# Patient Record
Sex: FEMALE | Race: WHITE | NOT HISPANIC OR LATINO | Employment: OTHER | URBAN - METROPOLITAN AREA
[De-identification: names, ages, dates, MRNs, and addresses within clinical notes are randomized per-mention and may not be internally consistent; named-entity substitution may affect disease eponyms.]

---

## 2017-11-09 ENCOUNTER — OFFICE VISIT (OUTPATIENT)
Dept: INTERNAL MEDICINE | Facility: CLINIC | Age: 62
End: 2017-11-09

## 2017-11-09 VITALS
DIASTOLIC BLOOD PRESSURE: 70 MMHG | HEIGHT: 62 IN | OXYGEN SATURATION: 99 % | TEMPERATURE: 97.9 F | SYSTOLIC BLOOD PRESSURE: 112 MMHG | HEART RATE: 99 BPM | WEIGHT: 246.6 LBS | BODY MASS INDEX: 45.38 KG/M2

## 2017-11-09 DIAGNOSIS — Z23 NEED FOR TDAP VACCINATION: ICD-10-CM

## 2017-11-09 DIAGNOSIS — Z13.29 SCREENING FOR THYROID DISORDER: ICD-10-CM

## 2017-11-09 DIAGNOSIS — R63.4 ABNORMAL WEIGHT LOSS: ICD-10-CM

## 2017-11-09 DIAGNOSIS — F32.89 ATYPICAL DEPRESSION: ICD-10-CM

## 2017-11-09 DIAGNOSIS — Z12.11 ENCOUNTER FOR SCREENING COLONOSCOPY: ICD-10-CM

## 2017-11-09 DIAGNOSIS — R63.0 DECREASED APPETITE: ICD-10-CM

## 2017-11-09 DIAGNOSIS — Z00.00 WELL WOMAN EXAM (NO GYNECOLOGICAL EXAM): Primary | ICD-10-CM

## 2017-11-09 DIAGNOSIS — Z12.39 SCREENING FOR BREAST CANCER: ICD-10-CM

## 2017-11-09 DIAGNOSIS — Z13.1 SCREENING FOR DIABETES MELLITUS (DM): ICD-10-CM

## 2017-11-09 DIAGNOSIS — Z13.220 SCREENING FOR LIPID DISORDERS: ICD-10-CM

## 2017-11-09 PROBLEM — K21.9 ACID REFLUX: Status: ACTIVE | Noted: 2017-11-09

## 2017-11-09 PROBLEM — F30.9 BIPOLAR I DISORDER, SINGLE MANIC EPISODE: Status: ACTIVE | Noted: 2017-11-09

## 2017-11-09 PROBLEM — M54.50 LBP (LOW BACK PAIN): Status: ACTIVE | Noted: 2017-11-09

## 2017-11-09 PROBLEM — E78.5 HLD (HYPERLIPIDEMIA): Status: ACTIVE | Noted: 2017-11-09

## 2017-11-09 PROCEDURE — 99386 PREV VISIT NEW AGE 40-64: CPT | Performed by: FAMILY MEDICINE

## 2017-11-09 PROCEDURE — 99204 OFFICE O/P NEW MOD 45 MIN: CPT | Performed by: FAMILY MEDICINE

## 2017-11-09 PROCEDURE — 90471 IMMUNIZATION ADMIN: CPT | Performed by: FAMILY MEDICINE

## 2017-11-09 PROCEDURE — 90715 TDAP VACCINE 7 YRS/> IM: CPT | Performed by: FAMILY MEDICINE

## 2017-11-09 RX ORDER — FLUTICASONE PROPIONATE 50 MCG
2 SPRAY, SUSPENSION (ML) NASAL DAILY
Status: ON HOLD | COMMUNITY
End: 2017-12-09

## 2017-11-09 RX ORDER — MULTIVIT-MIN/IRON/FOLIC ACID/K 18-600-40
2000 CAPSULE ORAL DAILY
COMMUNITY
Start: 2013-11-11

## 2017-11-09 RX ORDER — TOPIRAMATE 100 MG/1
100 TABLET, FILM COATED ORAL 2 TIMES DAILY
COMMUNITY
Start: 2013-10-14 | End: 2018-02-13 | Stop reason: HOSPADM

## 2017-11-09 RX ORDER — NAPROXEN 500 MG/1
500 TABLET ORAL EVERY 12 HOURS PRN
COMMUNITY
Start: 2013-11-11 | End: 2017-12-20

## 2017-11-09 RX ORDER — PROPRANOLOL HYDROCHLORIDE 10 MG/1
10 TABLET ORAL DAILY PRN
COMMUNITY
End: 2017-12-12 | Stop reason: HOSPADM

## 2017-11-09 RX ORDER — ZOLPIDEM TARTRATE 10 MG/1
10 TABLET ORAL NIGHTLY PRN
Status: ON HOLD | COMMUNITY
Start: 2013-11-11 | End: 2017-12-09

## 2017-11-09 RX ORDER — ARMODAFINIL 250 MG/1
125 TABLET ORAL DAILY
COMMUNITY
End: 2018-01-09

## 2017-11-09 RX ORDER — IBUPROFEN 200 MG
200 TABLET ORAL AS NEEDED
Status: ON HOLD | COMMUNITY
End: 2017-12-09

## 2017-11-09 RX ORDER — AZELASTINE 1 MG/ML
2 SPRAY, METERED NASAL 2 TIMES DAILY
Status: ON HOLD | COMMUNITY
End: 2017-12-09

## 2017-11-09 NOTE — PROGRESS NOTES
Subjective   Bowen Concepcion is a 62 y.o. female.     Chief Complaint   Patient presents with   • New Patient Visit     Previous PCP: Dr. Gemma Vásquez   • Loss of Appetite     since September, patient has loss 26 lbs   • Trouble Sleeping     Patient states she has sleep apnea         History of Present Illness     Patient presents today with abnormal weight loss.  Patient notes that she initially was trying to lose weight.  She tried multiple different nutrition programs including [employer] and programs offered by the [facility].  She notes that even after she stopped the programs, she continued to lose weight.  She notes that she does not have an appetite.  She notes that her depression has been significant.  She notes her depression is been going on for many years, and has seen psychiatry.  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  She has a history of bipolar, and is unstable at times.  Patient however believes that her abnormal weight loss and decreased appetite is due to something organically being wrong with patient from the inside.  She does not believe that these could be related to her depression.  Patient states she lost 26 pounds in one month.    The following portions of the patient's history were reviewed and updated as appropriate: allergies, current medications, past family history, past medical history, past social history, past surgical history and problem list.    Review of Systems   Constitutional: Positive for fatigue and unexpected weight change. Negative for chills and fever.   HENT: Negative for congestion, rhinorrhea, sinus pain and sore throat.    Eyes: Negative for photophobia and visual disturbance.   Respiratory: Negative for cough, chest tightness and shortness of breath.    Cardiovascular: Negative for chest pain and palpitations.   Gastrointestinal: Negative for diarrhea, nausea and vomiting.   Genitourinary: Negative for  dysuria, frequency and urgency.   Skin: Negative for rash and wound.   Neurological: Negative for dizziness and syncope.   Psychiatric/Behavioral: Positive for decreased concentration and sleep disturbance. Negative for behavioral problems and confusion.       Objective   Physical Exam   Constitutional: She is oriented to person, place, and time. She appears well-developed and well-nourished.   HENT:   Head: Normocephalic and atraumatic.   Right Ear: External ear normal.   Left Ear: External ear normal.   Mouth/Throat: Oropharynx is clear and moist.   Eyes: EOM are normal.   Neck: Normal range of motion. Neck supple.   Cardiovascular: Normal rate, regular rhythm and normal heart sounds.    Pulmonary/Chest: Effort normal and breath sounds normal. No respiratory distress.   Musculoskeletal: Normal range of motion.   Lymphadenopathy:     She has no cervical adenopathy.   Neurological: She is alert and oriented to person, place, and time.   Skin: Skin is warm.   Psychiatric: She has a normal mood and affect. Her behavior is normal.   Nursing note and vitals reviewed.      Assessment/Plan   Bowen was seen today for new patient visit, loss of appetite and trouble sleeping.    Diagnoses and all orders for this visit:      Decreased appetite  -     Ambulatory Referral to Nutrition Services  -     I discussed with patient that her decreased appetite could most likely be due to her atypical depression.  I strongly suggested the patient to have a nutrition referral.  Patient understands and agrees with plan.    Abnormal weight loss  -     CBC & Differential  -     Comprehensive Metabolic Panel  -     Thyroid Panel With TSH  -     Ambulatory Referral to Nutrition Services    Atypical depression        -    Continue topiramate.  I discussed patient that her abnormal weight loss all related to her depression.  I strongly urged patient to contact her psychiatrist for further evaluation if labs come back  normal.              Return in about 4 weeks (around 12/7/2017).    Much of this encounter note is an electronic transcription/translation of spoken language to printed text. The electronic translation of spoken language may permit erroneous, or at times, nonsensical words or phrases to be inadvertently transcribed; although I have reviewed the note for such errors, some may still exist.

## 2017-11-09 NOTE — PROGRESS NOTES
Subjective   Bowen DOTSON Carlene is a 62 y.o. female and is here for a comprehensive physical exam. The patient reports problems - abnormal weight loss. Depression. .    Pt is UTD with annual gyn exam and mammo     Do you take any herbs or supplements that were not prescribed by a doctor? Yes  Calcium and vitamin D, iron, multivitamin, Prilosec.        Soci.al History:   Social History     Social History   • Marital status:      Spouse name: N/A   • Number of children: N/A   • Years of education: N/A     Occupational History   • Not on file.     Social History Main Topics   • Smoking status: Former Smoker     Packs/day: 2.00     Years: 26.00   • Smokeless tobacco: Never Used   • Alcohol use No   • Drug use: No   • Sexual activity: No     Other Topics Concern   • Not on file     Social History Narrative   • No narrative on file       Family History:   Family History   Problem Relation Age of Onset   • Heart disease Mother    • Hypertension Mother    • Depression Mother    • Cancer Father    • Depression Sister    • Depression Brother    • Depression Brother    • Depression Brother    • Depression Sister    • Vision loss Maternal Grandmother    • Vision loss Paternal Grandmother        Past Medical History:   Past Medical History:   Diagnosis Date   • Bipolar disorder    • Depression    • Sleep apnea            Review of Systems    Review of Systems   Constitutional: Positive for fatigue and unexpected weight change. Negative for chills and fever.   HENT: Negative for congestion, rhinorrhea, sinus pain and sore throat.    Eyes: Negative for photophobia and visual disturbance.   Respiratory: Negative for cough, chest tightness and shortness of breath.    Cardiovascular: Negative for chest pain and palpitations.   Gastrointestinal: Negative for diarrhea, nausea and vomiting.   Genitourinary: Negative for dysuria, frequency and urgency.   Skin: Negative for rash and wound.   Neurological: Negative for dizziness and  syncope.   Psychiatric/Behavioral: Positive for decreased concentration and sleep disturbance. Negative for behavioral problems and confusion.       Objective   Physical Exam   Constitutional: She is oriented to person, place, and time. She appears well-developed and well-nourished.   HENT:   Head: Normocephalic and atraumatic.   Right Ear: External ear normal.   Left Ear: External ear normal.   Mouth/Throat: Oropharynx is clear and moist.   Eyes: EOM are normal.   Neck: Normal range of motion. Neck supple.   Cardiovascular: Normal rate, regular rhythm and normal heart sounds.    Pulmonary/Chest: Effort normal and breath sounds normal. No respiratory distress.   Musculoskeletal: Normal range of motion.   Lymphadenopathy:     She has no cervical adenopathy.   Neurological: She is alert and oriented to person, place, and time.   Skin: Skin is warm.   Psychiatric: She has a normal mood and affect. Her behavior is normal.   Nursing note and vitals reviewed.      Medications:   Current Outpatient Prescriptions:   •  Armodafinil 250 MG tablet, Take 250 mg by mouth Daily., Disp: , Rfl:   •  aspirin (ROBINSON ASPIRIN EXTRA STRENGTH) 500 MG tablet, Take 500 mg by mouth As Needed for Mild Pain ., Disp: , Rfl:   •  azelastine (ASTELIN) 0.1 % nasal spray, 2 sprays into each nostril 2 (Two) Times a Day. Use in each nostril as directed, Disp: , Rfl:   •  Cholecalciferol (VITAMIN D) 2000 units capsule, Take 2,000 Units by mouth Daily., Disp: , Rfl:   •  fluticasone (FLONASE) 50 MCG/ACT nasal spray, 2 sprays into each nostril Daily., Disp: , Rfl:   •  ibuprofen (ADVIL,MOTRIN) 200 MG tablet, Take 200 mg by mouth As Needed for Mild Pain ., Disp: , Rfl:   •  Multiple Vitamins-Minerals (PRESERVISION AREDS 2 PO), Take 1 tablet by mouth Daily., Disp: , Rfl:   •  naproxen (NAPROSYN) 500 MG tablet, Take 500 mg by mouth Every 12 (Twelve) Hours As Needed., Disp: , Rfl:   •  propranolol (INDERAL) 10 MG tablet, Take 10 mg by mouth Daily As  Needed., Disp: , Rfl:   •  topiramate (TOPAMAX) 100 MG tablet, Take 100 mg by mouth 2 (Two) Times a Day., Disp: , Rfl:   •  zolpidem (AMBIEN) 10 MG tablet, Take 10 mg by mouth At Night As Needed., Disp: , Rfl:        Assessment/Plan   Healthy female exam.     1. Healthcare Maintenance:  2. Patient Counseling:  --Nutrition: Stressed importance of moderation in sodium/caffeine intake, saturated fat and cholesterol, caloric balance, sufficient intake of fresh fruits, vegetables, fiber, calcium and vit D. she does complain of abnormal weight loss.  She states that she has no appetite.  She is trying multiple different weight loss management programs.  I discussed the patient that she should seek counseling with nutrition.    --Exercise: patient states that she will try to restart her aqua-aerobics.  --Immunizations reviewed. We'll get a TdaP today.    --Discussed benefits of screening colonoscopy.    Diagnoses and all orders for this visit:    Well woman exam (no gynecological exam)  -     CBC & Differential  -     Comprehensive Metabolic Panel  -     Ambulatory Referral to Gynecology    Encounter for screening colonoscopy  -     Ambulatory Referral For Screening Colonoscopy    Screening for breast cancer  -     Mammo Screening Bilateral With CAD; Future    Screening for diabetes mellitus (DM)  -     Hemoglobin A1c    Screening for lipid disorders  -     Lipid Panel With LDL / HDL Ratio    Need for Tdap vaccination  -     Tdap Vaccine Greater Than or Equal To 6yo IM    Screening for thyroid disorder  -     Thyroid Panel With TSH    Other orders  -     topiramate (TOPAMAX) 100 MG tablet; Take 100 mg by mouth 2 (Two) Times a Day.  -     naproxen (NAPROSYN) 500 MG tablet; Take 500 mg by mouth Every 12 (Twelve) Hours As Needed.  -     zolpidem (AMBIEN) 10 MG tablet; Take 10 mg by mouth At Night As Needed.  -     Cholecalciferol (VITAMIN D) 2000 units capsule; Take 2,000 Units by mouth Daily.  -     Armodafinil 250 MG  tablet; Take 250 mg by mouth Daily.  -     propranolol (INDERAL) 10 MG tablet; Take 10 mg by mouth Daily As Needed.  -     azelastine (ASTELIN) 0.1 % nasal spray; 2 sprays into each nostril 2 (Two) Times a Day. Use in each nostril as directed  -     ibuprofen (ADVIL,MOTRIN) 200 MG tablet; Take 200 mg by mouth As Needed for Mild Pain .  -     aspirin (ROBINSON ASPIRIN EXTRA STRENGTH) 500 MG tablet; Take 500 mg by mouth As Needed for Mild Pain .  -     fluticasone (FLONASE) 50 MCG/ACT nasal spray; 2 sprays into each nostril Daily.  -     Multiple Vitamins-Minerals (PRESERVISION AREDS 2 PO); Take 1 tablet by mouth Daily.        Return in about 4 weeks (around 12/7/2017).

## 2017-11-10 ENCOUNTER — TELEPHONE (OUTPATIENT)
Dept: INTERNAL MEDICINE | Facility: CLINIC | Age: 62
End: 2017-11-10

## 2017-11-10 DIAGNOSIS — E87.0 SERUM SODIUM ELEVATED: Primary | ICD-10-CM

## 2017-11-10 LAB
ALBUMIN SERPL-MCNC: 4.3 G/DL (ref 3.5–5.2)
ALBUMIN/GLOB SERPL: 1.7 G/DL
ALP SERPL-CCNC: 94 U/L (ref 39–117)
ALT SERPL-CCNC: 19 U/L (ref 1–33)
AST SERPL-CCNC: 21 U/L (ref 1–32)
BASOPHILS # BLD AUTO: 0.04 10*3/MM3 (ref 0–0.2)
BASOPHILS NFR BLD AUTO: 0.4 % (ref 0–1.5)
BILIRUB SERPL-MCNC: 0.3 MG/DL (ref 0.1–1.2)
BUN SERPL-MCNC: 12 MG/DL (ref 8–23)
BUN/CREAT SERPL: 17.1 (ref 7–25)
CALCIUM SERPL-MCNC: 9.8 MG/DL (ref 8.6–10.5)
CHLORIDE SERPL-SCNC: 106 MMOL/L (ref 98–107)
CHOLEST SERPL-MCNC: 158 MG/DL (ref 0–200)
CO2 SERPL-SCNC: 24.4 MMOL/L (ref 22–29)
CREAT SERPL-MCNC: 0.7 MG/DL (ref 0.57–1)
EOSINOPHIL # BLD AUTO: 0.11 10*3/MM3 (ref 0–0.7)
EOSINOPHIL NFR BLD AUTO: 1.2 % (ref 0.3–6.2)
ERYTHROCYTE [DISTWIDTH] IN BLOOD BY AUTOMATED COUNT: 16.9 % (ref 11.7–13)
FT4I SERPL CALC-MCNC: 2.5 (ref 1.2–4.9)
GFR SERPLBLD CREATININE-BSD FMLA CKD-EPI: 102 ML/MIN/1.73
GFR SERPLBLD CREATININE-BSD FMLA CKD-EPI: 85 ML/MIN/1.73
GLOBULIN SER CALC-MCNC: 2.5 GM/DL
GLUCOSE SERPL-MCNC: 114 MG/DL (ref 65–99)
HBA1C MFR BLD: 5.7 % (ref 4.8–5.6)
HCT VFR BLD AUTO: 43.9 % (ref 35.6–45.5)
HDLC SERPL-MCNC: 54 MG/DL (ref 40–60)
HGB BLD-MCNC: 14 G/DL (ref 11.9–15.5)
IMM GRANULOCYTES # BLD: 0 10*3/MM3 (ref 0–0.03)
IMM GRANULOCYTES NFR BLD: 0 % (ref 0–0.5)
LDLC SERPL CALC-MCNC: 91 MG/DL (ref 0–100)
LDLC/HDLC SERPL: 1.68 {RATIO}
LYMPHOCYTES # BLD AUTO: 2.11 10*3/MM3 (ref 0.9–4.8)
LYMPHOCYTES NFR BLD AUTO: 22.1 % (ref 19.6–45.3)
MCH RBC QN AUTO: 26.3 PG (ref 26.9–32)
MCHC RBC AUTO-ENTMCNC: 31.9 G/DL (ref 32.4–36.3)
MCV RBC AUTO: 82.5 FL (ref 80.5–98.2)
MONOCYTES # BLD AUTO: 0.76 10*3/MM3 (ref 0.2–1.2)
MONOCYTES NFR BLD AUTO: 7.9 % (ref 5–12)
NEUTROPHILS # BLD AUTO: 6.54 10*3/MM3 (ref 1.9–8.1)
NEUTROPHILS NFR BLD AUTO: 68.4 % (ref 42.7–76)
PLATELET # BLD AUTO: 253 10*3/MM3 (ref 140–500)
POTASSIUM SERPL-SCNC: 4 MMOL/L (ref 3.5–5.2)
PROT SERPL-MCNC: 6.8 G/DL (ref 6–8.5)
RBC # BLD AUTO: 5.32 10*6/MM3 (ref 3.9–5.2)
SODIUM SERPL-SCNC: 147 MMOL/L (ref 136–145)
T3RU NFR SERPL: 31 % (ref 24–39)
T4 SERPL-MCNC: 8 UG/DL (ref 4.5–12)
TRIGL SERPL-MCNC: 66 MG/DL (ref 0–150)
TSH SERPL DL<=0.005 MIU/L-ACNC: 0.52 UIU/ML (ref 0.45–4.5)
VLDLC SERPL CALC-MCNC: 13.2 MG/DL (ref 5–40)
WBC # BLD AUTO: 9.56 10*3/MM3 (ref 4.5–10.7)

## 2017-11-10 NOTE — PROGRESS NOTES
Please inform the patient of the following abnormal results.  Thyroid is normal.  Lipid panel is normal.  CBC is within normal limits.  CMP does show some hypernatremia.  We will continue to monitor this.  Patient is to obtain a BMP in 1 week.  hbA1c is normal.

## 2017-11-10 NOTE — TELEPHONE ENCOUNTER
LVM- patient notified. Patient advised to contact office if they have any questions. Patient advised to contact office to schedule lab appointment for one week (around 11/16.) Lab orders put into EPIC.

## 2017-11-10 NOTE — TELEPHONE ENCOUNTER
----- Message from Aubrey Pitts MD sent at 11/10/2017  8:26 AM EST -----  Please inform the patient of the following abnormal results.  Thyroid is normal.  Lipid panel is normal.  CBC is within normal limits.  CMP does show some hypernatremia.  We will continue to monitor this.  Patient is to obtain a BMP in 1 week.  hbA1c is normal.

## 2017-11-16 ENCOUNTER — RESULTS ENCOUNTER (OUTPATIENT)
Dept: INTERNAL MEDICINE | Facility: CLINIC | Age: 62
End: 2017-11-16

## 2017-11-16 DIAGNOSIS — E87.0 SERUM SODIUM ELEVATED: ICD-10-CM

## 2017-11-16 LAB
BUN SERPL-MCNC: 19 MG/DL (ref 8–23)
BUN/CREAT SERPL: 25.7 (ref 7–25)
CALCIUM SERPL-MCNC: 9.6 MG/DL (ref 8.6–10.5)
CHLORIDE SERPL-SCNC: 107 MMOL/L (ref 98–107)
CO2 SERPL-SCNC: 24.6 MMOL/L (ref 22–29)
CREAT SERPL-MCNC: 0.74 MG/DL (ref 0.57–1)
GFR SERPLBLD CREATININE-BSD FMLA CKD-EPI: 79 ML/MIN/1.73
GFR SERPLBLD CREATININE-BSD FMLA CKD-EPI: 96 ML/MIN/1.73
GLUCOSE SERPL-MCNC: 102 MG/DL (ref 65–99)
POTASSIUM SERPL-SCNC: 4.4 MMOL/L (ref 3.5–5.2)
SODIUM SERPL-SCNC: 146 MMOL/L (ref 136–145)

## 2017-11-17 ENCOUNTER — TELEPHONE (OUTPATIENT)
Dept: INTERNAL MEDICINE | Facility: CLINIC | Age: 62
End: 2017-11-17

## 2017-11-17 DIAGNOSIS — E87.0 SERUM SODIUM ELEVATED: Primary | ICD-10-CM

## 2017-11-17 NOTE — TELEPHONE ENCOUNTER
----- Message from Aubrey Pitts MD sent at 11/17/2017  8:26 AM EST -----  Please inform the patient of the following abnormal results. Borderline elevated. Will continue to monitor. Will repeat BMP in one month.

## 2017-11-17 NOTE — TELEPHONE ENCOUNTER
Patient notified and voiced understanding. Patient advised to contact office if they have any questions. Lab appointment for one month scheduled and lab order put into EPIC per Dr. Pitts.

## 2017-11-17 NOTE — PROGRESS NOTES
Please inform the patient of the following abnormal results. Borderline elevated. Will continue to monitor. Will repeat BMP in one month.

## 2017-11-22 ENCOUNTER — HOSPITAL ENCOUNTER (OUTPATIENT)
Dept: MAMMOGRAPHY | Facility: HOSPITAL | Age: 62
Discharge: HOME | End: 2017-11-22
Admitting: FAMILY MEDICINE

## 2017-11-22 DIAGNOSIS — Z12.39 SCREENING FOR BREAST CANCER: ICD-10-CM

## 2017-11-22 PROCEDURE — G0202 SCR MAMMO BI INCL CAD: HCPCS

## 2017-11-27 ENCOUNTER — TELEPHONE (OUTPATIENT)
Dept: INTERNAL MEDICINE | Facility: CLINIC | Age: 62
End: 2017-11-27

## 2017-11-27 NOTE — TELEPHONE ENCOUNTER
----- Message from Aubrey Pitts MD sent at 11/27/2017  8:43 AM EST -----  The labs were reviewed. Please inform patient that labs were normal.

## 2017-12-09 ENCOUNTER — HOSPITAL ENCOUNTER (INPATIENT)
Facility: HOSPITAL | Age: 62
LOS: 3 days | Discharge: HOME | End: 2017-12-12
Attending: EMERGENCY MEDICINE | Admitting: INTERNAL MEDICINE

## 2017-12-09 ENCOUNTER — APPOINTMENT (OUTPATIENT)
Dept: CT IMAGING | Facility: HOSPITAL | Age: 62
End: 2017-12-09

## 2017-12-09 DIAGNOSIS — I48.91 ATRIAL FIBRILLATION WITH RAPID VENTRICULAR RESPONSE (HCC): Primary | ICD-10-CM

## 2017-12-09 PROBLEM — E87.6 HYPOKALEMIA: Status: ACTIVE | Noted: 2017-12-09

## 2017-12-09 PROBLEM — G47.33 OSA (OBSTRUCTIVE SLEEP APNEA): Status: ACTIVE | Noted: 2017-12-09

## 2017-12-09 PROBLEM — R63.4 WEIGHT LOSS, UNINTENTIONAL: Status: ACTIVE | Noted: 2017-12-09

## 2017-12-09 PROBLEM — R11.2 NAUSEA & VOMITING: Status: ACTIVE | Noted: 2017-12-09

## 2017-12-09 LAB
ALBUMIN SERPL-MCNC: 4.2 G/DL (ref 3.5–5.2)
ALBUMIN/GLOB SERPL: 1.2 G/DL
ALP SERPL-CCNC: 95 U/L (ref 39–117)
ALT SERPL W P-5'-P-CCNC: 22 U/L (ref 1–33)
ANION GAP SERPL CALCULATED.3IONS-SCNC: 18 MMOL/L
AST SERPL-CCNC: 24 U/L (ref 1–32)
BACTERIA UR QL AUTO: ABNORMAL /HPF
BASOPHILS # BLD AUTO: 0.02 10*3/MM3 (ref 0–0.2)
BASOPHILS NFR BLD AUTO: 0.2 % (ref 0–1.5)
BILIRUB SERPL-MCNC: 0.5 MG/DL (ref 0.1–1.2)
BILIRUB UR QL STRIP: NEGATIVE
BUN BLD-MCNC: 14 MG/DL (ref 8–23)
BUN/CREAT SERPL: 20.3 (ref 7–25)
CALCIUM SPEC-SCNC: 9.5 MG/DL (ref 8.6–10.5)
CHLORIDE SERPL-SCNC: 101 MMOL/L (ref 98–107)
CLARITY UR: ABNORMAL
CO2 SERPL-SCNC: 22 MMOL/L (ref 22–29)
COLOR UR: YELLOW
CREAT BLD-MCNC: 0.69 MG/DL (ref 0.57–1)
DEPRECATED RDW RBC AUTO: 46.7 FL (ref 37–54)
EOSINOPHIL # BLD AUTO: 0.03 10*3/MM3 (ref 0–0.7)
EOSINOPHIL NFR BLD AUTO: 0.3 % (ref 0.3–6.2)
ERYTHROCYTE [DISTWIDTH] IN BLOOD BY AUTOMATED COUNT: 15.6 % (ref 11.7–13)
GFR SERPL CREATININE-BSD FRML MDRD: 86 ML/MIN/1.73
GLOBULIN UR ELPH-MCNC: 3.4 GM/DL
GLUCOSE BLD-MCNC: 131 MG/DL (ref 65–99)
GLUCOSE UR STRIP-MCNC: NEGATIVE MG/DL
HCT VFR BLD AUTO: 46.3 % (ref 35.6–45.5)
HGB BLD-MCNC: 15.4 G/DL (ref 11.9–15.5)
HGB UR QL STRIP.AUTO: NEGATIVE
HYALINE CASTS UR QL AUTO: ABNORMAL /LPF
IMM GRANULOCYTES # BLD: 0.02 10*3/MM3 (ref 0–0.03)
IMM GRANULOCYTES NFR BLD: 0.2 % (ref 0–0.5)
KETONES UR QL STRIP: ABNORMAL
LEUKOCYTE ESTERASE UR QL STRIP.AUTO: ABNORMAL
LIPASE SERPL-CCNC: 15 U/L (ref 13–60)
LYMPHOCYTES # BLD AUTO: 1.6 10*3/MM3 (ref 0.9–4.8)
LYMPHOCYTES NFR BLD AUTO: 17.1 % (ref 19.6–45.3)
MAGNESIUM SERPL-MCNC: 2 MG/DL (ref 1.6–2.4)
MCH RBC QN AUTO: 27.3 PG (ref 26.9–32)
MCHC RBC AUTO-ENTMCNC: 33.3 G/DL (ref 32.4–36.3)
MCV RBC AUTO: 82.1 FL (ref 80.5–98.2)
MONOCYTES # BLD AUTO: 0.65 10*3/MM3 (ref 0.2–1.2)
MONOCYTES NFR BLD AUTO: 6.9 % (ref 5–12)
NEUTROPHILS # BLD AUTO: 7.05 10*3/MM3 (ref 1.9–8.1)
NEUTROPHILS NFR BLD AUTO: 75.3 % (ref 42.7–76)
NITRITE UR QL STRIP: NEGATIVE
NT-PROBNP SERPL-MCNC: 1058 PG/ML (ref 5–900)
PH UR STRIP.AUTO: 6 [PH] (ref 5–8)
PLATELET # BLD AUTO: 264 10*3/MM3 (ref 140–500)
PMV BLD AUTO: 9.9 FL (ref 6–12)
POTASSIUM BLD-SCNC: 3.2 MMOL/L (ref 3.5–5.2)
PROT SERPL-MCNC: 7.6 G/DL (ref 6–8.5)
PROT UR QL STRIP: ABNORMAL
RBC # BLD AUTO: 5.64 10*6/MM3 (ref 3.9–5.2)
RBC # UR: ABNORMAL /HPF
REF LAB TEST METHOD: ABNORMAL
SODIUM BLD-SCNC: 141 MMOL/L (ref 136–145)
SP GR UR STRIP: 1.01 (ref 1–1.03)
SQUAMOUS #/AREA URNS HPF: ABNORMAL /HPF
T4 FREE SERPL-MCNC: 2.52 NG/DL (ref 0.93–1.7)
TROPONIN T SERPL-MCNC: <0.01 NG/ML (ref 0–0.03)
TSH SERPL DL<=0.05 MIU/L-ACNC: 0.46 MIU/ML (ref 0.27–4.2)
UROBILINOGEN UR QL STRIP: ABNORMAL
WBC NRBC COR # BLD: 9.37 10*3/MM3 (ref 4.5–10.7)
WBC UR QL AUTO: ABNORMAL /HPF

## 2017-12-09 PROCEDURE — 85025 COMPLETE CBC W/AUTO DIFF WBC: CPT | Performed by: NURSE PRACTITIONER

## 2017-12-09 PROCEDURE — 93010 ELECTROCARDIOGRAM REPORT: CPT | Performed by: INTERNAL MEDICINE

## 2017-12-09 PROCEDURE — 25010000002 CEFTRIAXONE PER 250 MG: Performed by: EMERGENCY MEDICINE

## 2017-12-09 PROCEDURE — 99284 EMERGENCY DEPT VISIT MOD MDM: CPT

## 2017-12-09 PROCEDURE — 25010000002 ONDANSETRON PER 1 MG: Performed by: INTERNAL MEDICINE

## 2017-12-09 PROCEDURE — 84439 ASSAY OF FREE THYROXINE: CPT | Performed by: EMERGENCY MEDICINE

## 2017-12-09 PROCEDURE — 81001 URINALYSIS AUTO W/SCOPE: CPT | Performed by: NURSE PRACTITIONER

## 2017-12-09 PROCEDURE — 84443 ASSAY THYROID STIM HORMONE: CPT | Performed by: EMERGENCY MEDICINE

## 2017-12-09 PROCEDURE — 80053 COMPREHEN METABOLIC PANEL: CPT | Performed by: NURSE PRACTITIONER

## 2017-12-09 PROCEDURE — 74177 CT ABD & PELVIS W/CONTRAST: CPT

## 2017-12-09 PROCEDURE — 83735 ASSAY OF MAGNESIUM: CPT | Performed by: EMERGENCY MEDICINE

## 2017-12-09 PROCEDURE — 83690 ASSAY OF LIPASE: CPT | Performed by: NURSE PRACTITIONER

## 2017-12-09 PROCEDURE — 25010000002 ENOXAPARIN PER 10 MG: Performed by: EMERGENCY MEDICINE

## 2017-12-09 PROCEDURE — 87086 URINE CULTURE/COLONY COUNT: CPT | Performed by: NURSE PRACTITIONER

## 2017-12-09 PROCEDURE — 84484 ASSAY OF TROPONIN QUANT: CPT | Performed by: EMERGENCY MEDICINE

## 2017-12-09 PROCEDURE — 93005 ELECTROCARDIOGRAM TRACING: CPT | Performed by: EMERGENCY MEDICINE

## 2017-12-09 PROCEDURE — 83880 ASSAY OF NATRIURETIC PEPTIDE: CPT | Performed by: EMERGENCY MEDICINE

## 2017-12-09 PROCEDURE — 25010000002 ONDANSETRON PER 1 MG: Performed by: NURSE PRACTITIONER

## 2017-12-09 RX ORDER — BISACODYL 5 MG/1
5 TABLET, DELAYED RELEASE ORAL DAILY PRN
Status: DISCONTINUED | OUTPATIENT
Start: 2017-12-09 | End: 2017-12-12 | Stop reason: HOSPADM

## 2017-12-09 RX ORDER — ALPRAZOLAM 0.25 MG/1
0.25 TABLET ORAL DAILY PRN
Status: ON HOLD | COMMUNITY
End: 2018-02-11

## 2017-12-09 RX ORDER — SENNA AND DOCUSATE SODIUM 50; 8.6 MG/1; MG/1
2 TABLET, FILM COATED ORAL 2 TIMES DAILY
Status: DISCONTINUED | OUTPATIENT
Start: 2017-12-09 | End: 2017-12-12 | Stop reason: HOSPADM

## 2017-12-09 RX ORDER — ZOLPIDEM TARTRATE 12.5 MG/1
12.5 TABLET, FILM COATED, EXTENDED RELEASE ORAL NIGHTLY PRN
COMMUNITY
End: 2021-07-19

## 2017-12-09 RX ORDER — ACETAMINOPHEN 325 MG/1
650 TABLET ORAL EVERY 4 HOURS PRN
Status: DISCONTINUED | OUTPATIENT
Start: 2017-12-09 | End: 2017-12-12 | Stop reason: HOSPADM

## 2017-12-09 RX ORDER — ONDANSETRON 4 MG/1
4 TABLET, FILM COATED ORAL EVERY 6 HOURS PRN
Status: DISCONTINUED | OUTPATIENT
Start: 2017-12-09 | End: 2017-12-12 | Stop reason: HOSPADM

## 2017-12-09 RX ORDER — DILTIAZEM HYDROCHLORIDE 5 MG/ML
10 INJECTION INTRAVENOUS ONCE
Status: COMPLETED | OUTPATIENT
Start: 2017-12-09 | End: 2017-12-09

## 2017-12-09 RX ORDER — ONDANSETRON 2 MG/ML
4 INJECTION INTRAMUSCULAR; INTRAVENOUS EVERY 6 HOURS PRN
Status: DISCONTINUED | OUTPATIENT
Start: 2017-12-09 | End: 2017-12-12 | Stop reason: HOSPADM

## 2017-12-09 RX ORDER — ONDANSETRON 4 MG/1
4 TABLET, ORALLY DISINTEGRATING ORAL EVERY 6 HOURS PRN
Status: DISCONTINUED | OUTPATIENT
Start: 2017-12-09 | End: 2017-12-12 | Stop reason: HOSPADM

## 2017-12-09 RX ORDER — NITROGLYCERIN 0.4 MG/1
0.4 TABLET SUBLINGUAL
Status: DISCONTINUED | OUTPATIENT
Start: 2017-12-09 | End: 2017-12-12 | Stop reason: HOSPADM

## 2017-12-09 RX ORDER — DIGOXIN 0.25 MG/ML
250 INJECTION INTRAMUSCULAR; INTRAVENOUS ONCE
Status: COMPLETED | OUTPATIENT
Start: 2017-12-09 | End: 2017-12-10

## 2017-12-09 RX ORDER — ONDANSETRON 2 MG/ML
4 INJECTION INTRAMUSCULAR; INTRAVENOUS ONCE
Status: COMPLETED | OUTPATIENT
Start: 2017-12-09 | End: 2017-12-09

## 2017-12-09 RX ORDER — ZOLPIDEM TARTRATE 5 MG/1
5 TABLET ORAL NIGHTLY PRN
Status: DISCONTINUED | OUTPATIENT
Start: 2017-12-09 | End: 2017-12-12 | Stop reason: HOSPADM

## 2017-12-09 RX ORDER — ALUMINA, MAGNESIA, AND SIMETHICONE 2400; 2400; 240 MG/30ML; MG/30ML; MG/30ML
15 SUSPENSION ORAL EVERY 6 HOURS PRN
Status: DISCONTINUED | OUTPATIENT
Start: 2017-12-09 | End: 2017-12-12 | Stop reason: HOSPADM

## 2017-12-09 RX ORDER — FAMOTIDINE 10 MG
10 TABLET ORAL 2 TIMES DAILY
COMMUNITY
End: 2017-12-20 | Stop reason: SDUPTHER

## 2017-12-09 RX ORDER — LORAZEPAM 1 MG/1
2 TABLET ORAL ONCE
Status: COMPLETED | OUTPATIENT
Start: 2017-12-09 | End: 2017-12-09

## 2017-12-09 RX ORDER — POTASSIUM CHLORIDE, DEXTROSE MONOHYDRATE AND SODIUM CHLORIDE 300; 5; 900 MG/100ML; G/100ML; MG/100ML
75 INJECTION, SOLUTION INTRAVENOUS CONTINUOUS
Status: DISCONTINUED | OUTPATIENT
Start: 2017-12-10 | End: 2017-12-11

## 2017-12-09 RX ORDER — FAMOTIDINE 10 MG
10 TABLET ORAL 2 TIMES DAILY
Status: DISCONTINUED | OUTPATIENT
Start: 2017-12-10 | End: 2017-12-12 | Stop reason: HOSPADM

## 2017-12-09 RX ORDER — ALPRAZOLAM 0.25 MG/1
0.25 TABLET ORAL DAILY PRN
Status: DISCONTINUED | OUTPATIENT
Start: 2017-12-09 | End: 2017-12-12 | Stop reason: HOSPADM

## 2017-12-09 RX ADMIN — ONDANSETRON 4 MG: 2 INJECTION INTRAMUSCULAR; INTRAVENOUS at 18:46

## 2017-12-09 RX ADMIN — CEFTRIAXONE 1000 MG: 1 INJECTION, POWDER, FOR SOLUTION INTRAMUSCULAR; INTRAVENOUS at 14:04

## 2017-12-09 RX ADMIN — LORAZEPAM 2 MG: 1 TABLET ORAL at 23:18

## 2017-12-09 RX ADMIN — DILTIAZEM HYDROCHLORIDE 10 MG: 5 INJECTION, SOLUTION INTRAVENOUS at 15:20

## 2017-12-09 RX ADMIN — ENOXAPARIN SODIUM 110 MG: 60 INJECTION SUBCUTANEOUS at 14:14

## 2017-12-09 RX ADMIN — DILTIAZEM HYDROCHLORIDE 15 MG/HR: 100 INJECTION, POWDER, LYOPHILIZED, FOR SOLUTION INTRAVENOUS at 22:44

## 2017-12-09 RX ADMIN — ONDANSETRON 4 MG: 2 INJECTION INTRAMUSCULAR; INTRAVENOUS at 11:27

## 2017-12-09 RX ADMIN — SODIUM CHLORIDE 1000 ML: 9 INJECTION, SOLUTION INTRAVENOUS at 11:27

## 2017-12-09 RX ADMIN — DILTIAZEM HYDROCHLORIDE 5 MG/HR: 5 INJECTION INTRAVENOUS at 14:14

## 2017-12-09 NOTE — PLAN OF CARE
Problem: Patient Care Overview (Adult)  Goal: Plan of Care Review  Outcome: Ongoing (interventions implemented as appropriate)    Problem: Arrhythmia/Dysrhythmia (Symptomatic) (Adult)  Goal: Signs and Symptoms of Listed Potential Problems Will be Absent or Manageable (Arrhythmia/Dysrhythmia)  Outcome: Ongoing (interventions implemented as appropriate)    Problem: Fall Risk (Adult)  Goal: Identify Related Risk Factors and Signs and Symptoms  Outcome: Ongoing (interventions implemented as appropriate)  Goal: Absence of Falls  Outcome: Ongoing (interventions implemented as appropriate)

## 2017-12-09 NOTE — ED PROVIDER NOTES
Pt reports to the ED complaining of nausea and vomiting. Pt reports chronic intermittent SOA. Pt denies palpitations and CP. Discussed with pt diagnosis of new onset atrial fibrillation. Discussed with pt plan of admission for further evaluation into her a fib.    The pt is in NAD. The pt is alert and oriented X 3. The pt's heart is tachycardic and irregularly irregular. The pt's lungs are clear to auscultation bilaterally. The pt has a flat affect.    EKG           EKG time: 1326  Rhythm/Rate: atrial fibrillation,rate of 140  QRS, axis: normal  ST and T waves: nonspecific ST wave changes    Interpreted Contemporaneously by me, independently viewed  A fib is new compared to previous on 7-16-08    1447  Received a call from Dr. Beauchamp and discussed pt's case. Dr. Beauchamp agreed with plan to admit the pt.      I supervised care provided by the midlevel provider.  We have discussed this patient's history, physical exam, and treatment plan.  I have reviewed the note and personally saw and examined the patient and agree with the plan of care.    Documentation assistance provided by qasim Ortiz for Dr. Vasquez. Information recorded by the qasim was done at my direction and has been verified and validated by me.       Alfonzo Ortiz  12/09/17 [id number]       Kj Vasquez MD  12/09/17 [id number]

## 2017-12-09 NOTE — ED PROVIDER NOTES
EMERGENCY DEPARTMENT ENCOUNTER    CHIEF COMPLAINT  Chief Complaint: Vomiting  History given by:Patient  History limited by:Nothing  Room Number: 22/22  PMD: Aubrey Pitts MD       HPI:  Pt is a 62 y.o. female who presents with vomiting (X4-5) that began last night.  Pt reports associated subjective fever and slight constipation. Also, for the past two months, Pt reports a loss of appetite, metallic taste, and weight loss..    Pt denies sharp stabbing abdominal pain, back pain, diarrhea. Pt has never had a surgery on abdomen or pelvis.  She denies recent travel.      Duration: One day  Timing:Constant  Intensity/Severity:Moderate  Progression:Unchanged  Associated Symptoms: Pt reports associated subjective fever and slight constipation. Also, for the past two months, Pt reports a loss of appetite, metallic taste, and weight loss.  Aggravating Factors:None  Alleviating Factors:None  Previous Episodes:None  Treatment before arrival:None    PAST MEDICAL HISTORY  Active Ambulatory Problems     Diagnosis Date Noted   • Bipolar I disorder, single manic episode 11/09/2017   • Acid reflux 11/09/2017   • HLD (hyperlipidemia) 11/09/2017   • LBP (low back pain) 11/09/2017     Resolved Ambulatory Problems     Diagnosis Date Noted   • No Resolved Ambulatory Problems     Past Medical History:   Diagnosis Date   • Bipolar disorder    • Depression    • Sleep apnea        PAST SURGICAL HISTORY  Past Surgical History:   Procedure Laterality Date   • COLONOSCOPY  01/01/2004       FAMILY HISTORY  Family History   Problem Relation Age of Onset   • Heart disease Mother    • Hypertension Mother    • Depression Mother    • Cancer Father    • Depression Sister    • Depression Brother    • Depression Brother    • Depression Brother    • Depression Sister    • Vision loss Maternal Grandmother    • Vision loss Paternal Grandmother    • Breast cancer Maternal Aunt    • Breast cancer Maternal Aunt        SOCIAL HISTORY  Social History      Social History   • Marital status: Single     Spouse name: N/A   • Number of children: N/A   • Years of education: N/A     Occupational History   • Not on file.     Social History Main Topics   • Smoking status: Former Smoker     Packs/day: 2.00     Years: 26.00   • Smokeless tobacco: Never Used   • Alcohol use No   • Drug use: No   • Sexual activity: No     Other Topics Concern   • Not on file     Social History Narrative         ALLERGIES  Review of patient's allergies indicates no known allergies.    REVIEW OF SYSTEMS  Review of Systems   Constitutional: Positive for appetite change ( decreased), fever (Subjective) and unexpected weight change. Negative for activity change, chills and fatigue.   HENT: Negative.  Negative for congestion, ear pain, rhinorrhea and sore throat.    Eyes: Negative.    Respiratory: Negative.  Negative for cough and shortness of breath.    Cardiovascular: Negative.  Negative for chest pain, palpitations and leg swelling ( pedal).   Gastrointestinal: Positive for constipation and nausea. Negative for abdominal pain, diarrhea and vomiting.   Endocrine: Negative.    Genitourinary: Negative.  Negative for decreased urine volume, difficulty urinating, dysuria, menstrual problem, pelvic pain, urgency and vaginal discharge.   Musculoskeletal: Negative.  Negative for back pain.   Skin: Negative.  Negative for rash.   Allergic/Immunologic: Negative.    Neurological: Negative.  Negative for dizziness, weakness, light-headedness, numbness and headaches.   Hematological: Negative.    Psychiatric/Behavioral: Negative.  The patient is not nervous/anxious.    All other systems reviewed and are negative.      PHYSICAL EXAM  ED Triage Vitals   Temp Heart Rate Resp BP SpO2   12/09/17 1018 12/09/17 1018 12/09/17 1018 -- 12/09/17 1018   98.2 °F (36.8 °C) 56 17  97 %      Temp src Heart Rate Source Patient Position BP Location FiO2 (%)   12/09/17 1018 -- -- -- --   Tympanic           Physical Exam    Constitutional: She is well-developed, well-nourished, and in no distress. No distress.   HENT:   Head: Normocephalic and atraumatic.   Mouth/Throat: Oropharynx is clear and moist. Mucous membranes are dry.   Eyes: Pupils are equal, round, and reactive to light.   Neck: Normal range of motion.   Cardiovascular: Regular rhythm and normal heart sounds.  Tachycardia present.    Pulmonary/Chest: Effort normal and breath sounds normal. She has no wheezes.   Abdominal: Soft. Bowel sounds are normal. There is no tenderness. There is no rigidity and no guarding.   Musculoskeletal: Normal range of motion. She exhibits no edema.   Neurological: She is alert.   Skin: Skin is warm and dry. No rash noted.   Psychiatric: Mood, memory, affect and judgment normal.   Nursing note and vitals reviewed.      LAB RESULTS  Recent Results (from the past 24 hour(s))   Comprehensive Metabolic Panel    Collection Time: 12/09/17 10:46 AM   Result Value Ref Range    Glucose 131 (H) 65 - 99 mg/dL    BUN 14 8 - 23 mg/dL    Creatinine 0.69 0.57 - 1.00 mg/dL    Sodium 141 136 - 145 mmol/L    Potassium 3.2 (L) 3.5 - 5.2 mmol/L    Chloride 101 98 - 107 mmol/L    CO2 22.0 22.0 - 29.0 mmol/L    Calcium 9.5 8.6 - 10.5 mg/dL    Total Protein 7.6 6.0 - 8.5 g/dL    Albumin 4.20 3.50 - 5.20 g/dL    ALT (SGPT) 22 1 - 33 U/L    AST (SGOT) 24 1 - 32 U/L    Alkaline Phosphatase 95 39 - 117 U/L    Total Bilirubin 0.5 0.1 - 1.2 mg/dL    eGFR Non African Amer 86 >60 mL/min/1.73    Globulin 3.4 gm/dL    A/G Ratio 1.2 g/dL    BUN/Creatinine Ratio 20.3 7.0 - 25.0    Anion Gap 18.0 mmol/L   Lipase    Collection Time: 12/09/17 10:46 AM   Result Value Ref Range    Lipase 15 13 - 60 U/L   CBC Auto Differential    Collection Time: 12/09/17 10:46 AM   Result Value Ref Range    WBC 9.37 4.50 - 10.70 10*3/mm3    RBC 5.64 (H) 3.90 - 5.20 10*6/mm3    Hemoglobin 15.4 11.9 - 15.5 g/dL    Hematocrit 46.3 (H) 35.6 - 45.5 %    MCV 82.1 80.5 - 98.2 fL    MCH 27.3 26.9 - 32.0 pg     MCHC 33.3 32.4 - 36.3 g/dL    RDW 15.6 (H) 11.7 - 13.0 %    RDW-SD 46.7 37.0 - 54.0 fl    MPV 9.9 6.0 - 12.0 fL    Platelets 264 140 - 500 10*3/mm3    Neutrophil % 75.3 42.7 - 76.0 %    Lymphocyte % 17.1 (L) 19.6 - 45.3 %    Monocyte % 6.9 5.0 - 12.0 %    Eosinophil % 0.3 0.3 - 6.2 %    Basophil % 0.2 0.0 - 1.5 %    Immature Grans % 0.2 0.0 - 0.5 %    Neutrophils, Absolute 7.05 1.90 - 8.10 10*3/mm3    Lymphocytes, Absolute 1.60 0.90 - 4.80 10*3/mm3    Monocytes, Absolute 0.65 0.20 - 1.20 10*3/mm3    Eosinophils, Absolute 0.03 0.00 - 0.70 10*3/mm3    Basophils, Absolute 0.02 0.00 - 0.20 10*3/mm3    Immature Grans, Absolute 0.02 0.00 - 0.03 10*3/mm3   Troponin    Collection Time: 12/09/17 10:46 AM   Result Value Ref Range    Troponin T <0.010 0.000 - 0.030 ng/mL   Magnesium    Collection Time: 12/09/17 10:46 AM   Result Value Ref Range    Magnesium 2.0 1.6 - 2.4 mg/dL   BNP    Collection Time: 12/09/17 10:46 AM   Result Value Ref Range    proBNP 1058.0 (H) 5.0 - 900.0 pg/mL   TSH    Collection Time: 12/09/17 10:46 AM   Result Value Ref Range    TSH 0.462 0.270 - 4.200 mIU/mL   T4, Free    Collection Time: 12/09/17 10:46 AM   Result Value Ref Range    Free T4 2.52 (H) 0.93 - 1.70 ng/dL   Urinalysis With / Culture If Indicated - Urine, Clean Catch    Collection Time: 12/09/17 12:11 PM   Result Value Ref Range    Color, UA Yellow Yellow, Straw    Appearance, UA Cloudy (A) Clear    pH, UA 6.0 5.0 - 8.0    Specific Gravity, UA 1.015 1.005 - 1.030    Glucose, UA Negative Negative    Ketones, UA 80 mg/dL (3+) (A) Negative    Bilirubin, UA Negative Negative    Blood, UA Negative Negative    Protein, UA Trace (A) Negative    Leuk Esterase, UA Large (3+) (A) Negative    Nitrite, UA Negative Negative    Urobilinogen, UA 1.0 E.U./dL 0.2 - 1.0 E.U./dL   Urinalysis, Microscopic Only - Urine, Clean Catch    Collection Time: 12/09/17 12:11 PM   Result Value Ref Range    RBC, UA 0-2 None Seen, 0-2 /HPF    WBC, UA 21-30 (A) None  "Seen, 0-2 /HPF    Bacteria, UA 1+ (A) None Seen /HPF    Squamous Epithelial Cells, UA 0-2 None Seen, 0-2 /HPF    Hyaline Casts, UA 7-12 None Seen /LPF    Methodology Manual Light Microscopy        I ordered the above labs and reviewed the results    RADIOLOGY  CT Abdomen Pelvis With Contrast    (Results Pending)       EKG    ekg was interpreted by Dr. Vasquez    PROGRESS AND CONSULTS    1010-pt refusing CT scan; continues to be tachy and rhythm looks irregular now.     1035  Reviewed pt's history and workup with Dr. Vasquez .  After a bedside evaluation; Dr Vasquez agrees with the plan of care    1045-no change in status    Dr. Vasquez spoke with Dr. Beauchamp to get patient admitted     COURSE & MEDICAL DECISION MAKING  Pertinent Labs and Imaging studies that were ordered and reviewed are noted above.  Results were reviewed/discussed with the patient and they were also made aware of online assess.   Pt also made aware that some labs, such as cultures, will not be resulted during ER visit and follow up with PMD is necessary.     MEDICATIONS GIVEN IN ER  Medications   diltiaZEM (CARDIZEM) IVPB 100 mg/100 mL (1 mg/mL) NS (add-vantage) (10 mg/hr Intravenous Rate/Dose Change 12/9/17 1430)   sodium chloride 0.9 % bolus 1,000 mL (0 mL Intravenous Stopped 12/9/17 1420)   ondansetron (ZOFRAN) injection 4 mg (4 mg Intravenous Given 12/9/17 1127)   cefTRIAXone (ROCEPHIN) in SWFI 1 gram/10ml IV PUSH syringe (1,000 mg Intravenous Given 12/9/17 1404)   diltiazem (CARDIZEM) bolus from bag 1 mg/mL 10 mg (10 mg Intravenous Bolus from Bag 12/9/17 1412)   enoxaparin (LOVENOX) syringe 110 mg (110 mg Subcutaneous Given 12/9/17 1414)       /58  Pulse (!) 165  Temp 98.2 °F (36.8 °C) (Tympanic)   Resp 20  Ht 157.5 cm (62\")  Wt 107 kg (235 lb)  SpO2 94%  BMI 42.98 kg/m2    ADMISSION    Discussed treatment plan and reason for admission with pt/family and admitting physician.  Pt/family voiced understanding of the plan for admission " for further testing/treatment as needed.      DIAGNOSIS  Final diagnoses:   None         Documentation assistance provided by qasim Francis for ARYAN Rowell.  Information recorded by the qasim was done at my direction and has been verified and validated by me.       Naya Mcfadden  12/09/17 1103       Naya Mcfadden  12/09/17 1329       ARYAN Bowie  12/09/17 2090

## 2017-12-10 ENCOUNTER — INPATIENT HOSPITAL (OUTPATIENT)
Dept: URBAN - METROPOLITAN AREA HOSPITAL 113 | Facility: HOSPITAL | Age: 62
End: 2017-12-10
Payer: MEDICARE

## 2017-12-10 ENCOUNTER — APPOINTMENT (OUTPATIENT)
Dept: ULTRASOUND IMAGING | Facility: HOSPITAL | Age: 62
End: 2017-12-10

## 2017-12-10 ENCOUNTER — APPOINTMENT (OUTPATIENT)
Dept: CARDIOLOGY | Facility: HOSPITAL | Age: 62
End: 2017-12-10
Attending: INTERNAL MEDICINE

## 2017-12-10 ENCOUNTER — APPOINTMENT (OUTPATIENT)
Dept: NUCLEAR MEDICINE | Facility: HOSPITAL | Age: 62
End: 2017-12-10

## 2017-12-10 DIAGNOSIS — R63.0 ANOREXIA: ICD-10-CM

## 2017-12-10 DIAGNOSIS — R63.4 ABNORMAL WEIGHT LOSS: ICD-10-CM

## 2017-12-10 DIAGNOSIS — R11.2 NAUSEA WITH VOMITING, UNSPECIFIED: ICD-10-CM

## 2017-12-10 LAB
ANION GAP SERPL CALCULATED.3IONS-SCNC: 12.8 MMOL/L
ASCENDING AORTA: 2.5 CM
BASOPHILS # BLD AUTO: 0.03 10*3/MM3 (ref 0–0.2)
BASOPHILS NFR BLD AUTO: 0.4 % (ref 0–1.5)
BH CV ECHO MEAS - ACS: 1.9 CM
BH CV ECHO MEAS - AO MAX PG (FULL): 4.5 MMHG
BH CV ECHO MEAS - AO MAX PG: 11.3 MMHG
BH CV ECHO MEAS - AO MEAN PG (FULL): 3 MMHG
BH CV ECHO MEAS - AO MEAN PG: 6 MMHG
BH CV ECHO MEAS - AO ROOT AREA (BSA CORRECTED): 1.3
BH CV ECHO MEAS - AO ROOT AREA: 5.3 CM^2
BH CV ECHO MEAS - AO ROOT DIAM: 2.6 CM
BH CV ECHO MEAS - AO V2 MAX: 168 CM/SEC
BH CV ECHO MEAS - AO V2 MEAN: 116 CM/SEC
BH CV ECHO MEAS - AO V2 VTI: 33 CM
BH CV ECHO MEAS - ASC AORTA: 2.5 CM
BH CV ECHO MEAS - AVA(I,A): 2 CM^2
BH CV ECHO MEAS - AVA(I,D): 2 CM^2
BH CV ECHO MEAS - AVA(V,A): 2.2 CM^2
BH CV ECHO MEAS - AVA(V,D): 2.2 CM^2
BH CV ECHO MEAS - BSA(HAYCOCK): 2.2 M^2
BH CV ECHO MEAS - BSA: 2 M^2
BH CV ECHO MEAS - BZI_BMI: 43 KILOGRAMS/M^2
BH CV ECHO MEAS - BZI_METRIC_HEIGHT: 157.5 CM
BH CV ECHO MEAS - BZI_METRIC_WEIGHT: 106.6 KG
BH CV ECHO MEAS - CONTRAST EF (2CH): 53.9 ML/M^2
BH CV ECHO MEAS - CONTRAST EF 4CH: 68.4 ML/M^2
BH CV ECHO MEAS - EDV(CUBED): 85.2 ML
BH CV ECHO MEAS - EDV(MOD-SP2): 102 ML
BH CV ECHO MEAS - EDV(MOD-SP4): 114 ML
BH CV ECHO MEAS - EDV(TEICH): 87.7 ML
BH CV ECHO MEAS - EF(CUBED): 57.8 %
BH CV ECHO MEAS - EF(MOD-SP2): 53.9 %
BH CV ECHO MEAS - EF(MOD-SP4): 68.4 %
BH CV ECHO MEAS - EF(TEICH): 49.7 %
BH CV ECHO MEAS - ESV(CUBED): 35.9 ML
BH CV ECHO MEAS - ESV(MOD-SP2): 47 ML
BH CV ECHO MEAS - ESV(MOD-SP4): 36 ML
BH CV ECHO MEAS - ESV(TEICH): 44.1 ML
BH CV ECHO MEAS - FS: 25 %
BH CV ECHO MEAS - IVS/LVPW: 1.1
BH CV ECHO MEAS - IVSD: 1 CM
BH CV ECHO MEAS - LAT PEAK E' VEL: 15 CM/SEC
BH CV ECHO MEAS - LV DIASTOLIC VOL/BSA (35-75): 55.7 ML/M^2
BH CV ECHO MEAS - LV MASS(C)D: 137.8 GRAMS
BH CV ECHO MEAS - LV MASS(C)DI: 67.3 GRAMS/M^2
BH CV ECHO MEAS - LV MAX PG: 6.8 MMHG
BH CV ECHO MEAS - LV MEAN PG: 3 MMHG
BH CV ECHO MEAS - LV SYSTOLIC VOL/BSA (12-30): 17.6 ML/M^2
BH CV ECHO MEAS - LV V1 MAX: 130 CM/SEC
BH CV ECHO MEAS - LV V1 MEAN: 81.1 CM/SEC
BH CV ECHO MEAS - LV V1 VTI: 23.4 CM
BH CV ECHO MEAS - LVIDD: 4.4 CM
BH CV ECHO MEAS - LVIDS: 3.3 CM
BH CV ECHO MEAS - LVLD AP2: 7.6 CM
BH CV ECHO MEAS - LVLD AP4: 8.1 CM
BH CV ECHO MEAS - LVLS AP2: 6.9 CM
BH CV ECHO MEAS - LVLS AP4: 6.8 CM
BH CV ECHO MEAS - LVOT AREA (M): 2.8 CM^2
BH CV ECHO MEAS - LVOT AREA: 2.8 CM^2
BH CV ECHO MEAS - LVOT DIAM: 1.9 CM
BH CV ECHO MEAS - LVPWD: 0.9 CM
BH CV ECHO MEAS - MED PEAK E' VEL: 9 CM/SEC
BH CV ECHO MEAS - MR MAX PG: 98 MMHG
BH CV ECHO MEAS - MR MAX VEL: 495 CM/SEC
BH CV ECHO MEAS - MV DEC SLOPE: 621 CM/SEC^2
BH CV ECHO MEAS - MV DEC TIME: 0.18 SEC
BH CV ECHO MEAS - MV E MAX VEL: 148 CM/SEC
BH CV ECHO MEAS - MV MAX PG: 8.9 MMHG
BH CV ECHO MEAS - MV MEAN PG: 2 MMHG
BH CV ECHO MEAS - MV P1/2T MAX VEL: 151 CM/SEC
BH CV ECHO MEAS - MV P1/2T: 71.2 MSEC
BH CV ECHO MEAS - MV V2 MAX: 149 CM/SEC
BH CV ECHO MEAS - MV V2 MEAN: 63 CM/SEC
BH CV ECHO MEAS - MV V2 VTI: 34.7 CM
BH CV ECHO MEAS - MVA P1/2T LCG: 1.5 CM^2
BH CV ECHO MEAS - MVA(P1/2T): 3.1 CM^2
BH CV ECHO MEAS - MVA(VTI): 1.9 CM^2
BH CV ECHO MEAS - PA ACC TIME: 0.12 SEC
BH CV ECHO MEAS - PA MAX PG (FULL): 2.3 MMHG
BH CV ECHO MEAS - PA MAX PG: 4 MMHG
BH CV ECHO MEAS - PA PR(ACCEL): 26.8 MMHG
BH CV ECHO MEAS - PA V2 MAX: 100 CM/SEC
BH CV ECHO MEAS - PVA(V,A): 1.7 CM^2
BH CV ECHO MEAS - PVA(V,D): 1.7 CM^2
BH CV ECHO MEAS - QP/QS: 0.55
BH CV ECHO MEAS - RAP SYSTOLE: 8 MMHG
BH CV ECHO MEAS - RV MAX PG: 1.7 MMHG
BH CV ECHO MEAS - RV MEAN PG: 1 MMHG
BH CV ECHO MEAS - RV V1 MAX: 65 CM/SEC
BH CV ECHO MEAS - RV V1 MEAN: 40.7 CM/SEC
BH CV ECHO MEAS - RV V1 VTI: 14.3 CM
BH CV ECHO MEAS - RVOT AREA: 2.5 CM^2
BH CV ECHO MEAS - RVOT DIAM: 1.8 CM
BH CV ECHO MEAS - RVSP: 38.9 MMHG
BH CV ECHO MEAS - SI(AO): 85.6 ML/M^2
BH CV ECHO MEAS - SI(CUBED): 24.1 ML/M^2
BH CV ECHO MEAS - SI(LVOT): 32.4 ML/M^2
BH CV ECHO MEAS - SI(MOD-SP2): 26.9 ML/M^2
BH CV ECHO MEAS - SI(MOD-SP4): 38.1 ML/M^2
BH CV ECHO MEAS - SI(TEICH): 21.3 ML/M^2
BH CV ECHO MEAS - SV(AO): 175.2 ML
BH CV ECHO MEAS - SV(CUBED): 49.2 ML
BH CV ECHO MEAS - SV(LVOT): 66.3 ML
BH CV ECHO MEAS - SV(MOD-SP2): 55 ML
BH CV ECHO MEAS - SV(MOD-SP4): 78 ML
BH CV ECHO MEAS - SV(RVOT): 36.4 ML
BH CV ECHO MEAS - SV(TEICH): 43.6 ML
BH CV ECHO MEAS - TAPSE (>1.6): 2.2 CM2
BH CV ECHO MEAS - TR MAX VEL: 278 CM/SEC
BH CV VAS BP RIGHT ARM: NORMAL MMHG
BH CV XLRA - RV BASE: 3.2 CM
BH CV XLRA - TDI S': 11 CM/SEC
BUN BLD-MCNC: 12 MG/DL (ref 8–23)
BUN/CREAT SERPL: 18.5 (ref 7–25)
CALCIUM SPEC-SCNC: 8.5 MG/DL (ref 8.6–10.5)
CHLORIDE SERPL-SCNC: 106 MMOL/L (ref 98–107)
CO2 SERPL-SCNC: 23.2 MMOL/L (ref 22–29)
CREAT BLD-MCNC: 0.65 MG/DL (ref 0.57–1)
DEPRECATED RDW RBC AUTO: 48.7 FL (ref 37–54)
E/E' RATIO: 13
EOSINOPHIL # BLD AUTO: 0.09 10*3/MM3 (ref 0–0.7)
EOSINOPHIL NFR BLD AUTO: 1.2 % (ref 0.3–6.2)
ERYTHROCYTE [DISTWIDTH] IN BLOOD BY AUTOMATED COUNT: 16 % (ref 11.7–13)
GFR SERPL CREATININE-BSD FRML MDRD: 92 ML/MIN/1.73
GLUCOSE BLD-MCNC: 112 MG/DL (ref 65–99)
HCT VFR BLD AUTO: 40.1 % (ref 35.6–45.5)
HGB BLD-MCNC: 12.7 G/DL (ref 11.9–15.5)
IMM GRANULOCYTES # BLD: 0 10*3/MM3 (ref 0–0.03)
IMM GRANULOCYTES NFR BLD: 0 % (ref 0–0.5)
LEFT ATRIUM VOLUME INDEX: 54 ML/M2
LV EF 2D ECHO EST: 68 %
LYMPHOCYTES # BLD AUTO: 2.84 10*3/MM3 (ref 0.9–4.8)
LYMPHOCYTES NFR BLD AUTO: 37.2 % (ref 19.6–45.3)
MAXIMAL PREDICTED HEART RATE: 157 BPM
MCH RBC QN AUTO: 26.6 PG (ref 26.9–32)
MCHC RBC AUTO-ENTMCNC: 31.7 G/DL (ref 32.4–36.3)
MCV RBC AUTO: 84.1 FL (ref 80.5–98.2)
MONOCYTES # BLD AUTO: 0.78 10*3/MM3 (ref 0.2–1.2)
MONOCYTES NFR BLD AUTO: 10.2 % (ref 5–12)
NEUTROPHILS # BLD AUTO: 3.9 10*3/MM3 (ref 1.9–8.1)
NEUTROPHILS NFR BLD AUTO: 51 % (ref 42.7–76)
PLATELET # BLD AUTO: 235 10*3/MM3 (ref 140–500)
PMV BLD AUTO: 9.8 FL (ref 6–12)
POTASSIUM BLD-SCNC: 3.8 MMOL/L (ref 3.5–5.2)
RBC # BLD AUTO: 4.77 10*6/MM3 (ref 3.9–5.2)
SODIUM BLD-SCNC: 142 MMOL/L (ref 136–145)
STRESS TARGET HR: 133 BPM
WBC NRBC COR # BLD: 7.64 10*3/MM3 (ref 4.5–10.7)

## 2017-12-10 PROCEDURE — 99221 1ST HOSP IP/OBS SF/LOW 40: CPT | Performed by: INTERNAL MEDICINE

## 2017-12-10 PROCEDURE — 25010000002 DIGOXIN PER 500 MCG: Performed by: INTERNAL MEDICINE

## 2017-12-10 PROCEDURE — 93306 TTE W/DOPPLER COMPLETE: CPT

## 2017-12-10 PROCEDURE — 76536 US EXAM OF HEAD AND NECK: CPT

## 2017-12-10 PROCEDURE — A9537 TC99M MEBROFENIN: HCPCS | Performed by: INTERNAL MEDICINE

## 2017-12-10 PROCEDURE — 76705 ECHO EXAM OF ABDOMEN: CPT

## 2017-12-10 PROCEDURE — 0 TECHNETIUM TC 99M MEBROFENIN KIT: Performed by: INTERNAL MEDICINE

## 2017-12-10 PROCEDURE — 80048 BASIC METABOLIC PNL TOTAL CA: CPT | Performed by: INTERNAL MEDICINE

## 2017-12-10 PROCEDURE — 25010000002 ENOXAPARIN PER 10 MG: Performed by: INTERNAL MEDICINE

## 2017-12-10 PROCEDURE — 99223 1ST HOSP IP/OBS HIGH 75: CPT | Performed by: INTERNAL MEDICINE

## 2017-12-10 PROCEDURE — 99223 1ST HOSP IP/OBS HIGH 75: CPT

## 2017-12-10 PROCEDURE — 93306 TTE W/DOPPLER COMPLETE: CPT | Performed by: INTERNAL MEDICINE

## 2017-12-10 PROCEDURE — 25010000002 SINCALIDE PER 5 MCG: Performed by: INTERNAL MEDICINE

## 2017-12-10 PROCEDURE — 78227 HEPATOBIL SYST IMAGE W/DRUG: CPT

## 2017-12-10 PROCEDURE — 0 IOPAMIDOL 61 % SOLUTION: Performed by: INTERNAL MEDICINE

## 2017-12-10 PROCEDURE — 85025 COMPLETE CBC W/AUTO DIFF WBC: CPT | Performed by: INTERNAL MEDICINE

## 2017-12-10 PROCEDURE — 25010000002 CEFTRIAXONE PER 250 MG: Performed by: HOSPITALIST

## 2017-12-10 RX ORDER — AMITRIPTYLINE HYDROCHLORIDE 25 MG/1
25 TABLET, FILM COATED ORAL NIGHTLY
Status: DISCONTINUED | OUTPATIENT
Start: 2017-12-10 | End: 2017-12-11

## 2017-12-10 RX ORDER — KIT FOR THE PREPARATION OF TECHNETIUM TC 99M MEBROFENIN 45 MG/10ML
1 INJECTION, POWDER, LYOPHILIZED, FOR SOLUTION INTRAVENOUS
Status: COMPLETED | OUTPATIENT
Start: 2017-12-10 | End: 2017-12-10

## 2017-12-10 RX ORDER — SENNA AND DOCUSATE SODIUM 50; 8.6 MG/1; MG/1
2 TABLET, FILM COATED ORAL NIGHTLY
Status: DISCONTINUED | OUTPATIENT
Start: 2017-12-10 | End: 2017-12-12 | Stop reason: HOSPADM

## 2017-12-10 RX ADMIN — METOPROLOL TARTRATE 25 MG: 25 TABLET ORAL at 13:17

## 2017-12-10 RX ADMIN — MEBROFENIN 1 DOSE: 45 INJECTION, POWDER, LYOPHILIZED, FOR SOLUTION INTRAVENOUS at 07:30

## 2017-12-10 RX ADMIN — DILTIAZEM HYDROCHLORIDE 5 MG/HR: 100 INJECTION, POWDER, LYOPHILIZED, FOR SOLUTION INTRAVENOUS at 06:58

## 2017-12-10 RX ADMIN — DOCUSATE SODIUM -SENNOSIDES 1 TABLET: 50; 8.6 TABLET, COATED ORAL at 10:51

## 2017-12-10 RX ADMIN — CEFTRIAXONE 1000 MG: 1 INJECTION, POWDER, FOR SOLUTION INTRAMUSCULAR; INTRAVENOUS at 15:48

## 2017-12-10 RX ADMIN — FAMOTIDINE 10 MG: 10 TABLET, FILM COATED ORAL at 18:47

## 2017-12-10 RX ADMIN — DOCUSATE SODIUM -SENNOSIDES 2 TABLET: 50; 8.6 TABLET, COATED ORAL at 18:47

## 2017-12-10 RX ADMIN — METOPROLOL TARTRATE 25 MG: 25 TABLET ORAL at 20:19

## 2017-12-10 RX ADMIN — ZOLPIDEM TARTRATE 5 MG: 5 TABLET ORAL at 23:11

## 2017-12-10 RX ADMIN — ENOXAPARIN SODIUM 110 MG: 120 INJECTION SUBCUTANEOUS at 18:48

## 2017-12-10 RX ADMIN — SINCALIDE 2.1 MCG: 5 INJECTION, POWDER, LYOPHILIZED, FOR SOLUTION INTRAVENOUS at 08:37

## 2017-12-10 RX ADMIN — FAMOTIDINE 10 MG: 10 TABLET, FILM COATED ORAL at 10:50

## 2017-12-10 RX ADMIN — DIGOXIN 250 MCG: 0.25 INJECTION INTRAMUSCULAR; INTRAVENOUS at 00:43

## 2017-12-10 RX ADMIN — IOPAMIDOL 85 ML: 612 INJECTION, SOLUTION INTRAVENOUS at 00:00

## 2017-12-10 RX ADMIN — ENOXAPARIN SODIUM 110 MG: 120 INJECTION SUBCUTANEOUS at 03:30

## 2017-12-10 RX ADMIN — POTASSIUM CHLORIDE, DEXTROSE MONOHYDRATE AND SODIUM CHLORIDE 75 ML/HR: 300; 5; 900 INJECTION, SOLUTION INTRAVENOUS at 13:07

## 2017-12-10 RX ADMIN — POTASSIUM CHLORIDE, DEXTROSE MONOHYDRATE AND SODIUM CHLORIDE 75 ML/HR: 300; 5; 900 INJECTION, SOLUTION INTRAVENOUS at 00:44

## 2017-12-10 NOTE — PROGRESS NOTES
"Pharmacy Consult - [facility]    Bowen Concepcion has been consulted for pharmacy to dose enoxaparin for Afib per Dr Beauchamp's request.         Relevant clinical data and objective history reviewed:  62 y.o. female 157.5 cm (62\") 105 kg (232 lb 6.4 oz)    Home Anticoagulation: none    Past Medical History:   Diagnosis Date   • Arthritis     OSTEOARTHRITIS RIGHT KNEE AND INDEX FINGERS   • Atrial fibrillation    • Bipolar disorder    • Depression    • Sleep apnea      has No Known Allergies.    Lab Results   Component Value Date    WBC 9.37 12/09/2017    HGB 15.4 12/09/2017    HCT 46.3 (H) 12/09/2017    MCV 82.1 12/09/2017     12/09/2017     Lab Results   Component Value Date    GLUCOSE 131 (H) 12/09/2017    CALCIUM 9.5 12/09/2017     12/09/2017    K 3.2 (L) 12/09/2017    CO2 22.0 12/09/2017     12/09/2017    BUN 14 12/09/2017    CREATININE 0.69 12/09/2017    EGFRIFAFRI 96 11/16/2017    EGFRIFNONA 86 12/09/2017    BCR 20.3 12/09/2017    ANIONGAP 18.0 12/09/2017       Estimated Creatinine Clearance: 95 mL/min (by C-G formula based on Cr of 0.69).    Active Inpatient Anticoagulation Orders: none    Assessment/Plan    Will start patient on 110 mg (1 mg/kg) subcutaneous every 12 hours, adjusted for renal function. Labs to be ordered by clinical pharmacist in morning. Pharmacy will continue to follow.     Ivan Allen [facility]    "

## 2017-12-10 NOTE — CONSULTS
Patient Name: Bowen Concepcion  :1955  62 y.o.    Date of Admission: 2017  Date of Consultation:  12/10/17  Encounter Provider: Victoriano Murdock III, MD  Place of Service: [facility] CARDIOLOGY  Referring Provider: Ana Beauchamp MD  Patient Care Team:  Aubrey Pitts MD as PCP - General (Emergency Medicine)  Bright Rodriguez MD as Consulting Physician (Psychiatry)      Chief complaint: Consulted for AFIB    History of Present Illness:   Ms. Concepcion is a 62-year-old female with no documented history of CAD, CHF, arrhythmias, familial CAD or thyroid disorder.  She does carry a history of hyperlipidemia, GERD, BARBIE (compliant with CPAP), palpitations and bipolar disorder.  She has been trying to lose weight and originally started with Nutrisystem for one month and had lost some but now has progressed to losing 40 pounds since September.  She has loss of appetite, nausea, peculiar taste in her mouth and on Friday started vomiting.  Due to the nausea and vomiting that she's been experiencing she has not been very compliant with her medications over the last few months and also has experienced increased episodes of palpitations.  She denies ever having a cardiac evaluation    Condition to the emergency room her blood pressure is 123/58, heart rate was 165 in atrial fibrillation, and she was started on a Cardizem drip.  Heart rate control is been inconsistent.  She denies any complaints of palpitations or heart racing.  No chest pain, pressure, tightness, squeezing, or heartburn.  She just had an echocardiogram performed that showed an EF of 68%, severely increased left atrial volume, moderate to severe dilated RA, mild mitral regurgitation and tricuspid regurgitation.  The gallbladder ultrasound showed sludge and stones in the gallbladder but no evidence of acute cholecystitis.  Follow-up HIDA scan showed a decreased gallbladder ejection fraction (1%) consistent with  biliary dyskinesia.    She feels weak but does not have any dizziness.  No presyncope or syncope.  She has not noticed any lower extremity edema.  No orthopnea and no PND.    This patient has no known cardiac history.  This patient has no history of coronary artery disease, congestive heart failure, rheumatic fever, rheumatic heart disease, congenital heart disease or heart murmur.  This patient has never required invasive cardiovascular evaluation.      Past Medical History:   Diagnosis Date   • Arthritis     OSTEOARTHRITIS RIGHT KNEE AND INDEX FINGERS   • Atrial fibrillation    • Bipolar disorder    • Depression    • Sleep apnea        Past Surgical History:   Procedure Laterality Date   • COLONOSCOPY  01/01/2004         Prior to Admission medications    Medication Sig Start Date End Date Taking? Authorizing Provider   ALPRAZolam (XANAX) 0.25 MG tablet Take 0.25 mg by mouth Daily As Needed for Anxiety.   Yes Historical Provider, MD   Armodafinil 250 MG tablet Take 125 mg by mouth Daily. BEEN TAKING 1/2 OF WHAT IS PRESCRIBED   Yes Historical Provider, MD   Cholecalciferol (VITAMIN D) 2000 units capsule Take 2,000 Units by mouth Daily. 11/11/13  Yes Historical Provider, MD   famotidine (PEPCID) 10 MG tablet Take 10 mg by mouth 2 (Two) Times a Day.   Yes Historical Provider, MD   naproxen (NAPROSYN) 500 MG tablet Take 500 mg by mouth Every 12 (Twelve) Hours As Needed. FOR KNEE PAIN 11/11/13  Yes Historical Provider, MD   propranolol (INDERAL) 10 MG tablet Take 10 mg by mouth Daily As Needed.   Yes Historical Provider, MD   topiramate (TOPAMAX) 100 MG tablet Take 100 mg by mouth 2 (Two) Times a Day. 10/14/13  Yes Historical Provider, MD   zolpidem CR (AMBIEN CR) 12.5 MG CR tablet Take 12.5 mg by mouth At Night As Needed for Sleep.   Yes Historical Provider, MD       No Known Allergies    Social History     Social History   • Marital status: Single     Spouse name: N/A   • Number of children: N/A   • Years of  education: N/A     Social History Main Topics   • Smoking status: Former Smoker     Packs/day: 2.00     Years: 26.00   • Smokeless tobacco: Never Used   • Alcohol use No   • Drug use: No   • Sexual activity: No     Other Topics Concern   • None     Social History Narrative       Family History   Problem Relation Age of Onset   • Heart disease Mother    • Hypertension Mother    • Depression Mother    • Cancer Father    • Depression Sister    • Depression Brother    • Depression Brother    • Depression Brother    • Depression Sister    • Vision loss Maternal Grandmother    • Vision loss Paternal Grandmother    • Breast cancer Maternal Aunt    • Breast cancer Maternal Aunt        REVIEW OF SYSTEMS:   All systems reviewed.  Pertinent positives identified in HPI.  All other systems are negative.      Objective:     Vitals:    12/09/17 2228 12/10/17 0043 12/10/17 0533 12/10/17 1056   BP: 164/98 113/83  135/84   BP Location: Left arm   Left arm   Patient Position: Lying   Sitting   Pulse: 112 104  87   Resp: 16   18   Temp:       TempSrc:       SpO2: 97%   96%   Weight:   107 kg (235 lb 4.8 oz)    Height:         Body mass index is 43.04 kg/(m^2).    Physical Exam:  General Appearance:    Alert, cooperative, in no acute distress   Head:    Normocephalic, without obvious abnormality, atraumatic   Eyes:            Lids and lashes normal, conjunctivae and sclerae normal, no   icterus, no pallor, corneas clear, PERRLA   Ears:    Ears appear intact with no abnormalities noted   Throat:   No oral lesions, no thrush, oral mucosa moist   Neck:   No adenopathy, supple, trachea midline, no thyromegaly, no   carotid bruit, no JVD   Back:     No kyphosis present, no scoliosis present, no skin lesions, erythema or scars, no tenderness to percussion or palpation, range of motion normal   Lungs:     Clear to auscultation,respirations regular, even and unlabored    Heart:    irregular rhythm and normal rate, normal S1 and S2, no murmur,  no gallop, no rub, no click   Chest Wall:    No abnormalities observed   Abdomen:     Normal bowel sounds, no masses, no organomegaly, soft        non-tender, non-distended, no guarding, no rebound  tenderness   Extremities:   Moves all extremities well, no edema, no cyanosis, no redness   Pulses:   Pulses palpable and equal bilaterally. Normal radial, carotid, femoral, dorsalis pedis and posterior tibial pulses bilaterally. Normal abdominal aorta   Skin:  Psychiatric:   No bleeding, bruising or rash    Alert and oriented x 3, normal mood and affect         Lab Review:       Results from last 7 days  Lab Units 12/10/17  0512 12/09/17  1046   SODIUM mmol/L 142 141   POTASSIUM mmol/L 3.8 3.2*   CHLORIDE mmol/L 106 101   CO2 mmol/L 23.2 22.0   BUN mg/dL 12 14   CREATININE mg/dL 0.65 0.69   CALCIUM mg/dL 8.5* 9.5   BILIRUBIN mg/dL  --  0.5   ALK PHOS U/L  --  95   ALT (SGPT) U/L  --  22   AST (SGOT) U/L  --  24   GLUCOSE mg/dL 112* 131*       Results from last 7 days  Lab Units 12/09/17  1046   TROPONIN T ng/mL <0.010       Results from last 7 days  Lab Units 12/10/17  0512   WBC 10*3/mm3 7.64   HEMOGLOBIN g/dL 12.7   HEMATOCRIT % 40.1   PLATELETS 10*3/mm3 235           Results from last 7 days  Lab Units 12/09/17  1046   MAGNESIUM mg/dL 2.0                     I personally viewed and interpreted the patient's EKG/Telemetry data.        Assessment and Plan:       Active Hospital Problems (** Indicates Principal Problem)    Diagnosis Date Noted   • Atrial fibrillation with rapid ventricular response [I48.91] 12/09/2017   • BARBIE (obstructive sleep apnea) [G47.33] 12/09/2017   • Nausea & vomiting [R11.2] 12/09/2017   • Hypokalemia [E87.6] 12/09/2017   • Abnormal weight loss [R63.4] 12/09/2017      Resolved Hospital Problems    Diagnosis Date Noted Date Resolved   No resolved problems to display.     1.  A. fib with RVR-we will start metoprolol to try to obtain better heart rate control.  Evaluation for weight loss is  currently underway.  Echocardiogram demonstrates normal ventricular function.  We're going to have a better handle on her significant weight loss before deciding on long-term anticoagulation.  Left atrium was significantly elevated, suggesting that she's been in atrial fibrillation for some time now, so I would not try to resume normal sinus rhythm at this point.  We will not initiate anti-rhythmic therapy therefore.  2.  Nausea and vomiting-workup underway  3.  Obstructive sleep apnea-  4.  Abnormal weight loss-workup underway      Victoriano Murdock III, MD  12/10/17  12:35 PM

## 2017-12-10 NOTE — CONSULTS
Inpatient Consult to Gastroenterology  Consult performed by: CORINA RODRÍGUEZ  Consult ordered by: SHERRY TOBIAS  Assessment/Recommendations: Nausea   Anorexia           Patient Care Team:  Aubrey Pitts MD as PCP - General (Emergency Medicine)  Bright Rodriguez MD as Consulting Physician (Psychiatry)    Chief complaint: loss appetite and nausea     Subjective   62 y.o. female who presented to the hospital with complaints of nausea and anorexia that started a couple of months ago.  She reports to have lost 40 pounds since September.  A few months ago she was on Nutrisystem and she noticed that she was having recurring problems with nausea and she started just losing her appetite. She has since stopped most of her home medication and Nurtisystem but has not noticed any improvement with her appetite. She denies any abdominal pain, diarrhea, chills or fever. She does report some heartburn at night if she eats too late. Yesterday she came to the ER due to having a couple episodes of emesis and not able to keep any liquid or food down.  At that time she was found to be in atrial fibrillation with a rapid ventricular response and was started on a diltiazem drip. She is on Topamax for depression but reports that she has been on this off and on over a number of years and she had not previously experienced this type of weight loss and anorexia.  Nausea   Associated symptoms include fatigue, nausea and vomiting. Pertinent negatives include no abdominal pain.   Vomiting    Pertinent negatives include no abdominal pain or diarrhea.       Review of Systems   Constitutional: Positive for appetite change, fatigue and unexpected weight change.   Gastrointestinal: Positive for nausea and vomiting. Negative for abdominal distention, abdominal pain, anal bleeding, blood in stool, constipation and diarrhea.        Past Medical History:   Diagnosis Date   • Arthritis     OSTEOARTHRITIS RIGHT KNEE AND INDEX FINGERS   • Atrial  fibrillation    • Bipolar disorder    • Depression    • Sleep apnea    ,   Past Surgical History:   Procedure Laterality Date   • COLONOSCOPY  01/01/2004   ,   Family History   Problem Relation Age of Onset   • Heart disease Mother    • Hypertension Mother    • Depression Mother    • Cancer Father    • Depression Sister    • Depression Brother    • Depression Brother    • Depression Brother    • Depression Sister    • Vision loss Maternal Grandmother    • Vision loss Paternal Grandmother    • Breast cancer Maternal Aunt    • Breast cancer Maternal Aunt    ,   Social History   Substance Use Topics   • Smoking status: Former Smoker     Packs/day: 2.00     Years: 26.00   • Smokeless tobacco: Never Used   • Alcohol use No   ,   Prescriptions Prior to Admission   Medication Sig Dispense Refill Last Dose   • ALPRAZolam (XANAX) 0.25 MG tablet Take 0.25 mg by mouth Daily As Needed for Anxiety.      • Armodafinil 250 MG tablet Take 125 mg by mouth Daily. BEEN TAKING 1/2 OF WHAT IS PRESCRIBED   Taking   • Cholecalciferol (VITAMIN D) 2000 units capsule Take 2,000 Units by mouth Daily.   Taking   • famotidine (PEPCID) 10 MG tablet Take 10 mg by mouth 2 (Two) Times a Day.      • naproxen (NAPROSYN) 500 MG tablet Take 500 mg by mouth Every 12 (Twelve) Hours As Needed. FOR KNEE PAIN   Taking   • propranolol (INDERAL) 10 MG tablet Take 10 mg by mouth Daily As Needed.   Taking   • topiramate (TOPAMAX) 100 MG tablet Take 100 mg by mouth 2 (Two) Times a Day.   Taking   • zolpidem CR (AMBIEN CR) 12.5 MG CR tablet Take 12.5 mg by mouth At Night As Needed for Sleep.      , Scheduled Meds:    ceftriaxone 1 g Intravenous Q24H   enoxaparin 1 mg/kg Subcutaneous Q12H   famotidine 10 mg Oral BID   metoprolol tartrate 25 mg Oral Q12H   polyethylene glycol 17 g Oral Daily   sennosides-docusate sodium 2 tablet Oral BID   sennosides-docusate sodium 2 tablet Oral Nightly   , Continuous Infusions:    dextrose 5% and sodium chloride 0.9% with KCl 40  mEq/L 75 mL/hr Last Rate: 75 mL/hr (12/10/17 1307)   diltiaZEM 5-15 mg/hr Last Rate: 5 mg/hr (12/10/17 0658)   diltiaZEM 5-15 mg/hr Last Rate: 15 mg/hr (12/09/17 1516)   Pharmacy to Dose enoxaparin (LOVENOX)     , PRN Meds:  •  acetaminophen  •  ALPRAZolam  •  aluminum-magnesium hydroxide-simethicone  •  bisacodyl  •  nitroglycerin  •  ondansetron  •  ondansetron **OR** ondansetron ODT **OR** ondansetron  •  Pharmacy to Dose enoxaparin (LOVENOX)  •  zolpidem and Allergies:  Review of patient's allergies indicates no known allergies.    Objective      Vital Signs  Temp:  [97.7 °F (36.5 °C)-98.4 °F (36.9 °C)] 97.7 °F (36.5 °C)  Heart Rate:  [] 100  Resp:  [16-20] 16  BP: (108-164)/(54-98) 138/63    Physical Exam   Constitutional: She is oriented to person, place, and time. She appears well-developed and well-nourished.   Cardiovascular: Normal rate, regular rhythm, normal heart sounds and intact distal pulses.    Pulmonary/Chest: Effort normal and breath sounds normal.   Abdominal: Soft. Bowel sounds are normal.   Neurological: She is alert and oriented to person, place, and time.   Skin: Skin is warm and dry.   Psychiatric: She has a normal mood and affect. Her behavior is normal. Judgment and thought content normal.   Nursing note and vitals reviewed.      Results Review:    I reviewed the patient's new clinical results.        Assessment/Plan     Active Problems:    Atrial fibrillation with rapid ventricular response    BARBIE (obstructive sleep apnea)    Nausea & vomiting    Hypokalemia    Abnormal weight loss      Assessment:  (Nausea and vomiting  Anorexia   Recent weight loss  Loss of appetite  Gallbladder dysfunction -not symptomatic  Bipolar Disorder/Depression-patient has not been taking her Topamax for a few weeks).     Plan:   (-Starting patient on Elavil 25mg to help with nausea  -May consider surgical consult in future for cholecystectomy  -Consulting psychiatry to bipolar disorder and  depression  -Possible endoscopy in future).       I discussed the patients findings and my recommendations with patient and nursing staff    Xochitl Jones, APRN  12/10/17  1:51 PM    Time: Critical care 20 min

## 2017-12-10 NOTE — PROGRESS NOTES
"Access Center Progress Note:  Pt is a 63 yo white single female.  Met with pt for an assessment prior to psychiatrist consult.  Pt is A&Ox4.  Pt was pleasant and cooperative with interview.  She was admitted for nausea and vomiting that has been going on for some time along with weight loss, metallic taste and decreased sleep.  Pt also has a history of Bipolar Disorder that was dx when she was around 28 yo.  Pt sees Dr. Harsh Rodriguez for outpatient care and has been working with him for around 17 or 18 years.  Pt states she is very sensitive to various medications and he has done a good job of adjusting her medications accordingly.  Pt has been on Topamax on and off over the years, most recently she has been taking it for about 1 year.     Pt's current home psych meds are Topamax 100 mg 2x day, Xanax .25 mg daily PRN, Ambien CR 12.5 mg nightly as needed.    Pt rates her current depression an \"8\" out of 10 with 10 being worst.  She rates her anxiety a \"9\".  Pt states her current medical issues have caused those numbers to be higher.  Pt denies any current SI/HI.  She states she has had SI decades ago but has never done anything to harm herself.  Pt acknowledges this past year has been a challenge with her moods and there have been times that she hasn't taken care of herself or her home as she should.  No psychosis present.  No etoh or drug issues.    Pt is very concerned that while being in the hospital the MD wants to add Elavil for her nausea.  Pt states she is very sensitive to medications and she believes that she has not done well with tricyclics in the past.  Pt does not particularly want another psychiatrist giving input and would rather discuss her medications with Dr. Rodriguez by phone tomorrow.  Collaborated with RN about the meds and the psych consult.  RN will discuss with MD about cancelling consult as well as about pt's concerns taking Elavil.    "

## 2017-12-10 NOTE — PLAN OF CARE
Problem: Patient Care Overview (Adult)  Goal: Plan of Care Review  Outcome: Ongoing (interventions implemented as appropriate)    12/10/17 0550   Coping/Psychosocial Response Interventions   Plan Of Care Reviewed With patient   Patient Care Overview   Progress improving   Outcome Evaluation   Outcome Summary/Follow up Plan Pt admitted for new onset A-fib. Started on cardizem gtt in ED. Received CT of abd and pelvis that showed gallstones, diverticulitis, and calcification. Consult to cardiology ordered and U/S of gallbladder ordered. Cardizem weaned to 5mcg/kg/min with a HR 60s-80s. Continue to monitor.         Problem: Arrhythmia/Dysrhythmia (Symptomatic) (Adult)  Goal: Signs and Symptoms of Listed Potential Problems Will be Absent or Manageable (Arrhythmia/Dysrhythmia)  Outcome: Ongoing (interventions implemented as appropriate)    12/10/17 0550   Arrhythmia/Dysrhythmia (Symptomatic)   Problems Assessed (Arrhythmia/Dysrhythmia) all   Problems Present (Arrhythmia/Dysrhythmia) situational response         Problem: Fall Risk (Adult)  Goal: Identify Related Risk Factors and Signs and Symptoms  Outcome: Ongoing (interventions implemented as appropriate)    12/10/17 0550   Fall Risk   Fall Risk: Related Risk Factors environment unfamiliar;gait/mobility problems;slipper/uneven surfaces   Fall Risk: Signs and Symptoms presence of risk factors       Goal: Absence of Falls  Outcome: Ongoing (interventions implemented as appropriate)    12/10/17 0550   Fall Risk (Adult)   Absence of Falls making progress toward outcome

## 2017-12-10 NOTE — H&P
Name: Bowen Concepcion ADMIT: 2017   : 1955  PCP: Aubrey Pitts MD    MRN: [id number] LOS: 0 days   AGE/SEX: 62 y.o. female  ROOM: [facility]/     Chief Complaint   Patient presents with   • Nausea   • Vomiting        History of Present Illness  62-year-old female who presented to the ER with complaints of nausea and anorexia that started a couple of months ago.  She has a history of obesity and has been on various dietary regimens.  A few months ago she was on Nutrisystem and she noticed that she was having recurring problems with nausea and she started just losing her appetite.  Foods did not sound appealing to her and she noticed that a lot of them tasted strange to her.  The nausea was not a daily event but she still was not able to get her appetite back.  She also reports recurring problems with sleep .  She has generic Ambien but some nights can't sleep even with taking that. She denies any abdominal pain.  She has lost 40 pounds since September.  She has stopped the majority of her home medications at least over the previous 2 weeks to see if that would help.  She can't tell if there has been any improvement.  Yesterday she started with repeated episodes of emesis and when she was unable to keep anything down, she came on to the ER.  At that time she was found to be in atrial fibrillation with a rapid ventricular response and was started on a diltiazem drip.  I was called to admit her.  She is on Topamax for depression but reports that she has been on this off and on over a number of years and she had not previously experienced this type of weight loss and anorexia.      Past Medical History:   Diagnosis Date   • Arthritis     OSTEOARTHRITIS RIGHT KNEE AND INDEX FINGERS   • Atrial fibrillation    • Bipolar disorder    • Depression    • Sleep apnea      Past Surgical History:   Procedure Laterality Date   • COLONOSCOPY  2004       Allergies:  Review of patient's allergies indicates no known  allergies.    Prescriptions Prior to Admission   Medication Sig Dispense Refill Last Dose   • ALPRAZolam (XANAX) 0.25 MG tablet Take 0.25 mg by mouth Daily As Needed for Anxiety.      • Armodafinil 250 MG tablet Take 125 mg by mouth Daily. BEEN TAKING 1/2 OF WHAT IS PRESCRIBED   Taking   • aspirin (ROBINSON ASPIRIN EXTRA STRENGTH) 500 MG tablet Take 500 mg by mouth Daily As Needed for Mild Pain .   Taking   • Cholecalciferol (VITAMIN D) 2000 units capsule Take 2,000 Units by mouth Daily.   Taking   • famotidine (PEPCID) 10 MG tablet Take 10 mg by mouth 2 (Two) Times a Day.      • naproxen (NAPROSYN) 500 MG tablet Take 500 mg by mouth Every 12 (Twelve) Hours As Needed. FOR KNEE PAIN   Taking   • propranolol (INDERAL) 10 MG tablet Take 10 mg by mouth Daily As Needed.   Taking   • topiramate (TOPAMAX) 100 MG tablet Take 100 mg by mouth 2 (Two) Times a Day.   Taking   • zolpidem CR (AMBIEN CR) 12.5 MG CR tablet Take 12.5 mg by mouth At Night As Needed for Sleep.      • azelastine (ASTELIN) 0.1 % nasal spray 2 sprays into each nostril 2 (Two) Times a Day. Use in each nostril as directed   Taking   • fluticasone (FLONASE) 50 MCG/ACT nasal spray 2 sprays into each nostril Daily.   Taking   • ibuprofen (ADVIL,MOTRIN) 200 MG tablet Take 200 mg by mouth As Needed for Mild Pain .   Taking   • Multiple Vitamins-Minerals (PRESERVISION AREDS 2 PO) Take 1 tablet by mouth Daily.   Taking   • zolpidem (AMBIEN) 10 MG tablet Take 10 mg by mouth At Night As Needed.   Taking       Social History   Substance Use Topics   • Smoking status: Former Smoker     Packs/day: 2.00     Years: 26.00   • Smokeless tobacco: Never Used   • Alcohol use No       Family History   Problem Relation Age of Onset   • Heart disease Mother    • Hypertension Mother    • Depression Mother    • Cancer Father    • Depression Sister    • Depression Brother    • Depression Brother    • Depression Brother    • Depression Sister    • Vision loss Maternal Grandmother    •  Vision loss Paternal Grandmother    • Breast cancer Maternal Aunt    • Breast cancer Maternal Aunt        Review of Systems   Constitutional:   HEENT: No vision changes. No rhinorrhea, sore throat.  No change in hearing.   Respiratory: no cough. Has BARBIE & wears CPAP regularly.  Cardiovascular: No chest pain or palpitations.  No problems with edema  Gastrointestinal:  She has chronic constipation.  Denies any blood in the stool.  She had a colonoscopy about 13 years ago.  Spicy foods or eating late will cause her to have heartburn   Endocrine: No history of diabetes.   Genitourinary: No difficulty urinating, dysuria and frequency.   Musculoskeletal: has OA in right knee. Has chronic lower back pain  Skin: No rash  Neurological: Gets times when she will have more headaches but not a constant problem. No persistent paresthesias, but gets tingling in feet when she has gained a lot of wt.  Hematological:  Does bruise easily. No h/o DVTs  Psychiatric/Behavioral: No confusion. The patient is anxious.  She takes Inderal for panic attacks that she gets when she drives       Objective    Vital Signs  Temp:  [98.2 °F (36.8 °C)-98.4 °F (36.9 °C)] 98.2 °F (36.8 °C)  Heart Rate:  [] 115  Resp:  [17-20] 20  BP: ()/(54-92) 116/92  SpO2:  [94 %-100 %] 96 %  on   ;   O2 Device: room air  Body mass index is 42.51 kg/(m^2).    Physical Exam   Obese female in NAD  PERRL, EOMI, sclera nonicteric. Oropharynx benign, tongue midline, palate elevates symmetrically. Neck supple without adenopathy or thyromegaly. No JVD.  Lungs clear with equal breath sounds bilaterally. No wheezes, rales or rhonchi. Breathing nonlabored  Heart RRR without murmur, gallop or rub.   Back no kyphosis  Abdomen soft, nontender, bowel sounds present throughout.  Extremities no cyanosis, clubbing or edema.   Skin warm & dry  Neuro no gross motor deficits, alert & oriented. Speech fluent.       Results Review:   I reviewed the patient's new clinical  results.    Lab Results (last 24 hours)     Procedure Component Value Units Date/Time    CBC & Differential [[id number]] Collected:  12/09/17 1046    Specimen:  Blood Updated:  12/09/17 1103    Narrative:       The following orders were created for panel order CBC & Differential.  Procedure                               Abnormality         Status                     ---------                               -----------         ------                     CBC Auto Differential[[id number]]        Abnormal            Final result                 Please view results for these tests on the individual orders.    Comprehensive Metabolic Panel [[id number]]  (Abnormal) Collected:  12/09/17 1046    Specimen:  Blood Updated:  12/09/17 1114     Glucose 131 (H) mg/dL      BUN 14 mg/dL      Creatinine 0.69 mg/dL      Sodium 141 mmol/L      Potassium 3.2 (L) mmol/L      Chloride 101 mmol/L      CO2 22.0 mmol/L      Calcium 9.5 mg/dL      Total Protein 7.6 g/dL      Albumin 4.20 g/dL      ALT (SGPT) 22 U/L      AST (SGOT) 24 U/L      Alkaline Phosphatase 95 U/L      Total Bilirubin 0.5 mg/dL      eGFR Non African Amer 86 mL/min/1.73      Globulin 3.4 gm/dL      A/G Ratio 1.2 g/dL      BUN/Creatinine Ratio 20.3     Anion Gap 18.0 mmol/L     Lipase [[id number]]  (Normal) Collected:  12/09/17 1046    Specimen:  Blood Updated:  12/09/17 1114     Lipase 15 U/L     CBC Auto Differential [[id number]]  (Abnormal) Collected:  12/09/17 1046    Specimen:  Blood Updated:  12/09/17 1103     WBC 9.37 10*3/mm3      RBC 5.64 (H) 10*6/mm3      Hemoglobin 15.4 g/dL      Hematocrit 46.3 (H) %      MCV 82.1 fL      MCH 27.3 pg      MCHC 33.3 g/dL      RDW 15.6 (H) %      RDW-SD 46.7 fl      MPV 9.9 fL      Platelets 264 10*3/mm3      Neutrophil % 75.3 %      Lymphocyte % 17.1 (L) %      Monocyte % 6.9 %      Eosinophil % 0.3 %      Basophil % 0.2 %      Immature Grans % 0.2 %      Neutrophils, Absolute 7.05 10*3/mm3      Lymphocytes, Absolute 1.60 10*3/mm3       Monocytes, Absolute 0.65 10*3/mm3      Eosinophils, Absolute 0.03 10*3/mm3      Basophils, Absolute 0.02 10*3/mm3      Immature Grans, Absolute 0.02 10*3/mm3     Troponin [[id number]]  (Normal) Collected:  12/09/17 1046    Specimen:  Blood Updated:  12/09/17 1408     Troponin T <0.010 ng/mL     Narrative:       Troponin T Reference Ranges:  Less than 0.03 ng/mL:    Negative for AMI  0.03 to 0.09 ng/mL:      Indeterminant for AMI  Greater than 0.09 ng/mL: Positive for AMI    Magnesium [[id number]]  (Normal) Collected:  12/09/17 1046    Specimen:  Blood Updated:  12/09/17 1354     Magnesium 2.0 mg/dL     BNP [[id number]]  (Abnormal) Collected:  12/09/17 1046    Specimen:  Blood Updated:  12/09/17 1408     proBNP 1058.0 (H) pg/mL     Narrative:       Among patients with dyspnea, NT-proBNP is highly sensitive for the detection of acute congestive heart failure. In addition NT-proBNP of <300 pg/ml effectively rules out acute congestive heart failure with 99% negative predictive value.    TSH [[id number]]  (Normal) Collected:  12/09/17 1046    Specimen:  Blood Updated:  12/09/17 1408     TSH 0.462 mIU/mL     T4, Free [[id number]]  (Abnormal) Collected:  12/09/17 1046    Specimen:  Blood Updated:  12/09/17 1408     Free T4 2.52 (H) ng/dL     Urinalysis With / Culture If Indicated - Urine, Clean Catch [[id number]]  (Abnormal) Collected:  12/09/17 1211    Specimen:  Urine from Urine, Clean Catch Updated:  12/09/17 1250     Color, UA Yellow     Appearance, UA Cloudy (A)     pH, UA 6.0     Specific Gravity, UA 1.015     Glucose, UA Negative     Ketones, UA 80 mg/dL (3+) (A)     Bilirubin, UA Negative     Blood, UA Negative     Protein, UA Trace (A)     Leuk Esterase, UA Large (3+) (A)     Nitrite, UA Negative     Urobilinogen, UA 1.0 E.U./dL    Urinalysis, Microscopic Only - Urine, Clean Catch [[id number]]  (Abnormal) Collected:  12/09/17 1211    Specimen:  Urine from Urine, Clean Catch Updated:  12/09/17 1250     RBC, UA 0-2  /HPF      WBC, UA 21-30 (A) /HPF      Bacteria, UA 1+ (A) /HPF      Squamous Epithelial Cells, UA 0-2 /HPF      Hyaline Casts, UA 7-12 /LPF      Methodology Manual Light Microscopy    Urine Culture - Urine, Urine, Clean Catch [[id number]] Collected:  12/09/17 1211    Specimen:  Urine from Urine, Clean Catch Updated:  12/09/17 1232            Results from last 7 days  Lab Units 12/09/17  1046   WBC 10*3/mm3 9.37   HEMOGLOBIN g/dL 15.4   PLATELETS 10*3/mm3 264       Results from last 7 days  Lab Units 12/09/17  1046   SODIUM mmol/L 141   POTASSIUM mmol/L 3.2*   CHLORIDE mmol/L 101   CO2 mmol/L 22.0   BUN mg/dL 14   CREATININE mg/dL 0.69   GLUCOSE mg/dL 131*   ALBUMIN g/dL 4.20   BILIRUBIN mg/dL 0.5   ALK PHOS U/L 95   AST (SGOT) U/L 24   ALT (SGPT) U/L 22   Estimated Creatinine Clearance: 95 mL/min (by C-G formula based on Cr of 0.69).    Results from last 7 days  Lab Units 12/09/17  1046   TROPONIN T ng/mL <0.010   PROBNP pg/mL 1058.0*         Results from last 7 days  Lab Units 12/09/17  1211   NITRITE UA  Negative   WBC UA /HPF 21-30*   BACTERIA UA /HPF 1+*   SQUAM EPITHEL UA /HPF 0-2       CT Abdomen Pelvis With Contrast    (Results Pending)     Assessment/Plan   Assessment:     Active Hospital Problems (** Indicates Principal Problem)    Diagnosis Date Noted   • Atrial fibrillation with rapid ventricular response [I48.91] 12/09/2017   • BARBIE (obstructive sleep apnea) [G47.33] 12/09/2017   • Nausea & vomiting [R11.2] 12/09/2017   • Hypokalemia [E87.6] 12/09/2017   • Abnormal weight loss [R63.4] 12/09/2017      Resolved Hospital Problems    Diagnosis Date Noted Date Resolved   No resolved problems to display.       Plan:     Her heart rate is still staying fairly elevated on the maximum amount on the diltiazem drip so I ordered a dose of IV digoxin.  I'm going to ask cardiology to see her.  I'm sure they will recommend putting her on amiodarone but I wanted to hold off on this since it can cause disturbances with the  taste of foods and cause anorexia itself.  A CT of the abdomen and pelvis will be obtained.  I also think that the gallbladder should be looked at as a possible cause of her symptoms, but I suspect that it could be her medications and actually are causing the problem.  Topamax is known to cause weight loss and anorexia.  She has been on this previously and has not experienced these symptoms so I think other etiologies need to be looked into before deciding if the Topamax.    I discussed the patients findings and my recommendations with pt  the patient was seen on 12/9/2017 although this dictation is occurring later        Ana Beauchamp MD  [facility]  12/09/17  9:54 PM

## 2017-12-10 NOTE — PROGRESS NOTES
"   LOS: 1 day   Patient Care Team:  Aubrey Pitts MD as PCP - General (Emergency Medicine)  Bright Rodriguez MD as Consulting Physician (Psychiatry)    Chief Complaint: tired    Subjective     HPI Comments: Feels about the same today. Poor appetite, poor sleep, nausea. No SOA or palp, no chest pain.    Nausea   Associated symptoms include anorexia, nausea and weakness. Pertinent negatives include no chest pain, coughing, fever or vomiting.   Vomiting    Pertinent negatives include no chest pain, coughing, diarrhea or fever.       Subjective:  Symptoms:  Stable.  She reports malaise, weakness, anorexia and anxiety.  No shortness of breath, cough, chest pain, headache, chest pressure or diarrhea.    Diet:  Poor intake.  She reports  nausea.  No vomiting.    Activity level: Impaired due to weakness.    Pain:  She reports no pain.        History taken from: patient chart    Objective     Vital Signs  Temp:  [98.2 °F (36.8 °C)-98.4 °F (36.9 °C)] 98.2 °F (36.8 °C)  Heart Rate:  [] 87  Resp:  [16-20] 18  BP: ()/(54-98) 135/84    Objective:  General Appearance:  Comfortable and in no acute distress.    Vital signs: (most recent): Blood pressure 135/84, pulse 87, temperature 98.2 °F (36.8 °C), temperature source Oral, resp. rate 18, height 157.5 cm (62\"), weight 107 kg (235 lb 4.8 oz), SpO2 96 %, not currently breastfeeding.  Vital signs are normal.  No fever.  (Tachycardic).    Output: Producing urine and minimal stool output.    HEENT: Normal HEENT exam.    Lungs:  Normal respiratory rate and normal effort.  Breath sounds clear to auscultation.    Heart: Normal rate.  Irregular rhythm.  No murmur.   Abdomen: Abdomen is soft.  (Obese)Bowel sounds are normal.   There is no abdominal tenderness.     Extremities: There is dependent edema (trace, chronic).    Pulses: Distal pulses are intact.    Neurological: Patient is alert and oriented to person, place and time.    Pupils:  Pupils are equal, round, and " reactive to light.    Skin:  Warm and dry.              Results Review:     I reviewed the patient's new clinical results.  I reviewed the patient's new imaging results and agree with the interpretation.  I reviewed the patient's other test results and agree with the interpretation  I personally viewed and interpreted the patient's EKG/Telemetry data  Discussed with patient    Medication Review: reviewed    Assessment/Plan     Active Problems:    Atrial fibrillation with rapid ventricular response    BARBIE (obstructive sleep apnea)    Nausea & vomiting    Hypokalemia    Abnormal weight loss      Assessment:  (1. AFib with RVR  2. UTI  3. Abnl TFTs  4. Weight loss  5. Nausea and anorexia  6. Insomnia  7. HypoK+--resolved  8. BARBIE  9. Constipation  10. OA  11. Bipolar d/o).     Plan:   (?hyperthyroidism--free T4 not impressively high and TSH normal, but her constellation of symptoms could certainly be explained by hyperthyroidism  Will ask Endo to see  Continue diltiazem gtt  Await Card eval  Severe YASMIN on echo  Ask GI to see  HIDA scan pending, GB U/S and CT A&P okay  Continue Rocephin and await urine culture  Bowel regimen for constipation).       Vlad Cuellar MD  12/10/17  12:09 PM    Time: 45min

## 2017-12-10 NOTE — CONSULTS
"Adult Nutrition  Assessment/PES    Patient Name:  Bowen Concepcion  YOB: 1955  MRN: [id number]  Admit Date:  12/9/2017    Assessment Date:  12/10/2017    Comments:  Nursing nutrition screen trigger.  W/u for abnormal weight loss. BMI 43.  Will follow and be available for education as needed.           Reason for Assessment       12/10/17 1318    Reason for Assessment    Reason For Assessment/Visit identified at risk by screening criteria    Identified At Risk By Screening Criteria MST SCORE 2+    Diagnosis Diagnosis    Psychosocial Depression    Factors Affecting Nutrition Factors    Appetite Poor    Reported GI Symptoms N & V              Nutrition/Diet History       12/10/17 1319    Nutrition/Diet History    Typical Food/Fluid Intake Was trying to lose weight with [employer]ystem about 1 month ago, weight loss progressed, poor appetite,taste changes, N/V.              Anthropometrics       12/10/17 1321    Anthropometrics    Height 157.5 cm (62.01\")    RD Documented Current Weight  107 kg (235 lb)    Anthropometrics (Special Considerations)    RD Calculated     RD Calculated %     RD Calculated BMI (kg/m2) 43.04    Ideal Body Weight (IBW)    Ideal Body Weight (IBW), Female 50.85    Usual Body Weight (UBW)    Weight Loss 4.536 kg (10 lb)    Weight Loss Time Frame 2 months    Body Mass Index (BMI)    BMI Grade greater than 40 - obesity grade III            Labs/Tests/Procedures/Meds       12/10/17 1324    Labs/Tests/Procedures/Meds    Diagnostic Test/Procedure Review reviewed    Labs/Tests Review Reviewed    Medication Review Reviewed, pertinent;Laxative;Antibiotic    Significant Vitals reviewed            Physical Findings       12/10/17 1324    Physical Findings/Assessment    Additional Documentation Physical Appearance (Group)    Physical Appearance    Overall Physical Appearance obese;overweight            Estimated/Assessed Needs       12/10/17 1325    Calculation Measurements    Weight " Used For Calculations 107 kg (235 lb)    Estimated/Assessed Energy Needs    Energy Need Method Kcal/kg    kcal/kg 15    15 Kcal/Kg (kcal) 1598.92    Estimated Kcal Range  1600 calories for weight loss 1-2# per week    Estimated/Assessed Protein Needs    Weight Used for Protein Calculation 107 kg (235 lb)    Protein (gm/kg) 0.8    0.8 Gm Protein (gm) 85.28            Nutrition Prescription Ordered       12/10/17 1324    Nutrition Prescription PO    Current PO Diet Regular            Evaluation of Received Nutrient/Fluid Intake       12/10/17 1324    Evaluation of Received Nutrient/Fluid Intake    Nutrition Delivered Fluid Evaluation    Fluid Intake Evaluation    IV Fluid (mL) 1800    Total Fluid Intake (mL) 1800    PO Evaluation    Number of Meals 2    % PO Intake 75%            Problem/Interventions:        Problem 1       12/10/17 1326    Nutrition Diagnoses Problem 1    Problem 1 Nutrition Appropriate for Condition at this Time    Etiology (related to) Factors Affecting Nutrition    Appetite Poor at this Time    Signs/Symptoms (evidenced by) Report of Mnimal PO Intake;Unintended Weight Change    Unintended Weight Change Loss    Number of Pounds Lost 40# reported    Weight loss time period 4 months                    Intervention Goal       12/10/17 1327    Intervention Goal    General Maintain nutrition    PO Tolerate PO    Weight Appropriate weight loss            Nutrition Intervention       12/10/17 1327    Nutrition Intervention    RD/Tech Action Follow Tx progress;Care plan reviewd              Education/Evaluation       12/10/17 1327    Education    Education Will Instruct as appropriate    Monitor/Evaluation    Monitor Per protocol        Electronically signed by:  Lotus Dumas RD  12/10/17 1:27 PM

## 2017-12-10 NOTE — CONSULTS
62 y.o.  Patient Care Team:  Aubrey Pitts MD as PCP - General (Emergency Medicine)  Bright Rodriguez MD as Consulting Physician (Psychiatry)      Chief Complaint   Patient presents with   • Nausea   • Vomiting   Abnormal thyroid function suspicious for hyperthyroidism    HPI   Patient is a 62-year-old white female admitted to the hospital through the emergency room for symptoms of nausea and anorexia and significant weight loss over the past several months  Patient apparently was following Nutrisystem diet and was noticing nausea and also appetite and sleep problems.  She has been taking Ambien for sleep  She apparently lost nearly 40 pounds in the past 3 months.  Her nausea appears to be worsening and started vomiting yesterday hence she came to the emergency room.  She was noted to be in atrial fibrillation with rapid ventricular response and was started on Cardizem drip and admitted to the hospital  Patient is not treated with amiodarone.  Patient reports that she is on Topamax but denies any prior use of lithium or amiodarone  Patient does report to heat intolerance, sweating and occasional tremors.  Insomnia has been a serious issue for the past few months  Patient is unaware of any thyroid dysfunction in the past  Patient also denies any recent contrast studies prior to admission    Patient denies any difficulty swallowing or change in voice  Patient is evaluated by cardiology and is currently on medication for atrial fibrillation  Further evaluation since admission suggested gallbladder sludge and stones but apparently not considered to be the etiology for nausea         Past Medical History:   Diagnosis Date   • Arthritis     OSTEOARTHRITIS RIGHT KNEE AND INDEX FINGERS   • Atrial fibrillation    • Bipolar disorder    • Depression    • Sleep apnea        Family History   Problem Relation Age of Onset   • Heart disease Mother    • Hypertension Mother    • Depression Mother    • Cancer Father    •  Depression Sister    • Depression Brother    • Depression Brother    • Depression Brother    • Depression Sister    • Vision loss Maternal Grandmother    • Vision loss Paternal Grandmother    • Breast cancer Maternal Aunt    • Breast cancer Maternal Aunt        Social History     Social History   • Marital status: Single     Spouse name: N/A   • Number of children: N/A   • Years of education: N/A     Occupational History   • Not on file.     Social History Main Topics   • Smoking status: Former Smoker     Packs/day: 2.00     Years: 26.00   • Smokeless tobacco: Never Used   • Alcohol use No   • Drug use: No   • Sexual activity: No     Other Topics Concern   • Not on file     Social History Narrative       No Known Allergies      Current Facility-Administered Medications:   •  acetaminophen (TYLENOL) tablet 650 mg, 650 mg, Oral, Q4H PRN, Ana Beauchamp MD  •  ALPRAZolam (XANAX) tablet 0.25 mg, 0.25 mg, Oral, Daily PRN, Ana Beauchamp MD  •  aluminum-magnesium hydroxide-simethicone (MAALOX MAX) 400-400-40 MG/5ML suspension 15 mL, 15 mL, Oral, Q6H PRN, Ana Beauchamp MD  •  amitriptyline (ELAVIL) tablet 25 mg, 25 mg, Oral, Nightly, ARYAN Shanks  •  bisacodyl (DULCOLAX) EC tablet 5 mg, 5 mg, Oral, Daily PRN, Ana Beauchamp MD  •  cefTRIAXone (ROCEPHIN) in SWFI 1 gram/10ml IV PUSH syringe, 1 g, Intravenous, Q24H, Vlad Cuellar MD, 1,000 mg at 12/10/17 1548  •  dextrose 5 % and sodium chloride 0.9 % with KCl 40 mEq/L infusion, 75 mL/hr, Intravenous, Continuous, Ana Beauchamp MD, Last Rate: 75 mL/hr at 12/10/17 1307, 75 mL/hr at 12/10/17 1307  •  diltiaZEM (CARDIZEM) 100 mg/100 mL (1 mg/mL) 0.9% NS, 5-15 mg/hr, Intravenous, Titrated, Ana Beauchamp MD, Last Rate: 5 mL/hr at 12/10/17 0658, 5 mg/hr at 12/10/17 0658  •  diltiaZEM (CARDIZEM) IVPB 100 mg/100 mL (1 mg/mL) NS (add-vantage), 5-15 mg/hr, Intravenous, Continuous, Kj Vasquez MD, Last Rate: 15 mL/hr at 12/09/17  1516, 15 mg/hr at 12/09/17 1516  •  enoxaparin (LOVENOX) syringe 110 mg, 1 mg/kg, Subcutaneous, Q12H, Ana Beauchamp MD, 110 mg at 12/10/17 1848  •  famotidine (PEPCID) tablet 10 mg, 10 mg, Oral, BID, Ana Beauchamp MD, 10 mg at 12/10/17 1847  •  metoprolol tartrate (LOPRESSOR) tablet 25 mg, 25 mg, Oral, Q12H, Victoriano Murdock III, MD, 25 mg at 12/10/17 1317  •  nitroglycerin (NITROSTAT) SL tablet 0.4 mg, 0.4 mg, Sublingual, Q5 Min PRN, Ana Beauchamp MD  •  ondansetron (ZOFRAN) injection 4 mg, 4 mg, Intravenous, Q6H PRN, Ana Beauchamp MD  •  ondansetron (ZOFRAN) tablet 4 mg, 4 mg, Oral, Q6H PRN **OR** ondansetron ODT (ZOFRAN-ODT) disintegrating tablet 4 mg, 4 mg, Oral, Q6H PRN **OR** ondansetron (ZOFRAN) injection 4 mg, 4 mg, Intravenous, Q6H PRN, Ana Beauchamp MD, 4 mg at 12/09/17 1846  •  Pharmacy to Dose enoxaparin (LOVENOX), , Does not apply, Continuous PRN, Ana Beauchamp MD  •  polyethylene glycol 3350 powder (packet), 17 g, Oral, Daily, Vlad Cuellar MD  •  sennosides-docusate sodium (SENOKOT-S) 8.6-50 MG tablet 2 tablet, 2 tablet, Oral, BID, Ana Beauchamp MD, 2 tablet at 12/10/17 1847  •  sennosides-docusate sodium (SENOKOT-S) 8.6-50 MG tablet 2 tablet, 2 tablet, Oral, Nightly, Vlad Cuelalr MD  •  zolpidem (AMBIEN) tablet 5 mg, 5 mg, Oral, Nightly PRN, Ana Beauchamp MD         Review of Systems   Constitutional: Positive for diaphoresis and fatigue.   Respiratory: Positive for shortness of breath. Negative for stridor.    Cardiovascular: Positive for palpitations. Negative for chest pain.   Gastrointestinal: Positive for abdominal distention, nausea and vomiting.   Endocrine: Positive for heat intolerance.   Musculoskeletal: Negative.    Neurological: Negative.    Psychiatric/Behavioral: The patient is nervous/anxious.    All other systems reviewed and are negative.    Objective     Vital Signs  Temp:  [97.7 °F (36.5 °C)-98.2 °F (36.8 °C)] 97.7 °F (36.5  °C)  Heart Rate:  [] 100  Resp:  [16-20] 16  BP: (113-164)/(62-98) 138/63    Physical Exam  Physical Exam   Constitutional: She is oriented to person, place, and time. She appears well-developed and well-nourished.   Eyes: EOM are normal. Pupils are equal, round, and reactive to light.   Neck: Normal range of motion. Neck supple.   Cardiovascular: Normal rate, regular rhythm, normal heart sounds and intact distal pulses.    Pulmonary/Chest: Effort normal and breath sounds normal.   Abdominal: Soft. Bowel sounds are normal.   Neurological: She is alert and oriented to person, place, and time.   Skin: Skin is warm and dry.   Nursing note and vitals reviewed.      Results Review:    I reviewed the patient's new clinical results.  No results found for: POCGLU  Lab Results (last 72 hours)     Procedure Component Value Units Date/Time    CBC & Differential [[id number]] Collected:  12/09/17 1046    Specimen:  Blood Updated:  12/09/17 1103    Narrative:       The following orders were created for panel order CBC & Differential.  Procedure                               Abnormality         Status                     ---------                               -----------         ------                     CBC Auto Differential[[id number]]        Abnormal            Final result                 Please view results for these tests on the individual orders.    CBC Auto Differential [[id number]]  (Abnormal) Collected:  12/09/17 1046    Specimen:  Blood Updated:  12/09/17 1103     WBC 9.37 10*3/mm3      RBC 5.64 (H) 10*6/mm3      Hemoglobin 15.4 g/dL      Hematocrit 46.3 (H) %      MCV 82.1 fL      MCH 27.3 pg      MCHC 33.3 g/dL      RDW 15.6 (H) %      RDW-SD 46.7 fl      MPV 9.9 fL      Platelets 264 10*3/mm3      Neutrophil % 75.3 %      Lymphocyte % 17.1 (L) %      Monocyte % 6.9 %      Eosinophil % 0.3 %      Basophil % 0.2 %      Immature Grans % 0.2 %      Neutrophils, Absolute 7.05 10*3/mm3      Lymphocytes, Absolute 1.60  10*3/mm3      Monocytes, Absolute 0.65 10*3/mm3      Eosinophils, Absolute 0.03 10*3/mm3      Basophils, Absolute 0.02 10*3/mm3      Immature Grans, Absolute 0.02 10*3/mm3     Comprehensive Metabolic Panel [[id number]]  (Abnormal) Collected:  12/09/17 1046    Specimen:  Blood Updated:  12/09/17 1114     Glucose 131 (H) mg/dL      BUN 14 mg/dL      Creatinine 0.69 mg/dL      Sodium 141 mmol/L      Potassium 3.2 (L) mmol/L      Chloride 101 mmol/L      CO2 22.0 mmol/L      Calcium 9.5 mg/dL      Total Protein 7.6 g/dL      Albumin 4.20 g/dL      ALT (SGPT) 22 U/L      AST (SGOT) 24 U/L      Alkaline Phosphatase 95 U/L      Total Bilirubin 0.5 mg/dL      eGFR Non African Amer 86 mL/min/1.73      Globulin 3.4 gm/dL      A/G Ratio 1.2 g/dL      BUN/Creatinine Ratio 20.3     Anion Gap 18.0 mmol/L     Lipase [[id number]]  (Normal) Collected:  12/09/17 1046    Specimen:  Blood Updated:  12/09/17 1114     Lipase 15 U/L     Urinalysis With / Culture If Indicated - Urine, Clean Catch [[id number]]  (Abnormal) Collected:  12/09/17 1211    Specimen:  Urine from Urine, Clean Catch Updated:  12/09/17 1250     Color, UA Yellow     Appearance, UA Cloudy (A)     pH, UA 6.0     Specific Gravity, UA 1.015     Glucose, UA Negative     Ketones, UA 80 mg/dL (3+) (A)     Bilirubin, UA Negative     Blood, UA Negative     Protein, UA Trace (A)     Leuk Esterase, UA Large (3+) (A)     Nitrite, UA Negative     Urobilinogen, UA 1.0 E.U./dL    Urinalysis, Microscopic Only - Urine, Clean Catch [[id number]]  (Abnormal) Collected:  12/09/17 1211    Specimen:  Urine from Urine, Clean Catch Updated:  12/09/17 1250     RBC, UA 0-2 /HPF      WBC, UA 21-30 (A) /HPF      Bacteria, UA 1+ (A) /HPF      Squamous Epithelial Cells, UA 0-2 /HPF      Hyaline Casts, UA 7-12 /LPF      Methodology Manual Light Microscopy    Magnesium [[id number]]  (Normal) Collected:  12/09/17 1046    Specimen:  Blood Updated:  12/09/17 1354     Magnesium 2.0 mg/dL     Troponin  [[id number]]  (Normal) Collected:  12/09/17 1046    Specimen:  Blood Updated:  12/09/17 1408     Troponin T <0.010 ng/mL     Narrative:       Troponin T Reference Ranges:  Less than 0.03 ng/mL:    Negative for AMI  0.03 to 0.09 ng/mL:      Indeterminant for AMI  Greater than 0.09 ng/mL: Positive for AMI    BNP [[id number]]  (Abnormal) Collected:  12/09/17 1046    Specimen:  Blood Updated:  12/09/17 1408     proBNP 1058.0 (H) pg/mL     Narrative:       Among patients with dyspnea, NT-proBNP is highly sensitive for the detection of acute congestive heart failure. In addition NT-proBNP of <300 pg/ml effectively rules out acute congestive heart failure with 99% negative predictive value.    TSH [[id number]]  (Normal) Collected:  12/09/17 1046    Specimen:  Blood Updated:  12/09/17 1408     TSH 0.462 mIU/mL     T4, Free [[id number]]  (Abnormal) Collected:  12/09/17 1046    Specimen:  Blood Updated:  12/09/17 1408     Free T4 2.52 (H) ng/dL     Basic Metabolic Panel [[id number]]  (Abnormal) Collected:  12/10/17 0512    Specimen:  Blood Updated:  12/10/17 0604     Glucose 112 (H) mg/dL      BUN 12 mg/dL      Creatinine 0.65 mg/dL      Sodium 142 mmol/L      Potassium 3.8 mmol/L      Chloride 106 mmol/L      CO2 23.2 mmol/L      Calcium 8.5 (L) mg/dL      eGFR Non African Amer 92 mL/min/1.73      BUN/Creatinine Ratio 18.5     Anion Gap 12.8 mmol/L     Narrative:       GFR Normal >60  Chronic Kidney Disease <60  Kidney Failure <15    CBC Auto Differential [[id number]]  (Abnormal) Collected:  12/10/17 0512    Specimen:  Blood Updated:  12/10/17 0611     WBC 7.64 10*3/mm3      RBC 4.77 10*6/mm3      Hemoglobin 12.7 g/dL      Hematocrit 40.1 %      MCV 84.1 fL      MCH 26.6 (L) pg      MCHC 31.7 (L) g/dL      RDW 16.0 (H) %      RDW-SD 48.7 fl      MPV 9.8 fL      Platelets 235 10*3/mm3      Neutrophil % 51.0 %      Lymphocyte % 37.2 %      Monocyte % 10.2 %      Eosinophil % 1.2 %      Basophil % 0.4 %      Immature Grans % 0.0 %       Neutrophils, Absolute 3.90 10*3/mm3      Lymphocytes, Absolute 2.84 10*3/mm3      Monocytes, Absolute 0.78 10*3/mm3      Eosinophils, Absolute 0.09 10*3/mm3      Basophils, Absolute 0.03 10*3/mm3      Immature Grans, Absolute 0.00 10*3/mm3     Urine Culture - Urine, Urine, Clean Catch [[id number]]  (Normal) Collected:  12/09/17 1211    Specimen:  Urine from Urine, Clean Catch Updated:  12/10/17 0633     Urine Culture Culture in progress          Imaging Results (last 72 hours)     Procedure Component Value Units Date/Time    CT Abdomen Pelvis With Contrast [[id number]] Collected:  12/09/17 2343     Updated:  12/09/17 2348    Narrative:       CT SCANS ABDOMEN AND PELVIS WITH IV CONTRAST.     HISTORY: abd pain; vomiting; I48.91-Unspecified atrial fibrillation     COMPARISON: None.     TECHNIQUE: Radiation dose reduction techniques were utilized, including  automated exposure control and exposure modulation based on body size.  Axial images were obtained from the lung bases to the symphysis pubis  with IV contrast only.. Oral contrast was not administered per request.     FINDINGS ABDOMEN CT:  Multiple gallstones are present without  gallbladder wall thickening or pericholecystic inflammation. The  remaining solid organs have an unremarkable appearance. The encompassed  aorta is without evidence of aneurysmal dilatation. No free fluid. The  appendix is not identified with certainty on this exam without oral  contrast.     FINDINGS PELVIS CT:  There are multiple uterine masses, some of which  are calcified, likely related to leiomyomas. There are scattered colonic  diverticuli. The GI tract was not opacified for assessment but is non  obstructive in appearance. There is abundant fecal material in the colon  possibly related to constipation. There is a small fat containing  umbilical hernia without associated inflammation.                Impression:       IMPRESSION :   1. Uncomplicated cholelithiasis.  2.  Constipation, colonic diverticulosis.  3. Multiple uterine masses 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.        This report was finalized on 12/9/2017 11:45 PM by Dennis Quiñonez MD.       US Gallbladder [[id number]] Collected:  12/10/17 0723     Updated:  12/10/17 0728    Narrative:       US GALLBLADDER-        INDICATIONS: Nausea     TECHNIQUE: Ultrasound of the right upper quadrant     COMPARISON: Correlated with CT from 12/09/2017     FINDINGS:     The gallbladder is nontender, but contains sludge and stones. No  gallbladder wall thickening or pericholecystic fluid.     No intrahepatic or extrahepatic biliary ductal dilatation is  demonstrated. The common biliary duct caliber is measured at 3.9 mm.     No liver lesion is identified. Diffusely, the echogenicity of the liver  is otherwise in the range of normal relative to that of the right  kidney.     The pancreas is partly obscured. The right kidney, 11.5 cm, and the  pancreas otherwise appear unremarkable.                Impression:          Sludge and stones in the gallbladder. No evidence for acute  cholecystitis.             This report was finalized on 12/10/2017 7:25 AM by Dr. Wilson Bolaños MD.       NM HIDA Scan With Pharmacological Intervention [[id number]] Collected:  12/10/17 0945     Updated:  12/10/17 0953    Narrative:       NM HIDA SCAN WITH PHARMACOLOGICAL INTERVENTION-        INDICATIONS: Nausea      TECHNIQUE: HEPATOBILIARY SCINTIGRAPHY     COMPARISON: None available. Correlated with the ultrasound from  12/10/2017, CT from 12/9/2017.     FINDINGS:      Radiotracer: 6 mCi technetium 99m Choletec, intravenous.     Anterior projections of the abdomen were obtained following radiotracer  administration. There is prompt visualization of the gallbladder  (although faintly), common biliary duct, and small bowel.     At 60 minutes, 2.1 mcg of cholecystokinin was administered  intravenously, with no reported symptomatology. Images were obtained at  1 minute intervals  for 30 minutes, revealing no obvious decrease in  gallbladder activity. Calculated gallbladder ejection fraction was 1%  (normal is greater than 35%).             Impression:             1. Prompt visualization of the gallbladder, which will exclude acute  cholecystitis in 95% of cases.  2. The gallbladder ejection fraction is decreased, consistent with  biliary dyskinesia.     This report was finalized on 12/10/2017 9:50 AM by Dr. Wilson Bolaños MD.             Assessment/Plan     Patient Active Problem List   Diagnosis   • Bipolar I disorder, single manic episode   • Acid reflux   • HLD (hyperlipidemia)   • LBP (low back pain)   • Atrial fibrillation with rapid ventricular response   • BARBIE (obstructive sleep apnea)   • Nausea & vomiting   • Hypokalemia   • Abnormal weight loss     The current thyroid evaluation suggested TSH is low at 0.4 and free T4 is elevated at 2.5  Patient denies any prior knowledge  Patient has few symptoms but is not classic  She did have a 40 pound weight loss which was rather unexpected for couple months of Nutrisystem diet  Patient denies having taken any weight loss medication including over-the-counter medication    I discussed further evaluation and management of hyperthyroidism   Will recheck TSH and free T4  If confirmed to be hyperthyroid I recommend that she might go on medication such as Tapazole or PTU for the time being to bring her into euthyroid range  Side effects of the medication reviewed in detail with the patient  Once confirmed further evaluation will include a thyroid ultrasound as well as radioactive iodine uptake and scan.  Patient apparently had CT scan with contrast in the emergency room so will have to wait at least 2-3 months before attempting radioiodine uptake and scan    Will try and get a thyroid ultrasound during this admission  We also try and define if she has any immunological basis for hyperthyroidism-will check TPO and TSI    I discussed my plan in  "detail with the patient she verbalized understanding  Will review the TSH and free T4 repeat values in the morning and then decide further management    The total time spent for old record and lab review and floor time was more than 110 min of which greater than 60 min of time was spent on counseling the patient on recommended evaluation and treatment options, instructions for management/treatment and /or follow up  and importance of compliance with chosen management or treatment options  Roman Pop MD FACE.  12/10/17  6:54 PM        EMR Dragon / transcription disclaimer:     \"Dictated utilizing Dragon dictation\".   "

## 2017-12-11 ENCOUNTER — INPATIENT HOSPITAL (OUTPATIENT)
Dept: URBAN - METROPOLITAN AREA HOSPITAL 113 | Facility: HOSPITAL | Age: 62
End: 2017-12-11
Payer: MEDICARE

## 2017-12-11 DIAGNOSIS — R63.0 ANOREXIA: ICD-10-CM

## 2017-12-11 DIAGNOSIS — R11.2 NAUSEA WITH VOMITING, UNSPECIFIED: ICD-10-CM

## 2017-12-11 DIAGNOSIS — R63.4 ABNORMAL WEIGHT LOSS: ICD-10-CM

## 2017-12-11 LAB
ALBUMIN SERPL-MCNC: 3.6 G/DL (ref 3.5–5.2)
ALBUMIN/GLOB SERPL: 1.3 G/DL
ALP SERPL-CCNC: 79 U/L (ref 39–117)
ALT SERPL W P-5'-P-CCNC: 39 U/L (ref 1–33)
ANION GAP SERPL CALCULATED.3IONS-SCNC: 10.7 MMOL/L
AST SERPL-CCNC: 26 U/L (ref 1–32)
BACTERIA SPEC AEROBE CULT: NORMAL
BACTERIA SPEC AEROBE CULT: NORMAL
BASOPHILS # BLD AUTO: 0.03 10*3/MM3 (ref 0–0.2)
BASOPHILS NFR BLD AUTO: 0.5 % (ref 0–1.5)
BILIRUB SERPL-MCNC: 0.4 MG/DL (ref 0.1–1.2)
BUN BLD-MCNC: 15 MG/DL (ref 8–23)
BUN/CREAT SERPL: 21.4 (ref 7–25)
CALCIUM SPEC-SCNC: 8.7 MG/DL (ref 8.6–10.5)
CHLORIDE SERPL-SCNC: 113 MMOL/L (ref 98–107)
CO2 SERPL-SCNC: 22.3 MMOL/L (ref 22–29)
CORTIS SERPL-MCNC: 22.72 MCG/DL
CREAT BLD-MCNC: 0.7 MG/DL (ref 0.57–1)
DEPRECATED RDW RBC AUTO: 51.5 FL (ref 37–54)
EOSINOPHIL # BLD AUTO: 0.12 10*3/MM3 (ref 0–0.7)
EOSINOPHIL NFR BLD AUTO: 1.9 % (ref 0.3–6.2)
ERYTHROCYTE [DISTWIDTH] IN BLOOD BY AUTOMATED COUNT: 16.7 % (ref 11.7–13)
GFR SERPL CREATININE-BSD FRML MDRD: 85 ML/MIN/1.73
GLOBULIN UR ELPH-MCNC: 2.7 GM/DL
GLUCOSE BLD-MCNC: 101 MG/DL (ref 65–99)
HCT VFR BLD AUTO: 42.8 % (ref 35.6–45.5)
HGB BLD-MCNC: 13.7 G/DL (ref 11.9–15.5)
IMM GRANULOCYTES # BLD: 0 10*3/MM3 (ref 0–0.03)
IMM GRANULOCYTES NFR BLD: 0 % (ref 0–0.5)
LIPASE SERPL-CCNC: 28 U/L (ref 13–60)
LYMPHOCYTES # BLD AUTO: 2.48 10*3/MM3 (ref 0.9–4.8)
LYMPHOCYTES NFR BLD AUTO: 38.8 % (ref 19.6–45.3)
MCH RBC QN AUTO: 27.1 PG (ref 26.9–32)
MCHC RBC AUTO-ENTMCNC: 32 G/DL (ref 32.4–36.3)
MCV RBC AUTO: 84.6 FL (ref 80.5–98.2)
MONOCYTES # BLD AUTO: 0.63 10*3/MM3 (ref 0.2–1.2)
MONOCYTES NFR BLD AUTO: 9.9 % (ref 5–12)
NEUTROPHILS # BLD AUTO: 3.13 10*3/MM3 (ref 1.9–8.1)
NEUTROPHILS NFR BLD AUTO: 48.9 % (ref 42.7–76)
PLATELET # BLD AUTO: 224 10*3/MM3 (ref 140–500)
PMV BLD AUTO: 9.7 FL (ref 6–12)
POTASSIUM BLD-SCNC: 4.6 MMOL/L (ref 3.5–5.2)
PROT SERPL-MCNC: 6.3 G/DL (ref 6–8.5)
RBC # BLD AUTO: 5.06 10*6/MM3 (ref 3.9–5.2)
SODIUM BLD-SCNC: 146 MMOL/L (ref 136–145)
T4 FREE SERPL-MCNC: 2.03 NG/DL (ref 0.93–1.7)
TSH SERPL DL<=0.05 MIU/L-ACNC: 0.58 MIU/ML (ref 0.27–4.2)
WBC NRBC COR # BLD: 6.39 10*3/MM3 (ref 4.5–10.7)

## 2017-12-11 PROCEDURE — 25010000002 ENOXAPARIN PER 10 MG: Performed by: INTERNAL MEDICINE

## 2017-12-11 PROCEDURE — 85025 COMPLETE CBC W/AUTO DIFF WBC: CPT | Performed by: HOSPITALIST

## 2017-12-11 PROCEDURE — 99233 SBSQ HOSP IP/OBS HIGH 50: CPT | Performed by: INTERNAL MEDICINE

## 2017-12-11 PROCEDURE — 80053 COMPREHEN METABOLIC PANEL: CPT | Performed by: HOSPITALIST

## 2017-12-11 PROCEDURE — 99232 SBSQ HOSP IP/OBS MODERATE 35: CPT | Performed by: INTERNAL MEDICINE

## 2017-12-11 PROCEDURE — 84443 ASSAY THYROID STIM HORMONE: CPT | Performed by: HOSPITALIST

## 2017-12-11 PROCEDURE — 84439 ASSAY OF FREE THYROXINE: CPT | Performed by: HOSPITALIST

## 2017-12-11 PROCEDURE — 83690 ASSAY OF LIPASE: CPT | Performed by: HOSPITALIST

## 2017-12-11 PROCEDURE — 82533 TOTAL CORTISOL: CPT | Performed by: HOSPITALIST

## 2017-12-11 RX ORDER — METOPROLOL SUCCINATE 50 MG/1
50 TABLET, EXTENDED RELEASE ORAL DAILY
Status: DISCONTINUED | OUTPATIENT
Start: 2017-12-11 | End: 2017-12-12 | Stop reason: HOSPADM

## 2017-12-11 RX ORDER — DILTIAZEM HYDROCHLORIDE 120 MG/1
120 CAPSULE, COATED, EXTENDED RELEASE ORAL
Status: DISCONTINUED | OUTPATIENT
Start: 2017-12-11 | End: 2017-12-12 | Stop reason: HOSPADM

## 2017-12-11 RX ORDER — METHIMAZOLE 10 MG/1
10 TABLET ORAL DAILY
Status: DISCONTINUED | OUTPATIENT
Start: 2017-12-11 | End: 2017-12-12 | Stop reason: HOSPADM

## 2017-12-11 RX ADMIN — FAMOTIDINE 10 MG: 10 TABLET, FILM COATED ORAL at 18:38

## 2017-12-11 RX ADMIN — DOCUSATE SODIUM -SENNOSIDES 2 TABLET: 50; 8.6 TABLET, COATED ORAL at 08:32

## 2017-12-11 RX ADMIN — METOPROLOL TARTRATE 25 MG: 25 TABLET ORAL at 08:32

## 2017-12-11 RX ADMIN — DILTIAZEM HYDROCHLORIDE 5 MG/HR: 100 INJECTION, POWDER, LYOPHILIZED, FOR SOLUTION INTRAVENOUS at 08:39

## 2017-12-11 RX ADMIN — METHIMAZOLE 10 MG: 10 TABLET ORAL at 21:25

## 2017-12-11 RX ADMIN — DILTIAZEM HYDROCHLORIDE 120 MG: 120 CAPSULE, COATED, EXTENDED RELEASE ORAL at 12:00

## 2017-12-11 RX ADMIN — METOPROLOL SUCCINATE 50 MG: 50 TABLET, FILM COATED, EXTENDED RELEASE ORAL at 18:38

## 2017-12-11 RX ADMIN — ENOXAPARIN SODIUM 110 MG: 120 INJECTION SUBCUTANEOUS at 05:13

## 2017-12-11 RX ADMIN — ZOLPIDEM TARTRATE 5 MG: 5 TABLET ORAL at 22:37

## 2017-12-11 RX ADMIN — FAMOTIDINE 10 MG: 10 TABLET, FILM COATED ORAL at 08:32

## 2017-12-11 RX ADMIN — DOCUSATE SODIUM -SENNOSIDES 2 TABLET: 50; 8.6 TABLET, COATED ORAL at 18:38

## 2017-12-11 RX ADMIN — POTASSIUM CHLORIDE, DEXTROSE MONOHYDRATE AND SODIUM CHLORIDE 75 ML/HR: 300; 5; 900 INJECTION, SOLUTION INTRAVENOUS at 02:57

## 2017-12-11 NOTE — PROGRESS NOTES
" LOS: 2 days     Name: Bowen Concepcion  Age: 62 y.o.  Sex: female  :  1955  MRN: [id number]         Primary Care Physician: Aubrey Pitts MD    Subjective   Subjective  No new complaints.  Feels better today.  Anxious for discharge.      Objective   Vital Signs  Temp:  [97.6 °F (36.4 °C)-98.4 °F (36.9 °C)] 98.2 °F (36.8 °C)  Heart Rate:  [77-93] 93  Resp:  [16-18] 18  BP: (117-151)/(69-88) 121/69  Body mass index is 43.96 kg/(m^2).    Objective:  General Appearance:  Comfortable and in no acute distress.    Vital signs: (most recent): Blood pressure 121/69, pulse 93, temperature 98.2 °F (36.8 °C), temperature source Oral, resp. rate 18, height 157.5 cm (62.01\"), weight 109 kg (240 lb 6.4 oz), SpO2 97 %, not currently breastfeeding.    Lungs:  Normal respiratory rate and normal effort.    Heart: Normal rate.  Irregular rhythm.    Abdomen: Abdomen is soft.  Bowel sounds are normal.   There is no abdominal tenderness.     Extremities: There is no local swelling or dependent edema.    Neurological: Patient is alert and oriented to person, place and time.    Skin:  Warm and dry.              Results Review:       I reviewed the patient's new clinical results.      Results from last 7 days  Lab Units 17  0748 12/10/17  0512 17  1046   WBC 10*3/mm3 6.39 7.64 9.37   HEMOGLOBIN g/dL 13.7 12.7 15.4   PLATELETS 10*3/mm3 224 235 264       Results from last 7 days  Lab Units 17  0748 12/10/17  0512 17  1046   SODIUM mmol/L 146* 142 141   POTASSIUM mmol/L 4.6 3.8 3.2*   CHLORIDE mmol/L 113* 106 101   CO2 mmol/L 22.3 23.2 22.0   BUN mg/dL 15 12 14   CREATININE mg/dL 0.70 0.65 0.69   CALCIUM mg/dL 8.7 8.5* 9.5   GLUCOSE mg/dL 101* 112* 131*                 Scheduled Meds:     diltiaZEM  mg Oral Q24H   enoxaparin 1 mg/kg Subcutaneous Q12H   famotidine 10 mg Oral BID   metoprolol succinate XL 50 mg Oral Daily   polyethylene glycol 17 g Oral Daily   sennosides-docusate sodium 2 tablet Oral BID "   sennosides-docusate sodium 2 tablet Oral Nightly     PRN Meds:   •  acetaminophen  •  ALPRAZolam  •  aluminum-magnesium hydroxide-simethicone  •  bisacodyl  •  nitroglycerin  •  ondansetron  •  ondansetron **OR** ondansetron ODT **OR** ondansetron  •  Pharmacy to Dose enoxaparin (LOVENOX)  •  zolpidem  Continuous Infusions:    Pharmacy to Dose enoxaparin (LOVENOX)        Assessment/Plan   Active Problems:    Atrial fibrillation with rapid ventricular response    BARBIE (obstructive sleep apnea)    Nausea & vomiting    Hypokalemia    Abnormal weight loss      Assessment & Plan    - Transitioning from diltiazem drip to oral metoprolol, per cardiology.  CHADS-vasc score noted to be 0.  Cardiology recommends once daily aspirin.  She is currently on therapeutic Lovenox but will transition to prophylactic dosing  - She does not appear to be having any acute gallbladder issues.  She will follow-up with GI in the outpatient setting and should she become symptomatic would benefit from general surgery referral  - Urine culture not consistent with active infection.  Stop Rocephin.    Dispo  Anticipate discharge home tomorrow.    Bright Mar MD  [facility]  12/11/17  2:41 PM

## 2017-12-11 NOTE — PROGRESS NOTES
"    Patient Name: Bowen Concepcion  :1955  62 y.o.      Patient Care Team:  Aubrey Pitts MD as PCP - General (Emergency Medicine)  Bright Rodriguez MD as Consulting Physician (Psychiatry)    Chief Complaint: Afib    Interval History: feels better, no tachycardia       Objective   Vital Signs  Temp:  [97.6 °F (36.4 °C)-98.4 °F (36.9 °C)] 97.6 °F (36.4 °C)  Heart Rate:  [] 81  Resp:  [16-18] 16  BP: (117-151)/(62-88) 119/74    Intake/Output Summary (Last 24 hours) at 17 0838  Last data filed at 17 0751   Gross per 24 hour   Intake             1130 ml   Output              600 ml   Net              530 ml     Flowsheet Rows         First Filed Value    Admission Height  157.5 cm (62\") Documented at 2017 1041    Admission Weight  107 kg (235 lb) Documented at 2017 1041          Physical Exam:   General Appearance:    Alert, cooperative, in no acute distress   Lungs:     Clear to auscultation.  Normal respiratory effort and rate.      Heart:    Regular rhythm and normal rate, normal S1 and S2, no murmurs, gallops or rubs.     Chest Wall:    No abnormalities observed   Abdomen:     Soft, nontender, positive bowel sounds.     Extremities:   no cyanosis, clubbing or edema.  No marked joint deformities.  Adequate musculoskeletal strength.       Results Review:      Results from last 7 days  Lab Units 17  0748   SODIUM mmol/L 146*   POTASSIUM mmol/L 4.6   CHLORIDE mmol/L 113*   CO2 mmol/L 22.3   BUN mg/dL 15   CREATININE mg/dL 0.70   GLUCOSE mg/dL 101*   CALCIUM mg/dL 8.7       Results from last 7 days  Lab Units 17  1046   TROPONIN T ng/mL <0.010       Results from last 7 days  Lab Units 17  0748   WBC 10*3/mm3 6.39   HEMOGLOBIN g/dL 13.7   HEMATOCRIT % 42.8   PLATELETS 10*3/mm3 224           Results from last 7 days  Lab Units 17  1046   MAGNESIUM mg/dL 2.0                   Medication Review:     ceftriaxone 1 g Intravenous Q24H   enoxaparin 1 mg/kg " Subcutaneous Q12H   famotidine 10 mg Oral BID   metoprolol tartrate 25 mg Oral Q12H   polyethylene glycol 17 g Oral Daily   sennosides-docusate sodium 2 tablet Oral BID   sennosides-docusate sodium 2 tablet Oral Nightly          dextrose 5% and sodium chloride 0.9% with KCl 40 mEq/L 75 mL/hr Last Rate: 75 mL/hr (12/11/17 0257)   diltiaZEM 5-15 mg/hr Last Rate: 5 mg/hr (12/10/17 0658)   diltiaZEM 5-15 mg/hr Last Rate: 15 mg/hr (12/09/17 1516)   Pharmacy to Dose enoxaparin (LOVENOX)         Assessment/Plan   Active Hospital Problems (** Indicates Principal Problem)    Diagnosis Date Noted   • Atrial fibrillation with rapid ventricular response [I48.91] 12/09/2017   • BARBIE (obstructive sleep apnea) [G47.33] 12/09/2017   • Nausea & vomiting [R11.2] 12/09/2017   • Hypokalemia [E87.6] 12/09/2017   • Abnormal weight loss [R63.4] 12/09/2017      Resolved Hospital Problems    Diagnosis Date Noted Date Resolved   No resolved problems to display.        1.  A. fib with RVR-much improved, will move to oral meds. Diltiazem alone did not control rate- much better with lowest dose beta blockade and no depression/MS issues seen so far. Being eval'd by psych- will defer to them if we need to d/c (but was on inderal prior, so should be OK). Evaluation for weight loss is currently underway.  Echocardiogram demonstrates normal ventricular function.  We're going to have a better handle on her significant weight loss before deciding on long-term anticoagulation.  Left atrium was significantly elevated, suggesting that she's been in atrial fibrillation for some time now, so I would not try to resume normal sinus rhythm at this point.  We will not initiate anti-rhythmic therapy therefore.  2.  Nausea and vomiting-workup underway  3.  Obstructive sleep apnea-  4.  Abnormal weight loss-workup underway    I am going to move to once daily oral meds.  She hopes to go home soon. If no concerns from GI about OAC, would start ASA once daily;  CHADS-Vasc is 0.    Victoriano Murdock III, MD  [facility]  12/11/17  8:38 AM

## 2017-12-11 NOTE — PLAN OF CARE
Problem: Patient Care Overview (Adult)  Goal: Plan of Care Review  Outcome: Ongoing (interventions implemented as appropriate)    12/11/17 0242   Coping/Psychosocial Response Interventions   Plan Of Care Reviewed With patient   Patient Care Overview   Progress improving   Outcome Evaluation   Outcome Summary/Follow up Plan Pt has no complaints of pain. Cardizem gtt @5mg/hr. PO lopressor started. IV fluids. VSS. Continue to monitor. Safety maintained.          Problem: Arrhythmia/Dysrhythmia (Symptomatic) (Adult)  Goal: Signs and Symptoms of Listed Potential Problems Will be Absent or Manageable (Arrhythmia/Dysrhythmia)  Outcome: Ongoing (interventions implemented as appropriate)    Problem: Fall Risk (Adult)  Goal: Identify Related Risk Factors and Signs and Symptoms  Outcome: Outcome(s) achieved Date Met:  12/11/17  Goal: Absence of Falls  Outcome: Ongoing (interventions implemented as appropriate)

## 2017-12-11 NOTE — PROGRESS NOTES
"   LOS: 2 days   Patient Care Team:  Aubrey Pitts MD as PCP - General (Emergency Medicine)  Bright Rodriguez MD as Consulting Physician (Psychiatry)    Chief Complaint: nausea    Subjective     History of Present Illness    Subjective:  Symptoms:  Improved.    Diet:  Adequate intake.    Pain:  She reports no pain.      Patient nausea completely resolved and eating breakfast without difficulty .  Does not want to use the amitriptyline for lessening of nausea.  Has had weight loss.  Denies any abdominal pain with eating   History taken from: patient chart RN    Objective     Vital Signs  Temp:  [97.6 °F (36.4 °C)-98.4 °F (36.9 °C)] 97.6 °F (36.4 °C)  Heart Rate:  [] 93  Resp:  [16-18] 16  BP: (117-151)/(62-88) 151/88    Objective:  General Appearance:  Comfortable.    Vital signs: (most recent): Blood pressure 151/88, pulse 93, temperature 97.6 °F (36.4 °C), temperature source Oral, resp. rate 16, height 157.5 cm (62.01\"), weight 109 kg (240 lb 6.4 oz), SpO2 97 %, not currently breastfeeding.  Vital signs are normal.    Output: Producing urine and producing stool.    Lungs:  Normal effort.    Heart: Normal rate.    Abdomen: Abdomen is soft.  Bowel sounds are normal.   There is no abdominal tenderness.     Neurological: Patient is alert and oriented to person, place and time.    Skin:  Warm and dry.              Results Review:     I reviewed the patient's new clinical results.  I reviewed the patient's new imaging results and agree with the interpretation.    Medication Review: done    Assessment/Plan     Active Problems:    Atrial fibrillation with rapid ventricular response    BARBIE (obstructive sleep apnea)    Nausea & vomiting    Hypokalemia    Abnormal weight loss      Assessment:  (Nausea/anorexia resolved likely multifactorial  Certainly with hydration and rx of afib she is feeling much better    Gallbladder dysfunction  - not symptomatic).     Plan:   (Stable from GI viewpoint  Will stop elavil since " she is markedly improved  I would consider an outpatient surgery consultation for the abnormal gallbladder on hida, do not feel this is an acute issue  Happy for patient to see my practitioner in a few weeks to review symptoms.  Certainly a screening colonoscopy and upper endoscopy for upper tract symptoms is worth considering to exclude serious pathology.  Will follow peripherally).       Bacilio Vivas MD  12/11/17  8:26 AM    Time: Critical care 20 min

## 2017-12-11 NOTE — PROGRESS NOTES
62 y.o.   LOS: 2 days   Patient Care Team:  Aubrey Pitts MD as PCP - General (Emergency Medicine)  Bright Rodriguez MD as Consulting Physician (Psychiatry)    Chief Complaint:  Hyperthyroidism    Chief Complaint   Patient presents with   • Nausea   • Vomiting       Subjective     HPI   Patient's nausea is improved.  She is eating reasonably well  Denies any palpitations  Heart rate is under control  Repeat thyroid function once again suggests low TSH and elevated free T4 strongly suspicious for hyperthyroid state  Thyroid ultrasound had 2 benign nodules which need a follow-up ultrasound in the near future for stability.  There is no comment on vascularity    Interval History:    Patient is a 62-year-old white female admitted to the hospital through the emergency room for symptoms of nausea and anorexia and significant weight loss over the past several months  Patient apparently was following Nutrisystem diet and was noticing nausea and also appetite and sleep problems.  She has been taking Ambien for sleep  She apparently lost nearly 40 pounds in the past 3 months.  Her nausea appears to be worsening and started vomiting yesterday hence she came to the emergency room.  She was noted to be in atrial fibrillation with rapid ventricular response and was started on Cardizem drip and admitted to the hospital  Patient is not treated with amiodarone.  Patient reports that she is on Topamax but denies any prior use of lithium or amiodarone  Patient does report to heat intolerance, sweating and occasional tremors.  Insomnia has been a serious issue for the past few months  Patient is unaware of any thyroid dysfunction in the past  Patient also denies any recent contrast studies prior to admission    Patient denies any difficulty swallowing or change in voice  Patient is evaluated by cardiology and is currently on medication for atrial fibrillation  Further evaluation since admission suggested gallbladder sludge and  stones but apparently not considered to be the etiology for nausea      Review of Systems:      Review of Systems   Constitutional: Positive for fatigue.   Respiratory: Positive for shortness of breath.    Cardiovascular: Positive for palpitations.   Gastrointestinal: Positive for abdominal distention and nausea.   All other systems reviewed and are negative.    Objective     Vital Signs   Temp:  [97.6 °F (36.4 °C)-98.4 °F (36.9 °C)] 98.2 °F (36.8 °C)  Heart Rate:  [77-93] 93  Resp:  [16-18] 18  BP: (117-151)/(69-88) 121/69    Physical Exam:  Physical Exam   Constitutional: She is oriented to person, place, and time.   Eyes: EOM are normal. Pupils are equal, round, and reactive to light.   Neck: Normal range of motion. Neck supple. No thyromegaly present.   Cardiovascular: Normal rate, regular rhythm, normal heart sounds and intact distal pulses.    Pulmonary/Chest: Effort normal and breath sounds normal.   Abdominal: Soft. Bowel sounds are normal.   Musculoskeletal: Normal range of motion. She exhibits no edema.   Neurological: She is alert and oriented to person, place, and time.   Skin: Skin is warm and dry.   Psychiatric: She has a normal mood and affect. Her behavior is normal.   Nursing note and vitals reviewed.  Results Review:     I reviewed the patient's new clinical results.      Glucose   Date/Time Value Ref Range Status   12/11/2017 0748 101 (H) 65 - 99 mg/dL Final   12/10/2017 0512 112 (H) 65 - 99 mg/dL Final   12/09/2017 1046 131 (H) 65 - 99 mg/dL Final     Lab Results (last 72 hours)     Procedure Component Value Units Date/Time    CBC & Differential [[id number]] Collected:  12/09/17 1046    Specimen:  Blood Updated:  12/09/17 1103    Narrative:       The following orders were created for panel order CBC & Differential.  Procedure                               Abnormality         Status                     ---------                               -----------         ------                     CBC Auto  Differential[[id number]]        Abnormal            Final result                 Please view results for these tests on the individual orders.    CBC Auto Differential [[id number]]  (Abnormal) Collected:  12/09/17 1046    Specimen:  Blood Updated:  12/09/17 1103     WBC 9.37 10*3/mm3      RBC 5.64 (H) 10*6/mm3      Hemoglobin 15.4 g/dL      Hematocrit 46.3 (H) %      MCV 82.1 fL      MCH 27.3 pg      MCHC 33.3 g/dL      RDW 15.6 (H) %      RDW-SD 46.7 fl      MPV 9.9 fL      Platelets 264 10*3/mm3      Neutrophil % 75.3 %      Lymphocyte % 17.1 (L) %      Monocyte % 6.9 %      Eosinophil % 0.3 %      Basophil % 0.2 %      Immature Grans % 0.2 %      Neutrophils, Absolute 7.05 10*3/mm3      Lymphocytes, Absolute 1.60 10*3/mm3      Monocytes, Absolute 0.65 10*3/mm3      Eosinophils, Absolute 0.03 10*3/mm3      Basophils, Absolute 0.02 10*3/mm3      Immature Grans, Absolute 0.02 10*3/mm3     Comprehensive Metabolic Panel [[id number]]  (Abnormal) Collected:  12/09/17 1046    Specimen:  Blood Updated:  12/09/17 1114     Glucose 131 (H) mg/dL      BUN 14 mg/dL      Creatinine 0.69 mg/dL      Sodium 141 mmol/L      Potassium 3.2 (L) mmol/L      Chloride 101 mmol/L      CO2 22.0 mmol/L      Calcium 9.5 mg/dL      Total Protein 7.6 g/dL      Albumin 4.20 g/dL      ALT (SGPT) 22 U/L      AST (SGOT) 24 U/L      Alkaline Phosphatase 95 U/L      Total Bilirubin 0.5 mg/dL      eGFR Non African Amer 86 mL/min/1.73      Globulin 3.4 gm/dL      A/G Ratio 1.2 g/dL      BUN/Creatinine Ratio 20.3     Anion Gap 18.0 mmol/L     Lipase [[id number]]  (Normal) Collected:  12/09/17 1046    Specimen:  Blood Updated:  12/09/17 1114     Lipase 15 U/L     Urinalysis With / Culture If Indicated - Urine, Clean Catch [[id number]]  (Abnormal) Collected:  12/09/17 1211    Specimen:  Urine from Urine, Clean Catch Updated:  12/09/17 1250     Color, UA Yellow     Appearance, UA Cloudy (A)     pH, UA 6.0     Specific Gravity, UA 1.015     Glucose, UA Negative      Ketones, UA 80 mg/dL (3+) (A)     Bilirubin, UA Negative     Blood, UA Negative     Protein, UA Trace (A)     Leuk Esterase, UA Large (3+) (A)     Nitrite, UA Negative     Urobilinogen, UA 1.0 E.U./dL    Urinalysis, Microscopic Only - Urine, Clean Catch [[id number]]  (Abnormal) Collected:  12/09/17 1211    Specimen:  Urine from Urine, Clean Catch Updated:  12/09/17 1250     RBC, UA 0-2 /HPF      WBC, UA 21-30 (A) /HPF      Bacteria, UA 1+ (A) /HPF      Squamous Epithelial Cells, UA 0-2 /HPF      Hyaline Casts, UA 7-12 /LPF      Methodology Manual Light Microscopy    Magnesium [[id number]]  (Normal) Collected:  12/09/17 1046    Specimen:  Blood Updated:  12/09/17 1354     Magnesium 2.0 mg/dL     Troponin [[id number]]  (Normal) Collected:  12/09/17 1046    Specimen:  Blood Updated:  12/09/17 1408     Troponin T <0.010 ng/mL     Narrative:       Troponin T Reference Ranges:  Less than 0.03 ng/mL:    Negative for AMI  0.03 to 0.09 ng/mL:      Indeterminant for AMI  Greater than 0.09 ng/mL: Positive for AMI    BNP [[id number]]  (Abnormal) Collected:  12/09/17 1046    Specimen:  Blood Updated:  12/09/17 1408     proBNP 1058.0 (H) pg/mL     Narrative:       Among patients with dyspnea, NT-proBNP is highly sensitive for the detection of acute congestive heart failure. In addition NT-proBNP of <300 pg/ml effectively rules out acute congestive heart failure with 99% negative predictive value.    TSH [[id number]]  (Normal) Collected:  12/09/17 1046    Specimen:  Blood Updated:  12/09/17 1408     TSH 0.462 mIU/mL     T4, Free [[id number]]  (Abnormal) Collected:  12/09/17 1046    Specimen:  Blood Updated:  12/09/17 1408     Free T4 2.52 (H) ng/dL     Basic Metabolic Panel [[id number]]  (Abnormal) Collected:  12/10/17 0512    Specimen:  Blood Updated:  12/10/17 0604     Glucose 112 (H) mg/dL      BUN 12 mg/dL      Creatinine 0.65 mg/dL      Sodium 142 mmol/L      Potassium 3.8 mmol/L      Chloride 106 mmol/L      CO2 23.2 mmol/L       Calcium 8.5 (L) mg/dL      eGFR Non African Amer 92 mL/min/1.73      BUN/Creatinine Ratio 18.5     Anion Gap 12.8 mmol/L     Narrative:       GFR Normal >60  Chronic Kidney Disease <60  Kidney Failure <15    CBC Auto Differential [[id number]]  (Abnormal) Collected:  12/10/17 0512    Specimen:  Blood Updated:  12/10/17 0611     WBC 7.64 10*3/mm3      RBC 4.77 10*6/mm3      Hemoglobin 12.7 g/dL      Hematocrit 40.1 %      MCV 84.1 fL      MCH 26.6 (L) pg      MCHC 31.7 (L) g/dL      RDW 16.0 (H) %      RDW-SD 48.7 fl      MPV 9.8 fL      Platelets 235 10*3/mm3      Neutrophil % 51.0 %      Lymphocyte % 37.2 %      Monocyte % 10.2 %      Eosinophil % 1.2 %      Basophil % 0.4 %      Immature Grans % 0.0 %      Neutrophils, Absolute 3.90 10*3/mm3      Lymphocytes, Absolute 2.84 10*3/mm3      Monocytes, Absolute 0.78 10*3/mm3      Eosinophils, Absolute 0.09 10*3/mm3      Basophils, Absolute 0.03 10*3/mm3      Immature Grans, Absolute 0.00 10*3/mm3     Urine Culture - Urine, Urine, Clean Catch [[id number]] Collected:  12/09/17 1211    Specimen:  Urine from Urine, Clean Catch Updated:  12/11/17 0804     Urine Culture --      25,000 CFU/mL Mixed Alejandro Isolated    Narrative:         Specimen contains mixed organisms of questionable pathogenicity which indicates contamination with commensal alejandro.  Further identification is unlikely to provide clinically useful information.  Suggest recollection.    CBC & Differential [[id number]] Collected:  12/11/17 0748    Specimen:  Blood Updated:  12/11/17 0805    Narrative:       The following orders were created for panel order CBC & Differential.  Procedure                               Abnormality         Status                     ---------                               -----------         ------                     CBC Auto Differential[[id number]]        Abnormal            Final result                 Please view results for these tests on the individual orders.    CBC Auto  Differential [[id number]]  (Abnormal) Collected:  12/11/17 0748    Specimen:  Blood Updated:  12/11/17 0805     WBC 6.39 10*3/mm3      RBC 5.06 10*6/mm3      Hemoglobin 13.7 g/dL      Hematocrit 42.8 %      MCV 84.6 fL      MCH 27.1 pg      MCHC 32.0 (L) g/dL      RDW 16.7 (H) %      RDW-SD 51.5 fl      MPV 9.7 fL      Platelets 224 10*3/mm3      Neutrophil % 48.9 %      Lymphocyte % 38.8 %      Monocyte % 9.9 %      Eosinophil % 1.9 %      Basophil % 0.5 %      Immature Grans % 0.0 %      Neutrophils, Absolute 3.13 10*3/mm3      Lymphocytes, Absolute 2.48 10*3/mm3      Monocytes, Absolute 0.63 10*3/mm3      Eosinophils, Absolute 0.12 10*3/mm3      Basophils, Absolute 0.03 10*3/mm3      Immature Grans, Absolute 0.00 10*3/mm3     Comprehensive Metabolic Panel [[id number]]  (Abnormal) Collected:  12/11/17 0748    Specimen:  Blood Updated:  12/11/17 0825     Glucose 101 (H) mg/dL      BUN 15 mg/dL      Creatinine 0.70 mg/dL      Sodium 146 (H) mmol/L      Potassium 4.6 mmol/L      Chloride 113 (H) mmol/L      CO2 22.3 mmol/L      Calcium 8.7 mg/dL      Total Protein 6.3 g/dL      Albumin 3.60 g/dL      ALT (SGPT) 39 (H) U/L      AST (SGOT) 26 U/L      Alkaline Phosphatase 79 U/L      Total Bilirubin 0.4 mg/dL      eGFR Non African Amer 85 mL/min/1.73      Globulin 2.7 gm/dL      A/G Ratio 1.3 g/dL      BUN/Creatinine Ratio 21.4     Anion Gap 10.7 mmol/L     Lipase [[id number]]  (Normal) Collected:  12/11/17 0748    Specimen:  Blood Updated:  12/11/17 0825     Lipase 28 U/L     TSH [[id number]]  (Normal) Collected:  12/11/17 0748    Specimen:  Blood Updated:  12/11/17 0837     TSH 0.579 mIU/mL     T4, Free [[id number]]  (Abnormal) Collected:  12/11/17 0748    Specimen:  Blood Updated:  12/11/17 0837     Free T4 2.03 (H) ng/dL     Cortisol [[id number]]  (Normal) Collected:  12/11/17 0748    Specimen:  Blood Updated:  12/11/17 0837     Cortisol 22.72 mcg/dL     Narrative:       Cortisol Reference Ranges:    Cortisol 6AM - 10AM  Range: 6.02-18.40 mcg/dl  Cortisol 4PM - 8PM Range: 2.68-10.50 mcg/dl  Critical AM/PM:    Less than 2 mcg/dl        Imaging Results (last 72 hours)     Procedure Component Value Units Date/Time    CT Abdomen Pelvis With Contrast [[id number]] Collected:  12/09/17 2343     Updated:  12/09/17 2348    Narrative:       CT SCANS ABDOMEN AND PELVIS WITH IV CONTRAST.     HISTORY: abd pain; vomiting; I48.91-Unspecified atrial fibrillation     COMPARISON: None.     TECHNIQUE: Radiation dose reduction techniques were utilized, including  automated exposure control and exposure modulation based on body size.  Axial images were obtained from the lung bases to the symphysis pubis  with IV contrast only.. Oral contrast was not administered per request.     FINDINGS ABDOMEN CT:  Multiple gallstones are present without  gallbladder wall thickening or pericholecystic inflammation. The  remaining solid organs have an unremarkable appearance. The encompassed  aorta is without evidence of aneurysmal dilatation. No free fluid. The  appendix is not identified with certainty on this exam without oral  contrast.     FINDINGS PELVIS CT:  There are multiple uterine masses, some of which  are calcified, likely related to leiomyomas. There are scattered colonic  diverticuli. The GI tract was not opacified for assessment but is non  obstructive in appearance. There is abundant fecal material in the colon  possibly related to constipation. There is a small fat containing  umbilical hernia without associated inflammation.                Impression:       IMPRESSION :   1. Uncomplicated cholelithiasis.  2. Constipation, colonic diverticulosis.  3. Multiple uterine masses 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.        This report was finalized on 12/9/2017 11:45 PM by Dennis Quiñonez MD.       US Gallbladder [[id number]] Collected:  12/10/17 0723     Updated:  12/10/17 0728    Narrative:       US GALLBLADDER-        INDICATIONS: Nausea     TECHNIQUE: Ultrasound of the  right upper quadrant     COMPARISON: Correlated with CT from 12/09/2017     FINDINGS:     The gallbladder is nontender, but contains sludge and stones. No  gallbladder wall thickening or pericholecystic fluid.     No intrahepatic or extrahepatic biliary ductal dilatation is  demonstrated. The common biliary duct caliber is measured at 3.9 mm.     No liver lesion is identified. Diffusely, the echogenicity of the liver  is otherwise in the range of normal relative to that of the right  kidney.     The pancreas is partly obscured. The right kidney, 11.5 cm, and the  pancreas otherwise appear unremarkable.                Impression:          Sludge and stones in the gallbladder. No evidence for acute  cholecystitis.             This report was finalized on 12/10/2017 7:25 AM by Dr. Wilson Bolaños MD.       NM HIDA Scan With Pharmacological Intervention [[id number]] Collected:  12/10/17 0945     Updated:  12/10/17 0953    Narrative:       NM HIDA SCAN WITH PHARMACOLOGICAL INTERVENTION-        INDICATIONS: Nausea      TECHNIQUE: HEPATOBILIARY SCINTIGRAPHY     COMPARISON: None available. Correlated with the ultrasound from  12/10/2017, CT from 12/9/2017.     FINDINGS:      Radiotracer: 6 mCi technetium 99m Choletec, intravenous.     Anterior projections of the abdomen were obtained following radiotracer  administration. There is prompt visualization of the gallbladder  (although faintly), common biliary duct, and small bowel.     At 60 minutes, 2.1 mcg of cholecystokinin was administered  intravenously, with no reported symptomatology. Images were obtained at  1 minute intervals for 30 minutes, revealing no obvious decrease in  gallbladder activity. Calculated gallbladder ejection fraction was 1%  (normal is greater than 35%).             Impression:             1. Prompt visualization of the gallbladder, which will exclude acute  cholecystitis in 95% of cases.  2. The gallbladder ejection fraction is decreased,  consistent with  biliary dyskinesia.     This report was finalized on 12/10/2017 9:50 AM by Dr. Wilson Bolaños MD.       US Thyroid [[id number]] Collected:  12/10/17 2133     Updated:  12/11/17 0432    Narrative:       THYROID ULTRASOUND     HISTORY: Hyperthyroidism; I48.91-Unspecified atrial fibrillation.     COMPARISON: None.     FINDINGS: Longitudinal and transverse images of the thyroid gland were  obtained. The right thyroid lobe measures 5 x 1.7 x 1.4 centimeters. The  left thyroid lobe measures 4.8 x 1.6 x 1.2 centimeters. There is a tiny  2 mm cystic lesion located fairly anteriorly in the midportion of the  left thyroid lobe. There are 2 solid nodules on the right side. The  larger measures 11 mm. The smaller measures 7 mm. They are fairly  uniform in their appearance and exhibit benign sonographic imaging  features. No suspicious mass or nodule. The remainder of the thyroid  parenchyma is unremarkable.       Impression:       2 adjacent benign-appearing nodules in the right thyroid lobe. Suggest 1  year follow-up.     This report was finalized on 12/11/2017 4:29 AM by Dennis Quiñonez MD.             Medication Review:       Current Facility-Administered Medications:   •  acetaminophen (TYLENOL) tablet 650 mg, 650 mg, Oral, Q4H PRN, Ana Beauchamp MD  •  ALPRAZolam (XANAX) tablet 0.25 mg, 0.25 mg, Oral, Daily PRN, Ana Beauchamp MD  •  aluminum-magnesium hydroxide-simethicone (MAALOX MAX) 400-400-40 MG/5ML suspension 15 mL, 15 mL, Oral, Q6H PRN, Ana Beauchamp MD  •  bisacodyl (DULCOLAX) EC tablet 5 mg, 5 mg, Oral, Daily PRN, Ana Beauchamp MD  •  diltiaZEM CD (CARDIZEM CD) 24 hr capsule 120 mg, 120 mg, Oral, Q24H, Victoriano Murdock III, MD, 120 mg at 12/11/17 1200  •  [START ON 12/12/2017] enoxaparin (LOVENOX) syringe 40 mg, 40 mg, Subcutaneous, Q24H, Bright Mar MD  •  famotidine (PEPCID) tablet 10 mg, 10 mg, Oral, BID, Ana Beauchamp MD, 10 mg at 12/11/17  0832  •  methIMAzole (TAPAZOLE) tablet 10 mg, 10 mg, Oral, Daily, Roman BARBA MD  •  metoprolol succinate XL (TOPROL-XL) 24 hr tablet 50 mg, 50 mg, Oral, Daily, Victoriano Murdock III, MD  •  nitroglycerin (NITROSTAT) SL tablet 0.4 mg, 0.4 mg, Sublingual, Q5 Min PRN, Ana Beauchamp MD  •  ondansetron (ZOFRAN) injection 4 mg, 4 mg, Intravenous, Q6H PRN, Ana Beauchamp MD  •  ondansetron (ZOFRAN) tablet 4 mg, 4 mg, Oral, Q6H PRN **OR** ondansetron ODT (ZOFRAN-ODT) disintegrating tablet 4 mg, 4 mg, Oral, Q6H PRN **OR** ondansetron (ZOFRAN) injection 4 mg, 4 mg, Intravenous, Q6H PRN, Ana Beauchamp MD, 4 mg at 12/09/17 6156  •  Pharmacy to Dose enoxaparin (LOVENOX), , Does not apply, Continuous PRN, Ana Beauchamp MD  •  polyethylene glycol 3350 powder (packet), 17 g, Oral, Daily, Vlad Cuellar MD  •  sennosides-docusate sodium (SENOKOT-S) 8.6-50 MG tablet 2 tablet, 2 tablet, Oral, BID, Ana Beauchamp MD, 2 tablet at 12/11/17 0832  •  sennosides-docusate sodium (SENOKOT-S) 8.6-50 MG tablet 2 tablet, 2 tablet, Oral, Nightly, Vlad Cuellar MD  •  zolpidem (AMBIEN) tablet 5 mg, 5 mg, Oral, Nightly PRN, Ana Beauchamp MD, 5 mg at 12/10/17 5591    Assessment/Plan     Patient Active Problem List   Diagnosis   • Bipolar I disorder, single manic episode   • Acid reflux   • HLD (hyperlipidemia)   • LBP (low back pain)   • Atrial fibrillation with rapid ventricular response   • BARBIE (obstructive sleep apnea)   • Nausea & vomiting   • Hypokalemia   • Abnormal weight loss     The current thyroid evaluation suggested TSH is low at 0.4 and free T4 is elevated at 2.5  Patient denies any prior knowledge  Patient has few symptoms but is not classic  She did have a 40 pound weight loss which was rather unexpected for couple months of Nutrisystem diet  Patient denies having taken any weight loss medication including over-the-counter medication     I discussed further evaluation and management of  "hyperthyroidism   Will recheck TSH and free T4  If confirmed to be hyperthyroid I recommend that she might go on medication such as Tapazole or PTU for the time being to bring her into euthyroid range  Side effects of the medication reviewed in detail with the patient  Once confirmed further evaluation will include a thyroid ultrasound as well as radioactive iodine uptake and scan.  Patient apparently had CT scan with contrast in the emergency room so will have to wait at least 2-3 months before attempting radioiodine uptake and scan     Thyroid ultrasound images reviewed personally  2 nodules of 11 mm in 7 mm noted in the right lobe with no specific nodules and characteristics  She also appears to have a small cyst in the left upper lobe  No evidence for increased vascularity    I will check TSH TPO today  Majority of the symptoms, weight loss, low TSH with elevated free T4 on the repeat evaluation suggest clinical hyperthyroid state.  Etiology is still unknown  I discussed the use of methimazole 10 mg 1 tablet daily  Side effects again reviewed with the patient  Patient verbalized understanding  Will start the medication today and monitor    The total time spent for old record and lab review and floor time was more than 35 min of which greater than 20 min of time was spent on counseling the patient on recommended evaluation and treatment options, instructions for management/treatment and /or follow up  and importance of compliance with chosen management or treatment options       Roman Pop MD FACE.  12/11/17  5:46 PM      EMR Dragon / transcription disclaimer:     \"Dictated utilizing Dragon dictation\".   "

## 2017-12-12 VITALS
BODY MASS INDEX: 44.2 KG/M2 | RESPIRATION RATE: 18 BRPM | OXYGEN SATURATION: 97 % | HEART RATE: 99 BPM | TEMPERATURE: 97.6 F | SYSTOLIC BLOOD PRESSURE: 143 MMHG | HEIGHT: 62 IN | DIASTOLIC BLOOD PRESSURE: 78 MMHG | WEIGHT: 240.2 LBS

## 2017-12-12 PROBLEM — E05.90 HYPERTHYROIDISM: Status: ACTIVE | Noted: 2017-12-12

## 2017-12-12 LAB
ANION GAP SERPL CALCULATED.3IONS-SCNC: 11.4 MMOL/L
BASOPHILS # BLD AUTO: 0.02 10*3/MM3 (ref 0–0.2)
BASOPHILS NFR BLD AUTO: 0.3 % (ref 0–1.5)
BUN BLD-MCNC: 10 MG/DL (ref 8–23)
BUN/CREAT SERPL: 15.2 (ref 7–25)
CALCIUM SPEC-SCNC: 8.7 MG/DL (ref 8.6–10.5)
CHLORIDE SERPL-SCNC: 108 MMOL/L (ref 98–107)
CO2 SERPL-SCNC: 24.6 MMOL/L (ref 22–29)
CREAT BLD-MCNC: 0.66 MG/DL (ref 0.57–1)
DEPRECATED RDW RBC AUTO: 51.1 FL (ref 37–54)
EOSINOPHIL # BLD AUTO: 0.18 10*3/MM3 (ref 0–0.7)
EOSINOPHIL NFR BLD AUTO: 2.7 % (ref 0.3–6.2)
ERYTHROCYTE [DISTWIDTH] IN BLOOD BY AUTOMATED COUNT: 16.2 % (ref 11.7–13)
GFR SERPL CREATININE-BSD FRML MDRD: 90 ML/MIN/1.73
GLUCOSE BLD-MCNC: 88 MG/DL (ref 65–99)
HCT VFR BLD AUTO: 43.2 % (ref 35.6–45.5)
HGB BLD-MCNC: 13.4 G/DL (ref 11.9–15.5)
IMM GRANULOCYTES # BLD: 0 10*3/MM3 (ref 0–0.03)
IMM GRANULOCYTES NFR BLD: 0 % (ref 0–0.5)
LYMPHOCYTES # BLD AUTO: 2.29 10*3/MM3 (ref 0.9–4.8)
LYMPHOCYTES NFR BLD AUTO: 33.8 % (ref 19.6–45.3)
MCH RBC QN AUTO: 26.7 PG (ref 26.9–32)
MCHC RBC AUTO-ENTMCNC: 31 G/DL (ref 32.4–36.3)
MCV RBC AUTO: 86.1 FL (ref 80.5–98.2)
MONOCYTES # BLD AUTO: 0.58 10*3/MM3 (ref 0.2–1.2)
MONOCYTES NFR BLD AUTO: 8.6 % (ref 5–12)
NEUTROPHILS # BLD AUTO: 3.71 10*3/MM3 (ref 1.9–8.1)
NEUTROPHILS NFR BLD AUTO: 54.6 % (ref 42.7–76)
PLATELET # BLD AUTO: 198 10*3/MM3 (ref 140–500)
PMV BLD AUTO: 10.1 FL (ref 6–12)
POTASSIUM BLD-SCNC: 5 MMOL/L (ref 3.5–5.2)
RBC # BLD AUTO: 5.02 10*6/MM3 (ref 3.9–5.2)
SODIUM BLD-SCNC: 144 MMOL/L (ref 136–145)
WBC NRBC COR # BLD: 6.78 10*3/MM3 (ref 4.5–10.7)

## 2017-12-12 PROCEDURE — 25010000002 ENOXAPARIN PER 10 MG: Performed by: INTERNAL MEDICINE

## 2017-12-12 PROCEDURE — 80048 BASIC METABOLIC PNL TOTAL CA: CPT | Performed by: INTERNAL MEDICINE

## 2017-12-12 PROCEDURE — 86376 MICROSOMAL ANTIBODY EACH: CPT | Performed by: INTERNAL MEDICINE

## 2017-12-12 PROCEDURE — 84445 ASSAY OF TSI GLOBULIN: CPT | Performed by: INTERNAL MEDICINE

## 2017-12-12 PROCEDURE — 85025 COMPLETE CBC W/AUTO DIFF WBC: CPT | Performed by: INTERNAL MEDICINE

## 2017-12-12 RX ORDER — METOPROLOL SUCCINATE 50 MG/1
50 TABLET, EXTENDED RELEASE ORAL DAILY
Qty: 30 TABLET | Refills: 0 | Status: SHIPPED | OUTPATIENT
Start: 2017-12-13 | End: 2017-12-18 | Stop reason: SDUPTHER

## 2017-12-12 RX ORDER — DILTIAZEM HYDROCHLORIDE 120 MG/1
120 CAPSULE, COATED, EXTENDED RELEASE ORAL
Qty: 30 CAPSULE | Refills: 0 | Status: SHIPPED | OUTPATIENT
Start: 2017-12-13 | End: 2017-12-18 | Stop reason: SDUPTHER

## 2017-12-12 RX ORDER — METHIMAZOLE 10 MG/1
10 TABLET ORAL DAILY
Qty: 30 TABLET | Refills: 0 | Status: SHIPPED | OUTPATIENT
Start: 2017-12-13 | End: 2018-01-09 | Stop reason: SDUPTHER

## 2017-12-12 RX ADMIN — METHIMAZOLE 10 MG: 10 TABLET ORAL at 09:24

## 2017-12-12 RX ADMIN — FAMOTIDINE 10 MG: 10 TABLET, FILM COATED ORAL at 08:53

## 2017-12-12 RX ADMIN — ENOXAPARIN SODIUM 40 MG: 40 INJECTION SUBCUTANEOUS at 06:22

## 2017-12-12 RX ADMIN — DOCUSATE SODIUM -SENNOSIDES 2 TABLET: 50; 8.6 TABLET, COATED ORAL at 08:52

## 2017-12-12 RX ADMIN — DILTIAZEM HYDROCHLORIDE 120 MG: 120 CAPSULE, COATED, EXTENDED RELEASE ORAL at 08:53

## 2017-12-12 RX ADMIN — FAMOTIDINE 10 MG: 10 TABLET, FILM COATED ORAL at 17:00

## 2017-12-12 RX ADMIN — DOCUSATE SODIUM -SENNOSIDES 2 TABLET: 50; 8.6 TABLET, COATED ORAL at 17:00

## 2017-12-12 RX ADMIN — METOPROLOL SUCCINATE 50 MG: 50 TABLET, FILM COATED, EXTENDED RELEASE ORAL at 08:55

## 2017-12-12 NOTE — DISCHARGE SUMMARY
Date of Admission: 12/9/2017  Date of Discharge:  12/12/2017  Primary Care Physician: Aubrey Pitts MD     Discharge Diagnosis:  Principal Problem:    Atrial fibrillation with rapid ventricular response  Active Problems:    Hyperthyroidism    BARBIE (obstructive sleep apnea)    Nausea & vomiting    Hypokalemia    Abnormal weight loss      DETAILS OF HOSPITAL STAY     Pertinent Test Results and Procedures Performed    CT scan of the abdomen and pelvis:  1. Uncomplicated cholelithiasis.  2. Constipation, colonic diverticulosis.  3. Multiple uterine masses 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.    Right upper quadrant ultrasound showed sludge and stones in the gallbladder but no evidence of acute cholecystitis    HIDA scan:  1. Prompt visualization of the gallbladder, which will exclude acute  cholecystitis in 95% of cases.  2. The gallbladder ejection fraction is decreased, consistent with  biliary dyskinesia.    Thyroid ultrasound:  2 adjacent benign-appearing nodules in the right thyroid lobe. Suggest 1  year follow-up.    Transthoracic echocardiogram:  · Left ventricular systolic function is normal. Calculated EF = 68.4%. Estimated EF was in agreement with the calculated EF. Estimated EF = 68%. Normal left ventricular cavity size and wall thickness noted. All left ventricular wall segments contract normally. Left ventricular diastolic function was indeterminate. Elevated left atrial pressure.  · Left atrial volume is severely increased.  · Right atrial cavity size is moderate-to-severely dilated.  · Mild mitral valve regurgitation is present.  · Mild tricuspid valve regurgitation is present. Estimated right ventricular systolic pressure from tricuspid regurgitation is mildly elevated (35-45 mmHg). Calculated right ventricular systolic pressure from tricuspid regurgitation is 38.9 mmHg.    Hospital Course    This is a 62-year-old female who presented to the emergency room with complaints of nausea, vomiting, and anorexia ongoing for  a couple of months prior to presentation.  Please see H&P for full details of admission.  She also endorsed 40 pound weight loss since September of this year.  She came to the emergency room and was found to be in atrial fibrillation with rapid ventricular response and was started on a diltiazem drip.  She was evaluated by cardiology who transitioned her fairly quickly to oral medications including oral diltiazem and Toprol-XL.  Her heart rate is now controlled off of the drip and cardiology recommended she stay on a daily aspirin.  They do not think she needs any other forms of anticoagulation given her CHADS-Vasc score of 0.  She had echocardiogram as described above.  Cardiology has cleared for discharge and will see her in the office in follow-up.  The patient also underwent investigation of her nausea, vomiting, and weight loss.  She had CT scan, right upper quadrant ultrasound, and HIDA scan as described above.  She was evaluated by gastroenterology.  While the patient does have gallstones, she does not have any evidence of obstruction or acute cholecystitis.  GI did not recommend any acute intervention.  We would recommend outpatient surgery consultation for the abnormal HIDA scan.  Additionally, screening colonoscopy and upper endoscopy would be worth considering in the outpatient setting but given that her symptoms are now improved GI did not feel this was necessary to perform in the inpatient setting.  Thyroid function testing indicated a new finding of hyperthyroidism.  This along with her atrial fibrillation, weight loss, and other presenting symptoms we did ask endocrinology to evaluate the patient.  She has been started on methimazole.  Thyroid peroxidase antibody and thyroid stimulating immunoglobulin testing is currently pending.  The patient will be arranged for outpatient endocrinology follow-up.  At this point she is medically stable.  She very much desires to be discharged.    Physical Exam at  Discharge:  General: No acute distress, AAOx3  HEENT: EOMI, PERRL  Cardiovascular: +s1 and s2, RRR  Lungs: No rhonchi or wheezing  Abdomen: soft, nontender    Consults:   Consults     Date and Time Order Name Status Description    12/10/2017 1235 Inpatient Consult to Endocrinology Completed     12/10/2017 0935 Inpatient Consult to Gastroenterology Completed     12/10/2017 0317 Inpatient Consult to Cardiology Completed     12/9/2017 7635 [facility] (on-call MD unless specified) Completed             Condition on Discharge: Stable    Discharge Disposition  Home or Self Care    Discharge Medications   Bowen Concepcion   Home Medication Instructions LISA:[id number]    Printed on:12/12/17 1104   Medication Information                      ALPRAZolam (XANAX) 0.25 MG tablet  Take 0.25 mg by mouth Daily As Needed for Anxiety.             Armodafinil 250 MG tablet  Take 125 mg by mouth Daily. BEEN TAKING 1/2 OF WHAT IS PRESCRIBED             aspirin 81 MG tablet  Take 1 tablet by mouth Daily.             Cholecalciferol (VITAMIN D) 2000 units capsule  Take 2,000 Units by mouth Daily.             diltiaZEM CD (CARDIZEM CD) 120 MG 24 hr capsule  Take 1 capsule by mouth Daily for 30 days.             famotidine (PEPCID) 10 MG tablet  Take 10 mg by mouth 2 (Two) Times a Day.             methIMAzole (TAPAZOLE) 10 MG tablet  Take 1 tablet by mouth Daily for 30 days.             metoprolol succinate XL (TOPROL-XL) 50 MG 24 hr tablet  Take 1 tablet by mouth Daily for 30 days.             naproxen (NAPROSYN) 500 MG tablet  Take 500 mg by mouth Every 12 (Twelve) Hours As Needed. FOR KNEE PAIN             topiramate (TOPAMAX) 100 MG tablet  Take 100 mg by mouth 2 (Two) Times a Day.             zolpidem CR (AMBIEN CR) 12.5 MG CR tablet  Take 12.5 mg by mouth At Night As Needed for Sleep.                 Discharge Diet:   Diet Instructions     Diet: Regular       Discharge Diet:  Regular                 Activity at Discharge:   Activity  Instructions     Activity as Tolerated                     Follow-up Appointments  Future Appointments  Date Time Provider Department Center   12/18/2017 8:30 AM [facility] None   12/20/2017 10:20 AM Aubrey Pitts MD [facility] None     Additional Instructions for the Follow-ups that You Need to Schedule     Discharge Follow-up with PCP    As directed    Follow Up Details:  1 week           Discharge Follow-up with Specified Provider: Dr. Murdock    As directed    To:  Dr. Murdock    Follow Up Details:  per his recomendations           Discharge Follow-up with Specified Provider: Dr. Rosales    As directed    To:  Dr. Rosales    Follow Up Details:  per his recomendations                     Test Results Pending at Discharge   Order Current Status    Thyroid Peroxidase Antibody In process    Thyroid Stimulating Immunoglobulin In process          I have examined and discussed discharge planning with the patient today.     Bright Mar MD  12/12/17  11:04 AM    Time: Discharge greater than 30 min

## 2017-12-12 NOTE — PLAN OF CARE
Problem: Patient Care Overview (Adult)  Goal: Plan of Care Review  Outcome: Ongoing (interventions implemented as appropriate)    12/11/17 2023   Coping/Psychosocial Response Interventions   Plan Of Care Reviewed With patient   Patient Care Overview   Progress improving   Outcome Evaluation   Outcome Summary/Follow up Plan Pt started on po Cardizem. Lopressor changed to Toprol XL. HR controlled with beta blockers. Denies any chest pain.  here to see pt,orders noted.       Goal: Adult Individualization and Mutuality  Outcome: Ongoing (interventions implemented as appropriate)  Goal: Discharge Needs Assessment  Outcome: Ongoing (interventions implemented as appropriate)    Problem: Arrhythmia/Dysrhythmia (Symptomatic) (Adult)  Goal: Signs and Symptoms of Listed Potential Problems Will be Absent or Manageable (Arrhythmia/Dysrhythmia)  Outcome: Ongoing (interventions implemented as appropriate)    Problem: Fall Risk (Adult)  Goal: Absence of Falls  Outcome: Ongoing (interventions implemented as appropriate)

## 2017-12-12 NOTE — PLAN OF CARE
Problem: Patient Care Overview (Adult)  Goal: Plan of Care Review  Outcome: Ongoing (interventions implemented as appropriate)    12/12/17 1225   Coping/Psychosocial Response Interventions   Plan Of Care Reviewed With patient   Patient Care Overview   Progress improving   Outcome Evaluation   Outcome Summary/Follow up Plan VSS. Tolerating new medicine. No c/o pain. Safety maintained, continue to monitor. Discharge to home this afternoon.       Goal: Adult Individualization and Mutuality  Outcome: Ongoing (interventions implemented as appropriate)  Goal: Discharge Needs Assessment  Outcome: Ongoing (interventions implemented as appropriate)    Problem: Arrhythmia/Dysrhythmia (Symptomatic) (Adult)  Goal: Signs and Symptoms of Listed Potential Problems Will be Absent or Manageable (Arrhythmia/Dysrhythmia)  Outcome: Ongoing (interventions implemented as appropriate)    Problem: Fall Risk (Adult)  Goal: Absence of Falls  Outcome: Ongoing (interventions implemented as appropriate)

## 2017-12-12 NOTE — PLAN OF CARE
Problem: Patient Care Overview (Adult)  Goal: Plan of Care Review  Outcome: Ongoing (interventions implemented as appropriate)    12/12/17 0329   Coping/Psychosocial Response Interventions   Plan Of Care Reviewed With patient   Patient Care Overview   Progress progress towards functional goals is fair   Outcome Evaluation   Outcome Summary/Follow up Plan PT TOLERATING PO MEDS, NO C/O PAIN OR OTHER DISTRESS, CONTINUE TO MONITOR       Goal: Adult Individualization and Mutuality  Outcome: Ongoing (interventions implemented as appropriate)  Goal: Discharge Needs Assessment  Outcome: Ongoing (interventions implemented as appropriate)    Problem: Arrhythmia/Dysrhythmia (Symptomatic) (Adult)  Goal: Signs and Symptoms of Listed Potential Problems Will be Absent or Manageable (Arrhythmia/Dysrhythmia)  Outcome: Ongoing (interventions implemented as appropriate)    Problem: Fall Risk (Adult)  Goal: Absence of Falls  Outcome: Ongoing (interventions implemented as appropriate)

## 2017-12-13 ENCOUNTER — TELEPHONE (OUTPATIENT)
Dept: INTERNAL MEDICINE | Facility: CLINIC | Age: 62
End: 2017-12-13

## 2017-12-13 LAB — THYROPEROXIDASE AB SERPL-ACNC: 9 IU/ML (ref 0–34)

## 2017-12-13 NOTE — TELEPHONE ENCOUNTER
----- Message from Aubrey Pitts MD sent at 12/13/2017  2:03 PM EST -----  The labs were reviewed. Please inform patient that labs were normal.

## 2017-12-13 NOTE — PROGRESS NOTES
Case Management Discharge Note    Final Note: dc home 12/12    Discharge Placement     No information found        Other:  (private)    Discharge Codes: 01  Discharge to home

## 2017-12-14 ENCOUNTER — TELEPHONE (OUTPATIENT)
Dept: CARDIOLOGY | Facility: CLINIC | Age: 62
End: 2017-12-14

## 2017-12-14 NOTE — TELEPHONE ENCOUNTER
Pt was in the hospital on 12/9/17. When would you like the patient to come into the office to follow up from the stay? Please Advise    Thanks  Viir

## 2017-12-18 ENCOUNTER — RESULTS ENCOUNTER (OUTPATIENT)
Dept: INTERNAL MEDICINE | Facility: CLINIC | Age: 62
End: 2017-12-18

## 2017-12-18 DIAGNOSIS — E87.0 SERUM SODIUM ELEVATED: ICD-10-CM

## 2017-12-18 RX ORDER — METOPROLOL SUCCINATE 50 MG/1
50 TABLET, EXTENDED RELEASE ORAL DAILY
Qty: 30 TABLET | Refills: 0 | Status: SHIPPED | OUTPATIENT
Start: 2017-12-18 | End: 2018-01-15 | Stop reason: SDUPTHER

## 2017-12-18 RX ORDER — DILTIAZEM HYDROCHLORIDE 120 MG/1
120 CAPSULE, COATED, EXTENDED RELEASE ORAL
Qty: 30 CAPSULE | Refills: 0 | Status: SHIPPED | OUTPATIENT
Start: 2017-12-18 | End: 2018-01-15 | Stop reason: SDUPTHER

## 2017-12-20 ENCOUNTER — OFFICE VISIT (OUTPATIENT)
Dept: INTERNAL MEDICINE | Facility: CLINIC | Age: 62
End: 2017-12-20

## 2017-12-20 VITALS
OXYGEN SATURATION: 97 % | HEART RATE: 89 BPM | WEIGHT: 230.3 LBS | BODY MASS INDEX: 42.38 KG/M2 | DIASTOLIC BLOOD PRESSURE: 72 MMHG | SYSTOLIC BLOOD PRESSURE: 118 MMHG | HEIGHT: 62 IN

## 2017-12-20 DIAGNOSIS — R63.4 ABNORMAL WEIGHT LOSS: ICD-10-CM

## 2017-12-20 DIAGNOSIS — I48.91 ATRIAL FIBRILLATION WITH RAPID VENTRICULAR RESPONSE (HCC): ICD-10-CM

## 2017-12-20 DIAGNOSIS — H53.9 VISUAL CHANGES: ICD-10-CM

## 2017-12-20 DIAGNOSIS — E05.90 HYPERTHYROIDISM: Primary | ICD-10-CM

## 2017-12-20 PROCEDURE — 99214 OFFICE O/P EST MOD 30 MIN: CPT | Performed by: FAMILY MEDICINE

## 2017-12-20 RX ORDER — FAMOTIDINE 10 MG
10 TABLET ORAL 2 TIMES DAILY PRN
Qty: 60 TABLET | Refills: 3 | Status: SHIPPED | OUTPATIENT
Start: 2017-12-20 | End: 2018-02-20

## 2017-12-20 NOTE — PROGRESS NOTES
Subjective   Bowen Concepcion is a 62 y.o. female.     Chief Complaint   Patient presents with   • Hospital Follow Up     Patient was d/c on 12/12 for Afib and thyroid issues--discharge summary in chart   • Weight Loss     follow up         History of Present Illness     Patient presents today with follow-up from the hospital afib and hyperthyroidism.  Patient was evaluated in the emergency room for anorexia and abdominal pain.  She presented with a 40 pound weight loss since September of this year.  In the emergency room she was found to have a new hyperthyroidism.  She also has a history of Afib.  She was found to be in A-fib in the emergency room.  Patient was admitted to the hospital.    While during admission, patient met with cardiology and endocrinology.  Endocrinology was routinely following the patient.  Patient's TSH was found to be 0.579, her free T4 was 2.03.  Patient was discharged with labs pending.  Today thyroid stimulating immunoglobulin still pending.  The thyroid peroxide antibody was normal.    At today's visit patient does complain about some visual changes.  She currently does see ophthalmology, but hasn't seen him in quite some time.  She continues to have weight loss.    The following portions of the patient's history were reviewed and updated as appropriate: allergies, current medications, past family history, past medical history, past social history, past surgical history and problem list.    Review of Systems   Constitutional: Positive for unexpected weight change. Negative for chills and fever.   HENT: Negative for congestion, rhinorrhea, sinus pain and sore throat.    Eyes: Positive for visual disturbance. Negative for photophobia.   Respiratory: Negative for cough, chest tightness and shortness of breath.    Cardiovascular: Negative for chest pain and palpitations.   Gastrointestinal: Negative for diarrhea, nausea and vomiting.   Genitourinary: Negative for dysuria, frequency and  urgency.   Skin: Negative for rash and wound.   Neurological: Negative for dizziness and syncope.   Psychiatric/Behavioral: Negative for behavioral problems and confusion.       Objective   Physical Exam   Constitutional: She is oriented to person, place, and time. She appears well-developed and well-nourished.   HENT:   Head: Normocephalic and atraumatic.   Right Ear: External ear normal.   Left Ear: External ear normal.   Mouth/Throat: Oropharynx is clear and moist.   Eyes: EOM are normal.   Neck: Normal range of motion. Neck supple.   Cardiovascular: Normal rate, regular rhythm and normal heart sounds.    Pulmonary/Chest: Effort normal and breath sounds normal. No respiratory distress.   Musculoskeletal: Normal range of motion.   Lymphadenopathy:     She has no cervical adenopathy.   Neurological: She is alert and oriented to person, place, and time.   Skin: Skin is warm.   Psychiatric: She has a normal mood and affect. Her behavior is normal.   Nursing note and vitals reviewed.        Assessment/Plan   Bowen was seen today for hospital follow up and weight loss.    Diagnoses and all orders for this visit:    Hyperthyroidism  -     Ambulatory Referral to Ophthalmology  -     Patient's currently on methimazole 10 mg daily.  Patient is to follow-up with endocrinology in 2 weeks.  -     Patient's heart rate currently controlled.    Visual changes  -     Ambulatory Referral to Ophthalmology  -     Visual changes could be affected by thyroid disorder.  Patient should have further evaluation done by ophthalmology.    Abnormal weight loss        -    I discussed with patient that her weight loss is most likely due to her hyperthyroidism.  I discussed with patient that once initiating methimazole which she started 2 days ago should help her get her thyroid under better control.  And this should stop her abnormal weight loss.    Atrial fibrillation with rapid ventricular response        -   Heart rate currently  controlled.  Patient's currently taking Toprol-XL as well as Cardizem.        -   Patient is to follow-up with cardiology.    Other orders  -     famotidine (PEPCID) 10 MG tablet; Take 1 tablet by mouth 2 (Two) Times a Day As Needed for Heartburn.          Current outpatient and discharge medications have been reconciled for the patient.  Aubrey Pitts MD      No Follow-up on file.  Follow up in one month    Much of this encounter note is an electronic transcription/translation of spoken language to printed text. The electronic translation of spoken language may permit erroneous, or at times, nonsensical words or phrases to be inadvertently transcribed; although I have reviewed the note for such errors, some may still exist.

## 2017-12-21 ENCOUNTER — TELEPHONE (OUTPATIENT)
Dept: INTERNAL MEDICINE | Facility: CLINIC | Age: 62
End: 2017-12-21

## 2017-12-21 LAB — TSI ACT/NOR SER: 57 % (ref 0–139)

## 2017-12-21 NOTE — TELEPHONE ENCOUNTER
----- Message from Aubrey Pitts MD sent at 12/21/2017  9:10 AM EST -----  The labs were reviewed. Please inform patient that labs were normal.

## 2018-01-09 ENCOUNTER — OFFICE VISIT (OUTPATIENT)
Dept: ENDOCRINOLOGY | Age: 63
End: 2018-01-09

## 2018-01-09 ENCOUNTER — TELEPHONE (OUTPATIENT)
Dept: INTERNAL MEDICINE | Facility: CLINIC | Age: 63
End: 2018-01-09

## 2018-01-09 VITALS
HEART RATE: 105 BPM | BODY MASS INDEX: 41.48 KG/M2 | SYSTOLIC BLOOD PRESSURE: 110 MMHG | HEIGHT: 62 IN | WEIGHT: 225.4 LBS | DIASTOLIC BLOOD PRESSURE: 80 MMHG

## 2018-01-09 DIAGNOSIS — E04.2 NONTOXIC MULTINODULAR GOITER: ICD-10-CM

## 2018-01-09 DIAGNOSIS — R63.4 ABNORMAL WEIGHT LOSS: ICD-10-CM

## 2018-01-09 DIAGNOSIS — E05.90 HYPERTHYROIDISM: Primary | ICD-10-CM

## 2018-01-09 LAB
T4 FREE SERPL-MCNC: 1.75 NG/DL (ref 0.93–1.7)
TSH SERPL DL<=0.005 MIU/L-ACNC: 0.46 MIU/ML (ref 0.27–4.2)

## 2018-01-09 PROCEDURE — 99214 OFFICE O/P EST MOD 30 MIN: CPT | Performed by: INTERNAL MEDICINE

## 2018-01-09 RX ORDER — METHIMAZOLE 10 MG/1
10 TABLET ORAL DAILY
Qty: 30 TABLET | Refills: 4 | Status: SHIPPED | OUTPATIENT
Start: 2018-01-09 | End: 2018-01-11 | Stop reason: SDUPTHER

## 2018-01-09 RX ORDER — CEFDINIR 300 MG/1
300 CAPSULE ORAL 2 TIMES DAILY
Qty: 14 CAPSULE | Refills: 0 | Status: SHIPPED | OUTPATIENT
Start: 2018-01-09 | End: 2018-01-16

## 2018-01-09 RX ORDER — ERYTHROMYCIN 5 MG/G
OINTMENT OPHTHALMIC AS NEEDED
COMMUNITY
Start: 2018-01-02 | End: 2018-02-13 | Stop reason: HOSPADM

## 2018-01-09 RX ORDER — INFLUENZA A VIRUS A/MICHIGAN/45/2015 X-275 (H1N1) ANTIGEN (FORMALDEHYDE INACTIVATED), INFLUENZA A VIRUS A/SINGAPORE/INFIMH-16-0019/2016 IVR-186 (H3N2) ANTIGEN (FORMALDEHYDE INACTIVATED), INFLUENZA B VIRUS B/PHUKET/3073/2013 ANTIGEN (FORMALDEHYDE INACTIVATED), AND INFLUENZA B VIRUS B/MARYLAND/15/2016 BX-69A ANTIGEN (FORMALDEHYDE INACTIVATED) 15; 15; 15; 15 UG/.5ML; UG/.5ML; UG/.5ML; UG/.5ML
INJECTION, SUSPENSION INTRAMUSCULAR
Status: ON HOLD | COMMUNITY
Start: 2017-10-27 | End: 2018-02-11

## 2018-01-09 NOTE — PROGRESS NOTES
62 y.o.  Patient Care Team:  Aubrey Pitts MD as PCP - General (Family Medicine)  Bright Rodriguez MD as Consulting Physician (Psychiatry)    Chief complaint     NEW PATIENT HOSPITAL F/U FOR HYPERTHYROIDISM.  HPI   Patient is a 62-year-old white female with a diagnosis of hyperthyroidism and multinodular goiter came for follow-up  Patient was seen by me during the hospitalization  Hyperthyroidism  Patient was sent home on methimazole 10 mg daily.  She reports compliance with medication  She denies any side effects  Multinodular goiter  Patient has multiple nodules in the goiter on the ultrasound  Abnormal weight loss  Patient apparently lost significant amount of weight over the past several months suspected to be from hyperthyroid state    Interval History of recent hospitalization  Patient is a 62-year-old white female admitted to the hospital through the emergency room for symptoms of nausea and anorexia and significant weight loss over the past several months  Patient apparently was following [employer] diet and was noticing nausea and also appetite and sleep problems.  She has been taking Ambien for sleep  She apparently lost nearly 40 pounds in the past 3 months.  Her nausea appears to be worsening and started vomiting yesterday hence she came to the emergency room.  She was noted to be in atrial fibrillation with rapid ventricular response and was started on Cardizem drip and admitted to the hospital  Patient is not treated with amiodarone.  Patient reports that she is on Topamax but denies any prior use of lithium or amiodarone  Patient does report to heat intolerance, sweating and occasional tremors.  Insomnia has been a serious issue for the past few months  Patient is unaware of any thyroid dysfunction in the past  Patient also denies any recent contrast studies prior to admission     Patient denies any difficulty swallowing or change in voice  Patient is evaluated by cardiology and is currently on  medication for atrial fibrillation  Further evaluation since admission suggested gallbladder sludge and stones but apparently not considered to be the etiology for nausea    The following portions of the patient's history were reviewed and updated as appropriate: allergies, current medications, past family history, past medical history, past social history, past surgical history and problem list.    Past Medical History:   Diagnosis Date   • Abnormal weight loss    • Arthritis     OSTEOARTHRITIS RIGHT KNEE AND INDEX FINGERS   • Atrial fibrillation    • Atrial fibrillation with RVR    • Bipolar disorder    • Cholelithiasis     uncompllicated   • Depression    • Hyperkalemia    • Hyperthyroidism 12/12/2017   • Hypokalemia    • Mild mitral regurgitation    • Mild tricuspid regurgitation    • Nausea and vomiting    • BARBIE (obstructive sleep apnea)    • Sleep apnea    • Uterine mass     muliple masses, 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       Family History   Problem Relation Age of Onset   • Heart disease Mother    • Hypertension Mother    • Depression Mother    • Cancer Father    • Depression Sister    • Depression Brother    • Depression Brother    • Depression Brother    • Depression Sister    • Vision loss Maternal Grandmother    • Vision loss Paternal Grandmother    • Breast cancer Maternal Aunt    • Breast cancer Maternal Aunt        Social History     Social History   • Marital status: Single     Spouse name: N/A   • Number of children: N/A   • Years of education: N/A     Occupational History   • Not on file.     Social History Main Topics   • Smoking status: Former Smoker     Packs/day: 2.00     Years: 26.00   • Smokeless tobacco: Never Used   • Alcohol use No   • Drug use: No   • Sexual activity: No     Other Topics Concern   • Not on file     Social History Narrative       No Known Allergies      Current Outpatient Prescriptions:   •  ALPRAZolam (XANAX) 0.25 MG tablet, Take 0.25 mg by mouth Daily As Needed for Anxiety., Disp: ,  Rfl:   •  Armodafinil 250 MG tablet, Take 125 mg by mouth Daily. BEEN TAKING 1/2 OF WHAT IS PRESCRIBED, Disp: , Rfl:   •  aspirin 81 MG tablet, Take 1 tablet by mouth Daily., Disp: , Rfl:   •  Cholecalciferol (VITAMIN D) 2000 units capsule, Take 2,000 Units by mouth Daily., Disp: , Rfl:   •  diltiaZEM CD (CARDIZEM CD) 120 MG 24 hr capsule, Take 1 capsule by mouth Daily for 30 days., Disp: 30 capsule, Rfl: 0  •  famotidine (PEPCID) 10 MG tablet, Take 1 tablet by mouth 2 (Two) Times a Day As Needed for Heartburn., Disp: 60 tablet, Rfl: 3  •  methIMAzole (TAPAZOLE) 10 MG tablet, Take 1 tablet by mouth Daily for 30 days., Disp: 30 tablet, Rfl: 0  •  metoprolol succinate XL (TOPROL-XL) 50 MG 24 hr tablet, Take 1 tablet by mouth Daily for 30 days., Disp: 30 tablet, Rfl: 0  •  topiramate (TOPAMAX) 100 MG tablet, Take 100 mg by mouth 2 (Two) Times a Day., Disp: , Rfl:   •  zolpidem CR (AMBIEN CR) 12.5 MG CR tablet, Take 12.5 mg by mouth At Night As Needed for Sleep., Disp: , Rfl:            Review of Systems   Constitutional: Positive for fatigue. Negative for chills and fever.   HENT: Negative for sore throat, trouble swallowing and voice change.    Eyes: Negative for pain and redness.   Respiratory: Negative for shortness of breath and wheezing.    Cardiovascular: Negative for chest pain and palpitations.   Gastrointestinal: Positive for constipation. Negative for abdominal pain, diarrhea, nausea and vomiting.   Endocrine: Positive for cold intolerance. Negative for heat intolerance.   Genitourinary: Positive for frequency.   Musculoskeletal: Negative for joint swelling.   Skin: Negative for rash and wound.   Neurological: Negative for tremors, light-headedness and headaches.   Hematological: Bruises/bleeds easily.   Psychiatric/Behavioral: Negative for confusion and sleep disturbance. The patient is not nervous/anxious.    All other systems reviewed and are negative.        Objective:  /80  Pulse 105  Ht 157.5 cm  "(62\")  Wt 102 kg (225 lb 6.4 oz)  BMI 41.23 kg/m2    Physical Exam   Constitutional: She is oriented to person, place, and time.   Eyes: EOM are normal. Pupils are equal, round, and reactive to light.   Neck: Normal range of motion. Neck supple.   Cardiovascular: Normal rate, regular rhythm, normal heart sounds and intact distal pulses.    Pulmonary/Chest: Effort normal and breath sounds normal.   Abdominal: Soft. Bowel sounds are normal.   Neurological: She is oriented to person, place, and time.   Skin: Skin is warm and dry.   Psychiatric: She has a normal mood and affect. Her behavior is normal.   Nursing note and vitals reviewed.        Results Review:    I reviewed the patient's new clinical results.  Admission on 12/09/2017, Discharged on 12/12/2017   No results displayed because visit has over 200 results.          No images are attached to the encounter.  Then he is he is  Bowen was seen today for hyperthyroidism.    Diagnoses and all orders for this visit:    Hyperthyroidism  -     T4, Free  -     TSH    Abnormal weight loss  -     T4, Free  -     TSH    Nontoxic multinodular goiter  -     T4, Free  -     TSH    Other orders  -     Discontinue: methIMAzole (TAPAZOLE) 10 MG tablet; Take 1 tablet by mouth Daily for 30 days.        Patient has been tolerating methimazole 10 mg daily in the morning  She denies any side effects from medication  She denies any itching or rash  She still reports extreme insomnia and anxiety and palpitations and profuse sweating and feeling cold at the same time    Patient also lost additional 8 since December 9.  She lost nearly 15 pounds in the past one month  Patient will get lab testing done today  Her medication may be adjusted if she is still hyperthyroid  On the contrary if she is hypothyroid I will be decreasing the dose  Thyroid antibodies were negative    In the thyroid status is euthyroid then I will recommend that she discuss with the psychiatrist regarding her " symptoms  Patient verbalized understanding    Patient will return to follow-up in 4 months  The total time spent for old record and lab review and face- to- face was more than 25 min of which greater than 15 min of time was spent on counseling the patient on recommended evaluation and treatment options, instructions for management/treatment and /or follow up  and importance of compliance with chosen management or jaren, He is drinkingatment options

## 2018-01-11 RX ORDER — METHIMAZOLE 10 MG/1
10 TABLET ORAL DAILY
Qty: 30 TABLET | Refills: 4 | Status: SHIPPED | OUTPATIENT
Start: 2018-01-11 | End: 2018-02-13 | Stop reason: HOSPADM

## 2018-01-15 ENCOUNTER — OFFICE VISIT (OUTPATIENT)
Dept: CARDIOLOGY | Facility: CLINIC | Age: 63
End: 2018-01-15

## 2018-01-15 VITALS
WEIGHT: 226 LBS | HEART RATE: 85 BPM | BODY MASS INDEX: 41.59 KG/M2 | DIASTOLIC BLOOD PRESSURE: 80 MMHG | SYSTOLIC BLOOD PRESSURE: 124 MMHG | HEIGHT: 62 IN

## 2018-01-15 DIAGNOSIS — E05.90 HYPERTHYROIDISM: ICD-10-CM

## 2018-01-15 DIAGNOSIS — I48.0 PAF (PAROXYSMAL ATRIAL FIBRILLATION) (HCC): Primary | ICD-10-CM

## 2018-01-15 PROBLEM — I48.91 ATRIAL FIBRILLATION WITH RAPID VENTRICULAR RESPONSE: Chronic | Status: ACTIVE | Noted: 2017-12-09

## 2018-01-15 PROCEDURE — 99213 OFFICE O/P EST LOW 20 MIN: CPT | Performed by: INTERNAL MEDICINE

## 2018-01-15 RX ORDER — METOPROLOL SUCCINATE 50 MG/1
50 TABLET, EXTENDED RELEASE ORAL DAILY
Qty: 90 TABLET | Refills: 3 | Status: SHIPPED | OUTPATIENT
Start: 2018-01-15 | End: 2018-02-13 | Stop reason: HOSPADM

## 2018-01-15 RX ORDER — DILTIAZEM HYDROCHLORIDE 120 MG/1
120 CAPSULE, COATED, EXTENDED RELEASE ORAL
Qty: 90 CAPSULE | Refills: 3 | Status: SHIPPED | OUTPATIENT
Start: 2018-01-15 | End: 2018-09-13

## 2018-01-15 NOTE — PATIENT INSTRUCTIONS
Atrial Fibrillation  Atrial fibrillation is a type of irregular or rapid heartbeat (arrhythmia). In atrial fibrillation, the heart quivers continuously in a chaotic pattern. This occurs when parts of the heart receive disorganized signals that make the heart unable to pump blood normally. This can increase the risk for stroke, heart failure, and other heart-related conditions. There are different types of atrial fibrillation, including:  · Paroxysmal atrial fibrillation. This type starts suddenly, and it usually stops on its own shortly after it starts.  · Persistent atrial fibrillation. This type often lasts longer than a week. It may stop on its own or with treatment.  · Long-lasting persistent atrial fibrillation. This type lasts longer than 12 months.  · Permanent atrial fibrillation. This type does not go away.  Talk with your health care provider to learn about the type of atrial fibrillation that you have.  What are the causes?  This condition is caused by some heart-related conditions or procedures, including:  · A heart attack.  · Coronary artery disease.  · Heart failure.  · Heart valve conditions.  · High blood pressure.  · Inflammation of the sac that surrounds the heart (pericarditis).  · Heart surgery.  · Certain heart rhythm disorders, such as Peres-Parkinson-White syndrome.  Other causes include:  · Pneumonia.  · Obstructive sleep apnea.  · Blockage of an artery in the lungs (pulmonary embolism, or PE).  · Lung cancer.  · Chronic lung disease.  · Thyroid problems, especially if the thyroid is overactive (hyperthyroidism).  · Caffeine.  · Excessive alcohol use or illegal drug use.  · Use of some medicines, including certain decongestants and diet pills.  Sometimes, the cause cannot be found.  What increases the risk?  This condition is more likely to develop in:  · People who are older in age.  · People who smoke.  · People who have diabetes mellitus.  · People who are overweight (obese).  · Athletes  who exercise vigorously.  What are the signs or symptoms?  Symptoms of this condition include:  · A feeling that your heart is beating rapidly or irregularly.  · A feeling of discomfort or pain in your chest.  · Shortness of breath.  · Sudden light-headedness or weakness.  · Getting tired easily during exercise.  In some cases, there are no symptoms.  How is this diagnosed?  Your health care provider may be able to detect atrial fibrillation when taking your pulse. If detected, this condition may be diagnosed with:  · An electrocardiogram (ECG).  · A Holter monitor test that records your heartbeat patterns over a 24-hour period.  · Transthoracic echocardiogram (TTE) to evaluate how blood flows through your heart.  · Transesophageal echocardiogram (CLARA) to view more detailed images of your heart.  · A stress test.  · Imaging tests, such as a CT scan or chest X-ray.  · Blood tests.  How is this treated?  The main goals of treatment are to prevent blood clots from forming and to keep your heart beating at a normal rate and rhythm. The type of treatment that you receive depends on many factors, such as your underlying medical conditions and how you feel when you are experiencing atrial fibrillation.  This condition may be treated with:  · Medicine to slow down the heart rate, bring the heart’s rhythm back to normal, or prevent clots from forming.  · Electrical cardioversion. This is a procedure that resets your heart’s rhythm by delivering a controlled, low-energy shock to the heart through your skin.  · Different types of ablation, such as catheter ablation, catheter ablation with pacemaker, or surgical ablation. These procedures destroy the heart tissues that send abnormal signals. When the pacemaker is used, it is placed under your skin to help your heart beat in a regular rhythm.  Follow these instructions at home:  · Take over-the counter and prescription medicines only as told by your health care provider.  · If  your health care provider prescribed a blood-thinning medicine (anticoagulant), take it exactly as told. Taking too much blood-thinning medicine can cause bleeding. If you do not take enough blood-thinning medicine, you will not have the protection that you need against stroke and other problems.  · Do not use tobacco products, including cigarettes, chewing tobacco, and e-cigarettes. If you need help quitting, ask your health care provider.  · If you have obstructive sleep apnea, manage your condition as told by your health care provider.  · Do not drink alcohol.  · Do not drink beverages that contain caffeine, such as coffee, soda, and tea.  · Maintain a healthy weight. Do not use diet pills unless your health care provider approves. Diet pills may make heart problems worse.  · Follow diet instructions as told by your health care provider.  · Exercise regularly as told by your health care provider.  · Keep all follow-up visits as told by your health care provider. This is important.  How is this prevented?  · Avoid drinking beverages that contain caffeine or alcohol.  · Avoid certain medicines, especially medicines that are used for breathing problems.  · Avoid certain herbs and herbal medicines, such as those that contain ephedra or ginseng.  · Do not use illegal drugs, such as cocaine and amphetamines.  · Do not smoke.  · Manage your high blood pressure.  Contact a health care provider if:  · You notice a change in the rate, rhythm, or strength of your heartbeat.  · You are taking an anticoagulant and you notice increased bruising.  · You tire more easily when you exercise or exert yourself.  Get help right away if:  · You have chest pain, abdominal pain, sweating, or weakness.  · You feel nauseous.  · You notice blood in your vomit, bowel movement, or urine.  · You have shortness of breath.  · You suddenly have swollen feet and ankles.  · You feel dizzy.  · You have sudden weakness or numbness of the face, arm,  or leg, especially on one side of the body.  · You have trouble speaking, trouble understanding, or both (aphasia).  · Your face or your eyelid droops on one side.  These symptoms may represent a serious problem that is an emergency. Do not wait to see if the symptoms will go away. Get medical help right away. Call your local emergency services (911 in the [place] Do not drive yourself to the hospital.   This information is not intended to replace advice given to you by your health care provider. Make sure you discuss any questions you have with your health care provider.  Document Released: 12/18/2006 Document Revised: 04/26/2017 Document Reviewed: 04/13/2016  Elsevier Interactive Patient Education © 2017 [employer]

## 2018-01-15 NOTE — PROGRESS NOTES
Subjective:     Encounter Date:01/15/2018      Patient ID: Bowen Concepcion is a 62 y.o. female.    Chief Complaint:  History of Present Illness    Dear Dr. Pitts,    I had the pleasure of seeing this patient in the office today for follow-up after recent hospitalization.  She presented with hyperthyroidism, acute.  She was also found to have paroxysmal atrial fibrillation.  She was started on metoprolol along with diltiazem.  Previously she had been on propranolol for anxiety.    She just was in to see her endocrinologist and they're seen improvement although she still is hyperthyroid.    At no time she felt any palpitations or heart racing.This patient denies any chest pain, pressure, tightness, squeezing, or heartburn.  This patient has not experienced any feeling of palpitations, tachycardia or heart racing and no presyncope or syncope.  There has not been any problems with dizziness or lightheadedness.  There has not been any orthopnea or PND, and no problems with lower extremity edema.  This patient denies any shortness of breath at rest or with activity and has not had any wheezing.   The following portions of the patient's history were reviewed and updated as appropriate: allergies, current medications, past family history, past medical history, past social history, past surgical history and problem list.    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     Social History Main Topics   • Smoking status: Former Smoker     Packs/day: 2.00     Years: 26.00   • Smokeless tobacco: Never Used   • Alcohol use No   • Drug use: No   • Sexual activity: No     Other Topics Concern   • Not on file     Social History Narrative       Review of Systems   Constitution: Negative for chills, decreased appetite, fever and night sweats.   HENT: Negative for ear discharge, ear pain, hearing loss, nosebleeds and sore throat.    Eyes: Negative for blurred vision, double vision and pain.   Cardiovascular: Negative for cyanosis.   Respiratory: Negative for hemoptysis and sputum production.    Endocrine: Negative for cold intolerance and heat intolerance.   Hematologic/Lymphatic: Negative for adenopathy.   Skin: Negative for dry skin, itching, nail changes, rash and suspicious lesions.   Musculoskeletal: Negative for arthritis, gout, muscle cramps, muscle weakness, myalgias and neck pain.   Gastrointestinal: Negative for anorexia, bowel incontinence, constipation, diarrhea, dysphagia, hematemesis and jaundice.   Genitourinary: Negative for bladder incontinence, dysuria, flank pain, frequency, hematuria and nocturia.   Neurological: Negative for focal weakness, numbness, paresthesias and seizures.   Psychiatric/Behavioral: Negative for altered mental status, hallucinations, hypervigilance, suicidal ideas and thoughts of violence. The patient is nervous/anxious.    Allergic/Immunologic: Negative for persistent infections.       Procedures       Objective:     Vitals:    01/15/18 1439   BP: 124/80   Pulse: 85   Weight: 103 kg (226 lb)   Height: 157.5 cm (62.01\")         Physical Exam   Constitutional: She is oriented to person, place, and time. She appears well-developed and well-nourished. No " distress.   HENT:   Head: Normocephalic and atraumatic.   Nose: Nose normal.   Mouth/Throat: Oropharynx is clear and moist.   Eyes: Conjunctivae and EOM are normal. Pupils are equal, round, and reactive to light. Right eye exhibits no discharge. Left eye exhibits no discharge.   Neck: Normal range of motion. Neck supple. No tracheal deviation present. No thyromegaly present.   Cardiovascular: Normal rate, regular rhythm, S1 normal, S2 normal, normal heart sounds and normal pulses.  Exam reveals no S3.    Pulmonary/Chest: Effort normal and breath sounds normal. No stridor. No respiratory distress. She exhibits no tenderness.   Abdominal: Soft. Bowel sounds are normal. She exhibits no distension and no mass. There is no tenderness. There is no rebound and no guarding.   Musculoskeletal: Normal range of motion. She exhibits no tenderness or deformity.   Lymphadenopathy:     She has no cervical adenopathy.   Neurological: She is alert and oriented to person, place, and time. She has normal reflexes.   Skin: Skin is warm and dry. No rash noted. She is not diaphoretic. No erythema.   Psychiatric: She has a normal mood and affect. Thought content normal.       Lab Review:             Performed        Assessment:          Diagnosis Plan   1. PAF (paroxysmal atrial fibrillation)     2. Hyperthyroidism            Plan:       1. Atrial Fibrillation and Atrial Flutter  Assessment  • The patient has paroxysmal atrial fibrillation  • This is non-valvular in etiology  • The transient or reversible cause of the patient's arrhythmia is hyperthyroidism  • The patient's CHADS2-VASc score is 1  • A [id number] score of 1 is considered an intermediate risk for a thromboembolic event    Plan  • Continue aspirin for antithrombotic therapy, bleeding issues discussed  • Continue beta blocker for rhythm control  • Currently we will continue her current medical therapy.  I will see her back in 3 months.  It sounds like she will be euthyroid  at that point, and then we can look to discontinuing her diltiazem and potentially switch her back to propranolol which she said she felt a little bit better on than the metoprolol which they started in the hospital.    Thank you very much for allowing us to participate in the care of this pleasant patient.  Please don't hesitate to call if I can be of assistance in any way.      Current Outpatient Prescriptions:   •  ALPRAZolam (XANAX) 0.25 MG tablet, Take 0.25 mg by mouth Daily As Needed for Anxiety., Disp: , Rfl:   •  aspirin 81 MG tablet, Take 1 tablet by mouth Daily., Disp: , Rfl:   •  cefdinir (OMNICEF) 300 MG capsule, Take 1 capsule by mouth 2 (Two) Times a Day for 7 days., Disp: 14 capsule, Rfl: 0  •  Cholecalciferol (VITAMIN D) 2000 units capsule, Take 2,000 Units by mouth Daily., Disp: , Rfl:   •  diltiaZEM CD (CARDIZEM CD) 120 MG 24 hr capsule, Take 1 capsule by mouth Daily., Disp: 90 capsule, Rfl: 3  •  erythromycin (ROMYCIN) 5 MG/GM ophthalmic ointment, , Disp: , Rfl:   •  famotidine (PEPCID) 10 MG tablet, Take 1 tablet by mouth 2 (Two) Times a Day As Needed for Heartburn., Disp: 60 tablet, Rfl: 3  •  FLUZONE QUADRIVALENT 0.5 ML suspension prefilled syringe injection, , Disp: , Rfl:   •  methIMAzole (TAPAZOLE) 10 MG tablet, Take 1 tablet by mouth Daily for 30 days., Disp: 30 tablet, Rfl: 4  •  metoprolol succinate XL (TOPROL-XL) 50 MG 24 hr tablet, Take 1 tablet by mouth Daily., Disp: 90 tablet, Rfl: 3  •  topiramate (TOPAMAX) 100 MG tablet, Take 100 mg by mouth 2 (Two) Times a Day., Disp: , Rfl:   •  zolpidem CR (AMBIEN CR) 12.5 MG CR tablet, Take 12.5 mg by mouth At Night As Needed for Sleep., Disp: , Rfl:          EMR Dragon/Transcription disclaimer:    Much of this encounter note is an electronic transcription/translation of spoken language to printed text. The electronic translation of spoken language may permit erroneous, or at times, nonsensical words or phrases to be inadvertently transcribed;  Although I have reviewed the note for such errors, some may still exist.

## 2018-02-08 ENCOUNTER — TELEPHONE (OUTPATIENT)
Dept: CARDIOLOGY | Facility: CLINIC | Age: 63
End: 2018-02-08

## 2018-02-08 NOTE — TELEPHONE ENCOUNTER
02/08/18  2:14 PM  Bowen Concepcion  1955    Home Phone [phone]     Ms. Concepcion reports that she has gotten progressively more jaundiced over the past 2-3 weeks. She saw her psychiatrist today who advised that she seek medical attention. He reviewed her medication list and suggested that the jaundice may be a side effect of diltiazem which she has been taking since 12/12/17. He suggested she speak with Dr. Murdock about this possibility.    Does she need to hold diltiazem?    I also advised that she contact her PCP for further evaluation of jaundice.    Ama DAHL RN

## 2018-02-09 ENCOUNTER — TELEPHONE (OUTPATIENT)
Dept: ENDOCRINOLOGY | Age: 63
End: 2018-02-09

## 2018-02-09 DIAGNOSIS — E05.90 HYPERTHYROIDISM: Primary | ICD-10-CM

## 2018-02-09 NOTE — TELEPHONE ENCOUNTER
----- Message from Nika S Tico sent at 2/9/2018  2:45 PM EST -----  Contact: PATIENT   I CALLED PATIENT AND ADVISED HER THAT I HAD SPOKE TO YOU AND WE ALSO SAW IN CHART THAT SHE HAD BEEN REQUESTED TO CONTACT HER PCP.   PATIENT SAID SHE HAS CONTACTED PCP AND CANNOT GET IN TO SEE PCP DR. RAMON UNTIL 2-20-18. SHE SAID SHE CONTACTED [facility] AND THEY DO NOT DO BLOOD TEST FOR LIVER. SHE SAID DR. STEWART MELVIN PSYCHIATRIST SAID METHIMAZOLE, 10MG, 1XDAY THAT DR. PATTON PRESCRIBES  HAS SIDE EFFECTS THAT POSSIBLY HAS EFFECT ON LIVER AND ITCHINESS.   SHE SAID JACKELIN AT DR. KEYES SAID THEIR OFFICE DOES NOT HAVE A WAY OF TESTING LIVER.  PT CALL BACK [phone]     ----- Message -----     From: Nika Doshi     Sent: 2/9/2018   2:18 PM       To: Jeanie Doshi MA    PATIENT CALLED BACK CHECKING ON STATUS OF APPT TO BE SCHEDULED. SHE SAID SHE HAS EXTREME ITCHINESS ALL OVER HER BODY. SHE NOTICED LAST NIGHT SHE HAS RASH ON ONE LEG BUT NOT AS BAD TODAY.   ----- Message -----     From: Nika Doshi     Sent: 2/8/2018   1:32 PM       To: Jeanie Doshi MA    PATIENT ASKING FOR APPT WITH DR. ABDI MURO SAYING SHE IS JAUNDICE DUE TO MEDICATION AND DR. STEWART MELVIN  PSYCHIATRIST REQUESTED TODAY  THAT SHE SEE DR. ABDI MURO. I ADVISED PATIENT THAT I WILL CHECK AND CALL HER BACK. DOES DR. PATTON WANT ME TO SCHEDULE IN ONE OF THE BLOCKED SLOTS TOMORROW.  PT. [phone]      CALLED PATIENT AND SCHEDULED FOR Monday AT 11:15 AND SHE WILL GO TO [facility] OVER THE WEEKEND FOR LABS. PER DR. PATTON SHE IS TO STOP METHIMAZOLE UNTIL SHE COMES IN.

## 2018-02-10 ENCOUNTER — LAB (OUTPATIENT)
Dept: LAB | Facility: HOSPITAL | Age: 63
End: 2018-02-10
Attending: INTERNAL MEDICINE

## 2018-02-10 DIAGNOSIS — E05.90 HYPERTHYROIDISM: ICD-10-CM

## 2018-02-10 LAB
ALBUMIN SERPL-MCNC: 3.5 G/DL (ref 3.5–5.2)
ALBUMIN/GLOB SERPL: 1.1 G/DL
ALP SERPL-CCNC: 257 U/L (ref 39–117)
ALT SERPL W P-5'-P-CCNC: 59 U/L (ref 1–33)
ANION GAP SERPL CALCULATED.3IONS-SCNC: 15.4 MMOL/L
AST SERPL-CCNC: 65 U/L (ref 1–32)
BASOPHILS # BLD AUTO: 0.06 10*3/MM3 (ref 0–0.2)
BASOPHILS NFR BLD AUTO: 0.6 % (ref 0–1.5)
BILIRUB SERPL-MCNC: 19.9 MG/DL (ref 0.1–1.2)
BUN BLD-MCNC: 15 MG/DL (ref 8–23)
BUN/CREAT SERPL: 15.3 (ref 7–25)
CALCIUM SPEC-SCNC: 9.6 MG/DL (ref 8.6–10.5)
CHLORIDE SERPL-SCNC: 103 MMOL/L (ref 98–107)
CO2 SERPL-SCNC: 22.6 MMOL/L (ref 22–29)
CREAT BLD-MCNC: 0.98 MG/DL (ref 0.57–1)
DEPRECATED RDW RBC AUTO: 56.1 FL (ref 37–54)
EOSINOPHIL # BLD AUTO: 0.23 10*3/MM3 (ref 0–0.7)
EOSINOPHIL NFR BLD AUTO: 2.2 % (ref 0.3–6.2)
ERYTHROCYTE [DISTWIDTH] IN BLOOD BY AUTOMATED COUNT: 18.8 % (ref 11.7–13)
GFR SERPL CREATININE-BSD FRML MDRD: 57 ML/MIN/1.73
GLOBULIN UR ELPH-MCNC: 3.1 GM/DL
GLUCOSE BLD-MCNC: 129 MG/DL (ref 65–99)
HCT VFR BLD AUTO: 38.7 % (ref 35.6–45.5)
HGB BLD-MCNC: 13.3 G/DL (ref 11.9–15.5)
IMM GRANULOCYTES # BLD: 0.03 10*3/MM3 (ref 0–0.03)
IMM GRANULOCYTES NFR BLD: 0.3 % (ref 0–0.5)
LYMPHOCYTES # BLD AUTO: 2.4 10*3/MM3 (ref 0.9–4.8)
LYMPHOCYTES NFR BLD AUTO: 22.5 % (ref 19.6–45.3)
MCH RBC QN AUTO: 28.5 PG (ref 26.9–32)
MCHC RBC AUTO-ENTMCNC: 34.4 G/DL (ref 32.4–36.3)
MCV RBC AUTO: 82.9 FL (ref 80.5–98.2)
MONOCYTES # BLD AUTO: 0.98 10*3/MM3 (ref 0.2–1.2)
MONOCYTES NFR BLD AUTO: 9.2 % (ref 5–12)
NEUTROPHILS # BLD AUTO: 6.96 10*3/MM3 (ref 1.9–8.1)
NEUTROPHILS NFR BLD AUTO: 65.2 % (ref 42.7–76)
PLATELET # BLD AUTO: 302 10*3/MM3 (ref 140–500)
PMV BLD AUTO: 10.2 FL (ref 6–12)
POTASSIUM BLD-SCNC: 3.6 MMOL/L (ref 3.5–5.2)
PROT SERPL-MCNC: 6.6 G/DL (ref 6–8.5)
RBC # BLD AUTO: 4.67 10*6/MM3 (ref 3.9–5.2)
SODIUM BLD-SCNC: 141 MMOL/L (ref 136–145)
T4 FREE SERPL-MCNC: 1.68 NG/DL (ref 0.93–1.7)
TSH SERPL DL<=0.05 MIU/L-ACNC: 2.1 MIU/ML (ref 0.27–4.2)
WBC NRBC COR # BLD: 10.66 10*3/MM3 (ref 4.5–10.7)

## 2018-02-10 PROCEDURE — 84439 ASSAY OF FREE THYROXINE: CPT

## 2018-02-10 PROCEDURE — 80053 COMPREHEN METABOLIC PANEL: CPT

## 2018-02-10 PROCEDURE — 85025 COMPLETE CBC W/AUTO DIFF WBC: CPT

## 2018-02-10 PROCEDURE — 84443 ASSAY THYROID STIM HORMONE: CPT

## 2018-02-11 ENCOUNTER — HOSPITAL ENCOUNTER (OUTPATIENT)
Facility: HOSPITAL | Age: 63
Setting detail: OBSERVATION
Discharge: HOME | End: 2018-02-13
Attending: INTERNAL MEDICINE | Admitting: INTERNAL MEDICINE

## 2018-02-11 ENCOUNTER — APPOINTMENT (OUTPATIENT)
Dept: MRI IMAGING | Facility: HOSPITAL | Age: 63
End: 2018-02-11

## 2018-02-11 DIAGNOSIS — R17 JAUNDICE: Primary | ICD-10-CM

## 2018-02-11 PROBLEM — R63.0 ANOREXIA: Status: ACTIVE | Noted: 2018-02-11

## 2018-02-11 LAB
ALBUMIN SERPL-MCNC: 3.5 G/DL (ref 3.5–5.2)
ALBUMIN/GLOB SERPL: 1.1 G/DL
ALP SERPL-CCNC: 253 U/L (ref 39–117)
ALT SERPL W P-5'-P-CCNC: 58 U/L (ref 1–33)
ANION GAP SERPL CALCULATED.3IONS-SCNC: 17.6 MMOL/L
AST SERPL-CCNC: 62 U/L (ref 1–32)
BILIRUB CONJ SERPL-MCNC: >10 MG/DL (ref 0–0.3)
BILIRUB SERPL-MCNC: 18.8 MG/DL (ref 0.1–1.2)
BUN BLD-MCNC: 19 MG/DL (ref 8–23)
BUN/CREAT SERPL: 23.2 (ref 7–25)
CALCIUM SPEC-SCNC: 9.3 MG/DL (ref 8.6–10.5)
CHLORIDE SERPL-SCNC: 102 MMOL/L (ref 98–107)
CO2 SERPL-SCNC: 21.4 MMOL/L (ref 22–29)
CREAT BLD-MCNC: 0.82 MG/DL (ref 0.57–1)
DEPRECATED RDW RBC AUTO: 58.4 FL (ref 37–54)
ERYTHROCYTE [DISTWIDTH] IN BLOOD BY AUTOMATED COUNT: 19.1 % (ref 11.7–13)
GFR SERPL CREATININE-BSD FRML MDRD: 70 ML/MIN/1.73
GLOBULIN UR ELPH-MCNC: 3.1 GM/DL
GLUCOSE BLD-MCNC: 100 MG/DL (ref 65–99)
HAPTOGLOB SERPL-MCNC: 145 MG/DL (ref 30–200)
HCT VFR BLD AUTO: 38.9 % (ref 35.6–45.5)
HGB BLD-MCNC: 13 G/DL (ref 11.9–15.5)
LDH SERPL-CCNC: 155 U/L (ref 135–214)
MCH RBC QN AUTO: 28.3 PG (ref 26.9–32)
MCHC RBC AUTO-ENTMCNC: 33.4 G/DL (ref 32.4–36.3)
MCV RBC AUTO: 84.7 FL (ref 80.5–98.2)
PLATELET # BLD AUTO: 350 10*3/MM3 (ref 140–500)
PMV BLD AUTO: 11.1 FL (ref 6–12)
POTASSIUM BLD-SCNC: 3.4 MMOL/L (ref 3.5–5.2)
PROT SERPL-MCNC: 6.6 G/DL (ref 6–8.5)
RBC # BLD AUTO: 4.59 10*6/MM3 (ref 3.9–5.2)
SODIUM BLD-SCNC: 141 MMOL/L (ref 136–145)
WBC NRBC COR # BLD: 11.12 10*3/MM3 (ref 4.5–10.7)

## 2018-02-11 PROCEDURE — G0378 HOSPITAL OBSERVATION PER HR: HCPCS

## 2018-02-11 PROCEDURE — A9577 INJ MULTIHANCE: HCPCS | Performed by: INTERNAL MEDICINE

## 2018-02-11 PROCEDURE — 96374 THER/PROPH/DIAG INJ IV PUSH: CPT

## 2018-02-11 PROCEDURE — 93010 ELECTROCARDIOGRAM REPORT: CPT | Performed by: INTERNAL MEDICINE

## 2018-02-11 PROCEDURE — 83615 LACTATE (LD) (LDH) ENZYME: CPT | Performed by: INTERNAL MEDICINE

## 2018-02-11 PROCEDURE — 99222 1ST HOSP IP/OBS MODERATE 55: CPT | Performed by: INTERNAL MEDICINE

## 2018-02-11 PROCEDURE — 99223 1ST HOSP IP/OBS HIGH 75: CPT | Performed by: INTERNAL MEDICINE

## 2018-02-11 PROCEDURE — 25010000002 ONDANSETRON PER 1 MG: Performed by: INTERNAL MEDICINE

## 2018-02-11 PROCEDURE — 74183 MRI ABD W/O CNTR FLWD CNTR: CPT

## 2018-02-11 PROCEDURE — 96372 THER/PROPH/DIAG INJ SC/IM: CPT

## 2018-02-11 PROCEDURE — 0 GADOBENATE DIMEGLUMINE 529 MG/ML SOLUTION: Performed by: INTERNAL MEDICINE

## 2018-02-11 PROCEDURE — 83010 ASSAY OF HAPTOGLOBIN QUANT: CPT | Performed by: INTERNAL MEDICINE

## 2018-02-11 PROCEDURE — 82248 BILIRUBIN DIRECT: CPT | Performed by: INTERNAL MEDICINE

## 2018-02-11 PROCEDURE — 93005 ELECTROCARDIOGRAM TRACING: CPT | Performed by: INTERNAL MEDICINE

## 2018-02-11 PROCEDURE — 85027 COMPLETE CBC AUTOMATED: CPT | Performed by: INTERNAL MEDICINE

## 2018-02-11 PROCEDURE — 80053 COMPREHEN METABOLIC PANEL: CPT | Performed by: INTERNAL MEDICINE

## 2018-02-11 PROCEDURE — 25010000002 ENOXAPARIN PER 10 MG: Performed by: INTERNAL MEDICINE

## 2018-02-11 PROCEDURE — 96361 HYDRATE IV INFUSION ADD-ON: CPT

## 2018-02-11 PROCEDURE — 25810000003 SODIUM CHLORIDE 0.9 % WITH KCL 20 MEQ 20-0.9 MEQ/L-% SOLUTION: Performed by: INTERNAL MEDICINE

## 2018-02-11 RX ORDER — DILTIAZEM HYDROCHLORIDE 120 MG/1
120 CAPSULE, COATED, EXTENDED RELEASE ORAL
Status: DISCONTINUED | OUTPATIENT
Start: 2018-02-11 | End: 2018-02-13 | Stop reason: HOSPADM

## 2018-02-11 RX ORDER — POTASSIUM CHLORIDE 750 MG/1
20 CAPSULE, EXTENDED RELEASE ORAL ONCE
Status: COMPLETED | OUTPATIENT
Start: 2018-02-11 | End: 2018-02-11

## 2018-02-11 RX ORDER — CLONAZEPAM 0.5 MG/1
0.5 TABLET ORAL 2 TIMES DAILY PRN
COMMUNITY

## 2018-02-11 RX ORDER — ONDANSETRON 2 MG/ML
4 INJECTION INTRAMUSCULAR; INTRAVENOUS EVERY 4 HOURS PRN
Status: DISCONTINUED | OUTPATIENT
Start: 2018-02-11 | End: 2018-02-13 | Stop reason: HOSPADM

## 2018-02-11 RX ORDER — TRAMADOL HYDROCHLORIDE 50 MG/1
50 TABLET ORAL EVERY 6 HOURS PRN
Status: DISCONTINUED | OUTPATIENT
Start: 2018-02-11 | End: 2018-02-13 | Stop reason: HOSPADM

## 2018-02-11 RX ORDER — ZOLPIDEM TARTRATE 5 MG/1
5 TABLET ORAL NIGHTLY PRN
Status: DISCONTINUED | OUTPATIENT
Start: 2018-02-11 | End: 2018-02-13 | Stop reason: HOSPADM

## 2018-02-11 RX ORDER — NALOXONE HCL 0.4 MG/ML
0.4 VIAL (ML) INJECTION
Status: DISCONTINUED | OUTPATIENT
Start: 2018-02-11 | End: 2018-02-13

## 2018-02-11 RX ORDER — SODIUM CHLORIDE AND POTASSIUM CHLORIDE 150; 900 MG/100ML; MG/100ML
100 INJECTION, SOLUTION INTRAVENOUS CONTINUOUS
Status: DISCONTINUED | OUTPATIENT
Start: 2018-02-11 | End: 2018-02-12

## 2018-02-11 RX ORDER — SODIUM CHLORIDE 0.9 % (FLUSH) 0.9 %
1-10 SYRINGE (ML) INJECTION AS NEEDED
Status: DISCONTINUED | OUTPATIENT
Start: 2018-02-11 | End: 2018-02-13 | Stop reason: HOSPADM

## 2018-02-11 RX ORDER — MORPHINE SULFATE 2 MG/ML
1 INJECTION, SOLUTION INTRAMUSCULAR; INTRAVENOUS EVERY 4 HOURS PRN
Status: DISCONTINUED | OUTPATIENT
Start: 2018-02-11 | End: 2018-02-13

## 2018-02-11 RX ORDER — METOPROLOL SUCCINATE 50 MG/1
50 TABLET, EXTENDED RELEASE ORAL DAILY
Status: DISCONTINUED | OUTPATIENT
Start: 2018-02-11 | End: 2018-02-12

## 2018-02-11 RX ORDER — CLONAZEPAM 0.5 MG/1
0.5 TABLET ORAL 2 TIMES DAILY PRN
Status: DISCONTINUED | OUTPATIENT
Start: 2018-02-11 | End: 2018-02-13 | Stop reason: HOSPADM

## 2018-02-11 RX ADMIN — DILTIAZEM HYDROCHLORIDE 120 MG: 120 CAPSULE, COATED, EXTENDED RELEASE ORAL at 16:00

## 2018-02-11 RX ADMIN — GADOBENATE DIMEGLUMINE 20 ML: 529 INJECTION, SOLUTION INTRAVENOUS at 14:56

## 2018-02-11 RX ADMIN — ONDANSETRON 4 MG: 2 INJECTION INTRAMUSCULAR; INTRAVENOUS at 13:44

## 2018-02-11 RX ADMIN — POTASSIUM CHLORIDE AND SODIUM CHLORIDE 100 ML/HR: 900; 150 INJECTION, SOLUTION INTRAVENOUS at 16:07

## 2018-02-11 RX ADMIN — METOPROLOL SUCCINATE 50 MG: 50 TABLET, FILM COATED, EXTENDED RELEASE ORAL at 18:09

## 2018-02-11 RX ADMIN — ZOLPIDEM TARTRATE 5 MG: 5 TABLET ORAL at 22:51

## 2018-02-11 RX ADMIN — POTASSIUM CHLORIDE 20 MEQ: 750 CAPSULE, EXTENDED RELEASE ORAL at 20:35

## 2018-02-11 RX ADMIN — POTASSIUM CHLORIDE AND SODIUM CHLORIDE 100 ML/HR: 900; 150 INJECTION, SOLUTION INTRAVENOUS at 22:43

## 2018-02-11 RX ADMIN — ENOXAPARIN SODIUM 40 MG: 40 INJECTION SUBCUTANEOUS at 15:59

## 2018-02-11 NOTE — H&P
Name: Bowen Concepcion ADMIT: 2018   : 1955  PCP: Aubrey Pitts MD    MRN: [id number] LOS: 0 days   AGE/SEX: 62 y.o. female  ROOM: [facility]     Jaundice, nausea, weight loss      Patient is a 62 y.o. woman whom we saw 2017 for anorexia and nausea.  Evaluation at that time was unrevealing.  She was discharged home.  Her symptoms improved for short time.  She has had ongoing and worsening nausea with anorexia.  She has lost 20 pounds in the last 2 months.  She has early satiety.  No abdominal pain.  No fever or chills.  Eating or drinking does not make her symptoms worse but she no appetite at all.  No dizziness or lightheadedness.  She is weak.  No diarrhea or constipation.  Occasional heartburn.  Her skin is itching.  She has noticed yellow discoloration of her skin.  She saw Dr. Diaz yesterday and labs were drawn.  Her bilirubin level was 17.  She is admitted for further evaluation and treatment.  No other associated symptoms or exacerbating or alleviating factors      Past medical history  Hyperthyroidism, on Tapazole  Atrial fibrillation  Sleep apnea, on CPAP  Fibroids  Bipolar disorder    Past Surgical History:   Procedure Laterality Date   • COLONOSCOPY  2004       Prescriptions Prior to Admission   Medication Sig Dispense Refill Last Dose   • clonazePAM (KlonoPIN) 0.5 MG tablet Take 0.5 mg by mouth 2 (Two) Times a Day As Needed for Anxiety.      • aspirin 81 MG tablet Take 1 tablet by mouth Daily.   Taking   • Cholecalciferol (VITAMIN D) 2000 units capsule Take 2,000 Units by mouth Daily.   Taking   • diltiaZEM CD (CARDIZEM CD) 120 MG 24 hr capsule Take 1 capsule by mouth Daily. 90 capsule 3    • erythromycin (ROMYCIN) 5 MG/GM ophthalmic ointment Administer  to both eyes As Needed.   Taking   • famotidine (PEPCID) 10 MG tablet Take 1 tablet by mouth 2 (Two) Times a Day As Needed for Heartburn. 60 tablet 3 Taking   • [] methIMAzole (TAPAZOLE) 10 MG tablet Take 1  tablet by mouth Daily for 30 days. 30 tablet 4 Taking   • metoprolol succinate XL (TOPROL-XL) 50 MG 24 hr tablet Take 1 tablet by mouth Daily. 90 tablet 3    • topiramate (TOPAMAX) 100 MG tablet Take 100 mg by mouth 2 (Two) Times a Day.   Taking   • zolpidem CR (AMBIEN CR) 12.5 MG CR tablet Take 12.5 mg by mouth At Night As Needed for Sleep.   Taking     Allergies:  Review of patient's allergies indicates no known allergies.    Social History   Substance Use Topics   • Smoking status: Former Smoker     Packs/day: 2.00     Years: 26.00   • Smokeless tobacco: Never Used   • Alcohol use No   Single.  She is on disability.  She smoked 2 packs a day for 25 years but none for the last 20 years.  She does not drink alcohol.  She used to work as an analyst    Family History   Problem Relation Age of Onset   • Heart disease Mother    • Hypertension Mother    • Depression Mother    • Cancer Father    • Depression Sister    • Depression Brother    • Depression Brother    • Depression Brother    • Depression Sister    • Vision loss Maternal Grandmother    • Vision loss Paternal Grandmother    • Breast cancer Maternal Aunt    • Breast cancer Maternal Aunt        Review of Systems   Constitutional: Positive for activity change, appetite change, fatigue and unexpected weight change. Negative for chills, diaphoresis and fever.   HENT: Negative for dental problem, ear pain, sore throat and trouble swallowing.    Eyes: Negative for visual disturbance.   Respiratory: Negative for cough, chest tightness, shortness of breath and wheezing.    Cardiovascular: Positive for leg swelling. Negative for chest pain and palpitations.   Gastrointestinal: Positive for nausea and vomiting. Negative for abdominal distention, abdominal pain, constipation and diarrhea.   Endocrine: Negative for polyphagia and polyuria.   Genitourinary: Positive for dysuria. Negative for urgency.        Slight dysuria several days ago   Musculoskeletal: Positive for  arthralgias. Negative for joint swelling and myalgias.        Right knee is achy   Skin: Positive for color change. Negative for rash and wound.   Neurological: Positive for weakness. Negative for dizziness, numbness and headaches.   Hematological: Bruises/bleeds easily.   Psychiatric/Behavioral: Negative for agitation and behavioral problems.         Vital Signs  Temp:  [97.4 °F (36.3 °C)] 97.4 °F (36.3 °C)  Heart Rate:  [89] 89  Resp:  [16] 16  BP: (120)/(82) 120/82  SpO2:  [100 %] 100 %  on   ;   O2 Device: room air  Body mass index is 39.14 kg/(m^2).    Physical Exam   Constitutional: She is oriented to person, place, and time. She appears well-developed and well-nourished. No distress.   Morbidly obese.  Jaundice noted   HENT:   Head: Normocephalic and atraumatic.   Right Ear: External ear normal.   Left Ear: External ear normal.   Nose: Nose normal.   Mouth/Throat: No oropharyngeal exudate.   Yellow discoloration under the tongue   Eyes: Conjunctivae and EOM are normal. Pupils are equal, round, and reactive to light. Right eye exhibits no discharge. Left eye exhibits no discharge. Scleral icterus is present.   Neck: Normal range of motion. Neck supple. No JVD present. No tracheal deviation present. No thyromegaly present.   Cardiovascular: Exam reveals no friction rub.    No murmur heard.  Irregularly irregular.  Rate is okay.   Pulmonary/Chest: No stridor. No respiratory distress. She has no wheezes. She has no rales. She exhibits no tenderness.   Breath sounds decreased, equal, clear   Abdominal: Soft. Bowel sounds are normal. She exhibits distension. She exhibits no mass. There is no tenderness.   Obese.  Distended.  No hepatosplenomegaly.   Musculoskeletal: She exhibits edema. She exhibits no deformity.   Trace to 1+ edema   Lymphadenopathy:     She has no cervical adenopathy.   Neurological: She is alert and oriented to person, place, and time. No cranial nerve deficit.   Sensation decreased with the  plastic filament in the distal lower extremities.  No asterixis.   Skin: Skin is warm and dry. She is not diaphoretic.   Jaundice.  Linear scar left volar forearm distally.  Resolving ecchymoses left lower shin   Psychiatric: She has a normal mood and affect. Her behavior is normal.   Nursing note and vitals reviewed.      Results Review:   I reviewed the patient's new clinical results.    Lab Results   Component Value Date    GLUCOSE 100 (H) 02/11/2018    BUN 19 02/11/2018    CREATININE 0.82 02/11/2018    EGFRIFNONA 70 02/11/2018    EGFRIFAFRI 96 11/16/2017    BCR 23.2 02/11/2018    K 3.4 (L) 02/11/2018    CO2 21.4 (L) 02/11/2018    CALCIUM 9.3 02/11/2018    PROTENTOTREF 6.8 11/09/2017    ALBUMIN 3.50 02/11/2018    LABIL2 1.1 02/11/2018    AST 62 (H) 02/11/2018    ALT 58 (H) 02/11/2018       Lab Results   Component Value Date    WBC 11.12 (H) 02/11/2018    HGB 13.0 02/11/2018    HCT 38.9 02/11/2018    MCV 84.7 02/11/2018     02/11/2018             Imaging Results (last 24 hours)     ** No results found for the last 24 hours. **            Assessment/Plan   1.  Painless jaundice with weight loss, early satiety and nausea.  Concerning for pancreatic mass, biliary obstruction, other.  Hold Topamax for now.  Labs ordered to rule out any hemolytic component.  Will ask GI to see.  MRCP ordered.  Repeat labs for today.  Consider Questran for alleviation of pruritus  2.  Hyperthyroidism.  Was on Tapazole.  Likely not the cause of jaundice.  Discussed earlier with Dr. Golden.  3.  Atrial fibrillation.  Rate is controlled.  EKG ordered.  Not on anticoagulation  4.  Sleep apnea, on CPAP  5.  Bipolar disorder.  Hold Topamax as noted above    I discussed the patients findings and my recommendations with patient and nursing staff.  Discussed earlier with Dr. Diaz.  Medical record reviewed      Laurence Samayoa MD  [facility]  02/11/18  2:14 PM

## 2018-02-11 NOTE — CONSULTS
62 y.o.  Patient Care Team:  Aubrey Pitts MD as PCP - General (Family Medicine)  Bright Rodriguez MD as Consulting Physician (Psychiatry)      No chief complaint on file.  Hyperthyroidism on medication and jaundice new-onset    HPI patient is a 62-year-old white female with a past medical history of hyperthyroidism on methimazole 10 mg daily admitted to the hospital with severe jaundice with bilirubin level greater than 17  Patient reported that for the past week or so she has been feeling fatigued with nausea and anorexia  She denied any fever or chills  No vomiting or diarrhea  Patient apparently was seen by her psychiatrist for bipolar illness within the past few days and was noted to be jaundiced and was advised to seek further help.  Patient did not seek any primary care appointment  She call my office and reported above.  Patient is advised to get lab testing done urgently.  She came to the hospital said he morning and at the labs drawn.  I was called with a critical value bilirubin greater than 17.  Her AST ALT but only mildly elevated  Alkaline phosphatase was also mildly elevated  Patient is also reported mild itching for the past few days  I discussed with the patient over the phone and advised her to seek hospitalization for further evaluation.  Patient preferred to wait until today in the morning and wanted direct admission to the hospital    I discussed with the hospitalist on call and admitted the patient for further evaluation by gastroenterology towards etiology of jaundice  I advised the patient to discontinue methimazole even though I suspect her jaundice appears to be more cholestatic and an obstructive etiology must be ruled out    Patient was admitted to months ago with similar symptoms of nausea and anorexia and weight loss particularly evaluation at that time was negative except for hyperthyroidism status.  Patient was started on methimazole and was sent home.  At that time  Thyroid  ultrasound from December 2017 revealed a multinodular goiter.        Past Medical History:   Diagnosis Date   • Abnormal weight loss    • Arthritis     OSTEOARTHRITIS RIGHT KNEE AND INDEX FINGERS   • Atrial fibrillation    • Atrial fibrillation with RVR    • Bipolar disorder    • Cholelithiasis     uncompllicated   • Depression    • Hyperkalemia    • Hyperthyroidism 12/12/2017   • Hypokalemia    • Mild mitral regurgitation    • Mild tricuspid regurgitation    • Nausea and vomiting    • BABRIE (obstructive sleep apnea)    • Sleep apnea    • Uterine mass     muliple masses, 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       Family History   Problem Relation Age of Onset   • Heart disease Mother    • Hypertension Mother    • Depression Mother    • Cancer Father    • Depression Sister    • Depression Brother    • Depression Brother    • Depression Brother    • Depression Sister    • Vision loss Maternal Grandmother    • Vision loss Paternal Grandmother    • Breast cancer Maternal Aunt    • Breast cancer Maternal Aunt        Social History     Social History   • Marital status: Single     Spouse name: N/A   • Number of children: N/A   • Years of education: N/A     Occupational History   • Not on file.     Social History Main Topics   • Smoking status: Former Smoker     Packs/day: 2.00     Years: 26.00   • Smokeless tobacco: Never Used   • Alcohol use No   • Drug use: No   • Sexual activity: No     Other Topics Concern   • Not on file     Social History Narrative       No Known Allergies      Current Facility-Administered Medications:   •  clonazePAM (KlonoPIN) tablet 0.5 mg, 0.5 mg, Oral, BID PRN, Laurence Samayoa MD  •  diltiaZEM CD (CARDIZEM CD) 24 hr capsule 120 mg, 120 mg, Oral, Q24H, Laurence Samayoa MD, 120 mg at 02/11/18 1600  •  enoxaparin (LOVENOX) syringe 40 mg, 40 mg, Subcutaneous, Q24H, Laurence Samayoa MD, 40 mg at 02/11/18 1559  •  metoprolol succinate XL (TOPROL-XL) 24 hr tablet 50 mg, 50 mg, Oral, Daily, Laurence Samayoa MD  •  morphine  injection 1 mg, 1 mg, Intravenous, Q4H PRN **AND** naloxone (NARCAN) injection 0.4 mg, 0.4 mg, Intravenous, Q5 Min PRN, Laurence Samayoa MD  •  ondansetron (ZOFRAN) injection 4 mg, 4 mg, Intravenous, Q4H PRN, Laurence Samayoa MD, 4 mg at 02/11/18 1344  •  sodium chloride 0.9 % flush 1-10 mL, 1-10 mL, Intravenous, PRN, Laurence Samayoa MD  •  sodium chloride 0.9 % with KCl 20 mEq/L infusion, 100 mL/hr, Intravenous, Continuous, Laurence Samayoa MD, Last Rate: 100 mL/hr at 02/11/18 1607, 100 mL/hr at 02/11/18 1607  •  traMADol (ULTRAM) tablet 50 mg, 50 mg, Oral, Q6H PRN, Laurence Samayoa MD  •  zolpidem (AMBIEN) tablet 5 mg, 5 mg, Oral, Nightly PRN, Laurence Samayoa MD         Review of Systems   Constitutional: Positive for appetite change and fatigue.   Eyes: Negative.    Respiratory: Negative.    Cardiovascular: Negative.    Gastrointestinal: Positive for abdominal distention and nausea.   Endocrine: Negative.    Musculoskeletal: Negative.    Skin:        itching   Neurological: Positive for dizziness.   Psychiatric/Behavioral: Negative.    All other systems reviewed and are negative.    Objective     Vital Signs  Temp:  [97.4 °F (36.3 °C)-98 °F (36.7 °C)] 98 °F (36.7 °C)  Heart Rate:  [] 118  Resp:  [16] 16  BP: (120-124)/(78-82) 124/78    Physical Exam  Physical Exam   Constitutional: She is oriented to person, place, and time. She appears well-developed and well-nourished.   HENT:   Icterus noted   Eyes: EOM are normal. Pupils are equal, round, and reactive to light.   Neck: Normal range of motion. Neck supple.   Cardiovascular: Normal rate, regular rhythm, normal heart sounds and intact distal pulses.    Pulmonary/Chest: Effort normal and breath sounds normal.   Abdominal: Soft. Bowel sounds are normal. She exhibits distension. There is no tenderness.   Musculoskeletal: Normal range of motion. She exhibits no edema.   Neurological: She is alert and oriented to person, place, and time.   Skin: Skin is warm and  dry.   Psychiatric: She has a normal mood and affect. Her behavior is normal.   Nursing note and vitals reviewed.      Results Review:    I reviewed the patient's new clinical results.  No results found for: POCGLU  Lab Results (last 72 hours)     Procedure Component Value Units Date/Time    CBC (No Diff) [[id number]]  (Abnormal) Collected:  02/11/18 1209    Specimen:  Blood Updated:  02/11/18 1254     WBC 11.12 (H) 10*3/mm3      RBC 4.59 10*6/mm3      Hemoglobin 13.0 g/dL      Hematocrit 38.9 %      MCV 84.7 fL      MCH 28.3 pg      MCHC 33.4 g/dL      RDW 19.1 (H) %      RDW-SD 58.4 (H) fl      MPV 11.1 fL      Platelets 350 10*3/mm3     Haptoglobin [[id number]]  (Normal) Collected:  02/11/18 1209    Specimen:  Blood Updated:  02/11/18 1317     Haptoglobin 145 mg/dL     Lactate Dehydrogenase [[id number]]  (Normal) Collected:  02/11/18 1209    Specimen:  Blood Updated:  02/11/18 1318      U/L     Comprehensive Metabolic Panel [[id number]]  (Abnormal) Collected:  02/11/18 1209    Specimen:  Blood Updated:  02/11/18 1336     Glucose 100 (H) mg/dL      BUN 19 mg/dL      Creatinine 0.82 mg/dL      Sodium 141 mmol/L      Potassium 3.4 (L) mmol/L      Chloride 102 mmol/L      CO2 21.4 (L) mmol/L      Calcium 9.3 mg/dL      Total Protein 6.6 g/dL      Albumin 3.50 g/dL      ALT (SGPT) 58 (H) U/L      AST (SGOT) 62 (H) U/L      Alkaline Phosphatase 253 (H) U/L      Total Bilirubin 18.8 (H) mg/dL      eGFR Non African Amer 70 mL/min/1.73      Globulin 3.1 gm/dL      A/G Ratio 1.1 g/dL      BUN/Creatinine Ratio 23.2     Anion Gap 17.6 mmol/L     Bilirubin, Direct [[id number]]  (Abnormal) Collected:  02/11/18 1209    Specimen:  Blood Updated:  02/11/18 1338     Bilirubin, Direct >10.0 (H) mg/dL           Imaging Results (last 72 hours)     Procedure Component Value Units Date/Time    MRI abdomen w wo contrast mrcp [[id number]] Updated:  02/11/18 1502          Assessment/Plan     Patient Active Problem List   Diagnosis   •  "Bipolar I disorder, single manic episode   • Acid reflux   • HLD (hyperlipidemia)   • LBP (low back pain)   • PAF (paroxysmal atrial fibrillation)   • BARBIE (obstructive sleep apnea)   • Nausea & vomiting   • Hypokalemia   • Abnormal weight loss   • Hyperthyroidism   • Visual changes   • Nontoxic multinodular goiter   • Jaundice   • Anorexia     History of hyperthyroidism  Multinodular goiter  Obstructive jaundice  History of cholestasis    Patient's current presentation does not suggest drug-induced hepatitis/cholestasis.  Even then I advised the patient to discontinue methimazole  Patient denied any other symptoms from methimazole    Patient has history of cholestasis and gallbladder sludge in the past which needs to be  Evaluated  Gastroenterology is following the patient  Patient just had MRCP and the results are pending    Will continue to monitor patient  Will also continue to monitor the thyroid function  Patient will stop methimazole at this point    I discussed my plan with the patient she verbalized understanding  I also discussed with Dr. Laurence Samayoa    The total time spent for old record and lab review and floor time was more than 110 min of which greater than 60 min of time was spent on counseling the patient on recommended evaluation and treatment options, instructions for management/treatment and /or follow up  and importance of compliance with chosen management or treatment options    Roman Pop MD F[facility].  02/11/18  5:14 PM        EMR Dragon / transcription disclaimer:     \"Dictated utilizing Dragon dictation\".   "

## 2018-02-12 LAB
ALBUMIN SERPL-MCNC: 3.1 G/DL (ref 3.5–5.2)
ALBUMIN/GLOB SERPL: 1.3 G/DL
ALP SERPL-CCNC: 185 U/L (ref 39–117)
ALT SERPL W P-5'-P-CCNC: 45 U/L (ref 1–33)
ANION GAP SERPL CALCULATED.3IONS-SCNC: 14.3 MMOL/L
AST SERPL-CCNC: 50 U/L (ref 1–32)
BILIRUB SERPL-MCNC: 14.2 MG/DL (ref 0.1–1.2)
BUN BLD-MCNC: 17 MG/DL (ref 8–23)
BUN/CREAT SERPL: 23.3 (ref 7–25)
CALCIUM SPEC-SCNC: 8.4 MG/DL (ref 8.6–10.5)
CHLORIDE SERPL-SCNC: 105 MMOL/L (ref 98–107)
CO2 SERPL-SCNC: 21.7 MMOL/L (ref 22–29)
CREAT BLD-MCNC: 0.73 MG/DL (ref 0.57–1)
DEPRECATED RDW RBC AUTO: 58.4 FL (ref 37–54)
ERYTHROCYTE [DISTWIDTH] IN BLOOD BY AUTOMATED COUNT: 18.8 % (ref 11.7–13)
GFR SERPL CREATININE-BSD FRML MDRD: 81 ML/MIN/1.73
GLOBULIN UR ELPH-MCNC: 2.3 GM/DL
GLUCOSE BLD-MCNC: 93 MG/DL (ref 65–99)
HCT VFR BLD AUTO: 34.8 % (ref 35.6–45.5)
HGB BLD-MCNC: 11.1 G/DL (ref 11.9–15.5)
INR PPP: 1.16 (ref 0.9–1.1)
MCH RBC QN AUTO: 27.8 PG (ref 26.9–32)
MCHC RBC AUTO-ENTMCNC: 31.9 G/DL (ref 32.4–36.3)
MCV RBC AUTO: 87.2 FL (ref 80.5–98.2)
PLATELET # BLD AUTO: 275 10*3/MM3 (ref 140–500)
PMV BLD AUTO: 10.7 FL (ref 6–12)
POTASSIUM BLD-SCNC: 3.8 MMOL/L (ref 3.5–5.2)
PROT SERPL-MCNC: 5.4 G/DL (ref 6–8.5)
PROTHROMBIN TIME: 14.6 SECONDS (ref 11.7–14.2)
RBC # BLD AUTO: 3.99 10*6/MM3 (ref 3.9–5.2)
SODIUM BLD-SCNC: 141 MMOL/L (ref 136–145)
WBC NRBC COR # BLD: 8.15 10*3/MM3 (ref 4.5–10.7)

## 2018-02-12 PROCEDURE — 99226 PR SBSQ OBSERVATION CARE/DAY 35 MINUTES: CPT | Performed by: INTERNAL MEDICINE

## 2018-02-12 PROCEDURE — 99232 SBSQ HOSP IP/OBS MODERATE 35: CPT | Performed by: INTERNAL MEDICINE

## 2018-02-12 PROCEDURE — 96361 HYDRATE IV INFUSION ADD-ON: CPT

## 2018-02-12 PROCEDURE — 96376 TX/PRO/DX INJ SAME DRUG ADON: CPT

## 2018-02-12 PROCEDURE — 96372 THER/PROPH/DIAG INJ SC/IM: CPT

## 2018-02-12 PROCEDURE — 96375 TX/PRO/DX INJ NEW DRUG ADDON: CPT

## 2018-02-12 PROCEDURE — 25010000002 ENOXAPARIN PER 10 MG: Performed by: INTERNAL MEDICINE

## 2018-02-12 PROCEDURE — 99222 1ST HOSP IP/OBS MODERATE 55: CPT | Performed by: INTERNAL MEDICINE

## 2018-02-12 PROCEDURE — 25010000002 ONDANSETRON PER 1 MG: Performed by: INTERNAL MEDICINE

## 2018-02-12 PROCEDURE — 85027 COMPLETE CBC AUTOMATED: CPT | Performed by: INTERNAL MEDICINE

## 2018-02-12 PROCEDURE — 25010000002 DIPHENHYDRAMINE PER 50 MG: Performed by: INTERNAL MEDICINE

## 2018-02-12 PROCEDURE — 85610 PROTHROMBIN TIME: CPT | Performed by: INTERNAL MEDICINE

## 2018-02-12 PROCEDURE — G0378 HOSPITAL OBSERVATION PER HR: HCPCS

## 2018-02-12 PROCEDURE — 80053 COMPREHEN METABOLIC PANEL: CPT | Performed by: INTERNAL MEDICINE

## 2018-02-12 RX ORDER — DIPHENHYDRAMINE HYDROCHLORIDE 50 MG/ML
25 INJECTION INTRAMUSCULAR; INTRAVENOUS EVERY 6 HOURS PRN
Status: DISCONTINUED | OUTPATIENT
Start: 2018-02-12 | End: 2018-02-13 | Stop reason: HOSPADM

## 2018-02-12 RX ORDER — METOPROLOL SUCCINATE 100 MG/1
100 TABLET, EXTENDED RELEASE ORAL DAILY
Status: DISCONTINUED | OUTPATIENT
Start: 2018-02-13 | End: 2018-02-13 | Stop reason: HOSPADM

## 2018-02-12 RX ADMIN — ZOLPIDEM TARTRATE 5 MG: 5 TABLET ORAL at 22:00

## 2018-02-12 RX ADMIN — METOPROLOL SUCCINATE 50 MG: 50 TABLET, FILM COATED, EXTENDED RELEASE ORAL at 08:40

## 2018-02-12 RX ADMIN — DIPHENHYDRAMINE HYDROCHLORIDE 25 MG: 50 INJECTION, SOLUTION INTRAMUSCULAR; INTRAVENOUS at 02:29

## 2018-02-12 RX ADMIN — ONDANSETRON 4 MG: 2 INJECTION INTRAMUSCULAR; INTRAVENOUS at 05:48

## 2018-02-12 RX ADMIN — DILTIAZEM HYDROCHLORIDE 120 MG: 120 CAPSULE, COATED, EXTENDED RELEASE ORAL at 08:39

## 2018-02-12 RX ADMIN — ENOXAPARIN SODIUM 40 MG: 40 INJECTION SUBCUTANEOUS at 12:54

## 2018-02-12 RX ADMIN — DIPHENHYDRAMINE HYDROCHLORIDE 25 MG: 50 INJECTION, SOLUTION INTRAMUSCULAR; INTRAVENOUS at 21:59

## 2018-02-12 NOTE — PROGRESS NOTES
62 y.o.   LOS: 1 day   Patient Care Team:  Aubrey Pitts MD as PCP - General (Family Medicine)  Bright Rodriguez MD as Consulting Physician (Psychiatry)    Chief Complaint:  Hypothyroidism and jaundice    No chief complaint on file.      Subjective     HPI  Patient is currently under evaluation by gastroenterology  The etiology is still not clear but possibly medication related  Methimazole versus psych medication is still under consideration  Patient is feeling better  Her bilirubin level is coming down  She has a good appetite and is able to eat    Interval History:    Hyperthyroidism on medication and jaundice new-onset     HPI patient is a 62-year-old white female with a past medical history of hyperthyroidism on methimazole 10 mg daily admitted to the hospital with severe jaundice with bilirubin level greater than 17  Patient reported that for the past week or so she has been feeling fatigued with nausea and anorexia  She denied any fever or chills  No vomiting or diarrhea  Patient apparently was seen by her psychiatrist for bipolar illness within the past few days and was noted to be jaundiced and was advised to seek further help.  Patient did not seek any primary care appointment  She call my office and reported above.  Patient is advised to get lab testing done urgently.  She came to the hospital said he morning and at the labs drawn.  I was called with a critical value bilirubin greater than 17.  Her AST ALT but only mildly elevated  Alkaline phosphatase was also mildly elevated  Patient is also reported mild itching for the past few days  I discussed with the patient over the phone and advised her to seek hospitalization for further evaluation.  Patient preferred to wait until today in the morning and wanted direct admission to the hospital     I discussed with the hospitalist on call and admitted the patient for further evaluation by gastroenterology towards etiology of jaundice  I advised the patient  to discontinue methimazole even though I suspect her jaundice appears to be more cholestatic and an obstructive etiology must be ruled out     Patient was admitted to months ago with similar symptoms of nausea and anorexia and weight loss particularly evaluation at that time was negative except for hyperthyroidism status.  Patient was started on methimazole and was sent home.  At that time  Thyroid ultrasound from December 2017 revealed a multinodular goiter.  Review of Systems:      Review of Systems   Constitutional: Positive for fatigue. Negative for appetite change.   Respiratory: Negative.    Cardiovascular: Negative.    Gastrointestinal: Negative.    Skin: Positive for color change. Negative for rash.   Neurological: Negative.    Psychiatric/Behavioral: Negative.    All other systems reviewed and are negative.    Objective     Vital Signs   Temp:  [97.4 °F (36.3 °C)-98.1 °F (36.7 °C)] 97.7 °F (36.5 °C)  Heart Rate:  [] 86  Resp:  [16] 16  BP: ()/(61-82) 99/66    Physical Exam:  Physical Exam   Constitutional: She is oriented to person, place, and time.   Eyes: EOM are normal. Pupils are equal, round, and reactive to light.   Icteric conjunctiva   Neck: Normal range of motion. Neck supple. No thyromegaly present.   Cardiovascular: Normal rate, regular rhythm, normal heart sounds and intact distal pulses.    Pulmonary/Chest: Effort normal and breath sounds normal.   Abdominal: Soft. Bowel sounds are normal. She exhibits distension. There is no tenderness.   Musculoskeletal: Normal range of motion. She exhibits no edema.   Neurological: She is alert and oriented to person, place, and time.   Skin: Skin is warm and dry.   icteric   Psychiatric: She has a normal mood and affect. Her behavior is normal.   Nursing note and vitals reviewed.  Results Review:     I reviewed the patient's new clinical results.      Glucose   Date/Time Value Ref Range Status   02/12/2018 0456 93 65 - 99 mg/dL Final    02/11/2018 1209 100 (H) 65 - 99 mg/dL Final   02/10/2018 0935 129 (H) 65 - 99 mg/dL Final     Lab Results (last 72 hours)     Procedure Component Value Units Date/Time    CBC (No Diff) [[id number]]  (Abnormal) Collected:  02/11/18 1209    Specimen:  Blood Updated:  02/11/18 1254     WBC 11.12 (H) 10*3/mm3      RBC 4.59 10*6/mm3      Hemoglobin 13.0 g/dL      Hematocrit 38.9 %      MCV 84.7 fL      MCH 28.3 pg      MCHC 33.4 g/dL      RDW 19.1 (H) %      RDW-SD 58.4 (H) fl      MPV 11.1 fL      Platelets 350 10*3/mm3     Haptoglobin [[id number]]  (Normal) Collected:  02/11/18 1209    Specimen:  Blood Updated:  02/11/18 1317     Haptoglobin 145 mg/dL     Lactate Dehydrogenase [[id number]]  (Normal) Collected:  02/11/18 1209    Specimen:  Blood Updated:  02/11/18 1318      U/L     Comprehensive Metabolic Panel [[id number]]  (Abnormal) Collected:  02/11/18 1209    Specimen:  Blood Updated:  02/11/18 1336     Glucose 100 (H) mg/dL      BUN 19 mg/dL      Creatinine 0.82 mg/dL      Sodium 141 mmol/L      Potassium 3.4 (L) mmol/L      Chloride 102 mmol/L      CO2 21.4 (L) mmol/L      Calcium 9.3 mg/dL      Total Protein 6.6 g/dL      Albumin 3.50 g/dL      ALT (SGPT) 58 (H) U/L      AST (SGOT) 62 (H) U/L      Alkaline Phosphatase 253 (H) U/L      Total Bilirubin 18.8 (H) mg/dL      eGFR Non African Amer 70 mL/min/1.73      Globulin 3.1 gm/dL      A/G Ratio 1.1 g/dL      BUN/Creatinine Ratio 23.2     Anion Gap 17.6 mmol/L     Bilirubin, Direct [[id number]]  (Abnormal) Collected:  02/11/18 1209    Specimen:  Blood Updated:  02/11/18 1338     Bilirubin, Direct >10.0 (H) mg/dL     Protime-INR [[id number]]  (Abnormal) Collected:  02/12/18 0456    Specimen:  Blood Updated:  02/12/18 0622     Protime 14.6 (H) Seconds      INR 1.16 (H)    CBC (No Diff) [[id number]]  (Abnormal) Collected:  02/12/18 0456    Specimen:  Blood Updated:  02/12/18 0625     WBC 8.15 10*3/mm3      RBC 3.99 10*6/mm3      Hemoglobin 11.1 (L) g/dL       Hematocrit 34.8 (L) %      MCV 87.2 fL      MCH 27.8 pg      MCHC 31.9 (L) g/dL      RDW 18.8 (H) %      RDW-SD 58.4 (H) fl      MPV 10.7 fL      Platelets 275 10*3/mm3     Comprehensive Metabolic Panel [[id number]]  (Abnormal) Collected:  02/12/18 0456    Specimen:  Blood Updated:  02/12/18 0643     Glucose 93 mg/dL      BUN 17 mg/dL      Creatinine 0.73 mg/dL      Sodium 141 mmol/L      Potassium 3.8 mmol/L      Chloride 105 mmol/L      CO2 21.7 (L) mmol/L      Calcium 8.4 (L) mg/dL      Total Protein 5.4 (L) g/dL      Albumin 3.10 (L) g/dL      ALT (SGPT) 45 (H) U/L      AST (SGOT) 50 (H) U/L      Alkaline Phosphatase 185 (H) U/L      Total Bilirubin 14.2 (H) mg/dL      eGFR Non African Amer 81 mL/min/1.73      Globulin 2.3 gm/dL      A/G Ratio 1.3 g/dL      BUN/Creatinine Ratio 23.3     Anion Gap 14.3 mmol/L         Imaging Results (last 72 hours)     Procedure Component Value Units Date/Time    MRI abdomen w wo contrast mrcp [[id number]] Updated:  02/11/18 1502          Medication Review:       Current Facility-Administered Medications:   •  clonazePAM (KlonoPIN) tablet 0.5 mg, 0.5 mg, Oral, BID PRN, Laurence Samayoa MD  •  diltiaZEM CD (CARDIZEM CD) 24 hr capsule 120 mg, 120 mg, Oral, Q24H, Laurence Samayoa MD, 120 mg at 02/11/18 1600  •  diphenhydrAMINE (BENADRYL) injection 25 mg, 25 mg, Intravenous, Q6H PRN, Derek Armstrong MD, 25 mg at 02/12/18 0229  •  enoxaparin (LOVENOX) syringe 40 mg, 40 mg, Subcutaneous, Q24H, Laurence Samayoa MD, 40 mg at 02/11/18 1559  •  metoprolol succinate XL (TOPROL-XL) 24 hr tablet 50 mg, 50 mg, Oral, Daily, Laurence Samayoa MD, 50 mg at 02/11/18 1809  •  morphine injection 1 mg, 1 mg, Intravenous, Q4H PRN **AND** naloxone (NARCAN) injection 0.4 mg, 0.4 mg, Intravenous, Q5 Min PRN, Laurence Samayoa MD  •  ondansetron (ZOFRAN) injection 4 mg, 4 mg, Intravenous, Q4H PRN, Laurence Samayoa MD, 4 mg at 02/12/18 8587  •  sodium chloride 0.9 % flush 1-10 mL, 1-10 mL, Intravenous, PRN,  Laurence Samayoa MD  •  sodium chloride 0.9 % with KCl 20 mEq/L infusion, 100 mL/hr, Intravenous, Continuous, Laurence Samayoa MD, Last Rate: 100 mL/hr at 02/12/18 0548, 100 mL/hr at 02/12/18 0548  •  traMADol (ULTRAM) tablet 50 mg, 50 mg, Oral, Q6H PRN, Laurence Samayoa MD  •  zolpidem (AMBIEN) tablet 5 mg, 5 mg, Oral, Nightly PRN, Laurence Samayoa MD, 5 mg at 02/11/18 7171    Assessment/Plan     Patient Active Problem List   Diagnosis   • Bipolar I disorder, single manic episode   • Acid reflux   • HLD (hyperlipidemia)   • LBP (low back pain)   • PAF (paroxysmal atrial fibrillation)   • BARBIE (obstructive sleep apnea)   • Nausea & vomiting   • Hypokalemia   • Abnormal weight loss   • Hyperthyroidism   • Visual changes   • Nontoxic multinodular goiter   • Jaundice   • Anorexia     History of hyperthyroidism  Multinodular goiter  Obstructive jaundice  History of cholestasis     Patient's current presentation does not suggest drug-induced hepatitis/cholestasis.  Even then I advised the patient to discontinue methimazole  Patient denied any other symptoms from methimazole     Patient has history of cholestasis and gallbladder sludge in the past which needs to be  Evaluated  Gastroenterology is following the patient  Patient just had MRCP and the results are pending     Patient's liver function is improving  Bilirubin has come down to 14  AST ALT is also improving  Etiology is still unclear but patient has discontinued methimazole for the past several days  GI evaluation is still pending     I discussed my plan with the patient she verbalized understanding  I also discussed with Dr. Laurence Samayoa    The total time spent for old record and lab review and floor time was more than 35 min of which greater than 20 min of time was spent on counseling the patient on recommended evaluation and treatment options, instructions for management/treatment and /or follow up  and importance of compliance with chosen management or treatment  "options    Roman Pop MD FACE.  02/12/18  8:04 AM      EMR Dragon / transcription disclaimer:     \"Dictated utilizing Dragon dictation\".   "

## 2018-02-12 NOTE — PROGRESS NOTES
"        BGA/GI Progress Note   Chief Complaint:  Jaundice, elevated LFT\"s    Subjective     Interval History: MRCP pending.  No abdominal pain, tolerating regular diet.  Did have some n/v the last two mornings, none this am.  Will call her PCP to determine name of antibx recently taken.    History taken from: patient chart    Review of Systems:    All systems were reviewed and negative except for:  Cardiovascular: positive for  irregular pulse  Gastrointestinal: postitive for  jaundice    Objective     Vital Signs  Temp:  [97.7 °F (36.5 °C)-98.1 °F (36.7 °C)] 97.7 °F (36.5 °C)  Heart Rate:  [] 86  Resp:  [16] 16  BP: ()/(61-82) 99/66  Body mass index is 39.38 kg/(m^2).    Intake/Output Summary (Last 24 hours) at 02/12/18 1159  Last data filed at 02/12/18 0900   Gross per 24 hour   Intake             1495 ml   Output                0 ml   Net             1495 ml     I/O this shift:  In: 240 [P.O.:240]  Out: -     Physical Exam:   General: patient awake, alert and cooperative   Eyes: Normal lids and lashes, scleral icterus   Neck: supple, normal ROM, no tracheal deviation, no thyromegaly   Skin: warm and dry,  jaundiced   Cardiovascular: irregular rhythm and rate, no murmurs auscultated   Pulm: clear to auscultation bilaterally, regular and unlabored   Abdomen: obese, round, soft, nontender, nondistended; normal bowel sounds   Rectal: deferred   Extremities: no rash or edema   Neurologic: Normal mood and behavior    All Medications Have Been Reviewed     Results Review:       Results from last 7 days  Lab Units 02/12/18  0456 02/11/18  1209 02/10/18  0935   WBC 10*3/mm3 8.15 11.12* 10.66   HEMOGLOBIN g/dL 11.1* 13.0 13.3   HEMATOCRIT % 34.8* 38.9 38.7   PLATELETS 10*3/mm3 275 350 302         Results from last 7 days  Lab Units 02/12/18  0456 02/11/18  1209 02/10/18  0935   SODIUM mmol/L 141 141 141   POTASSIUM mmol/L 3.8 3.4* 3.6   CHLORIDE mmol/L 105 102 103   CO2 mmol/L 21.7* 21.4* 22.6   BUN mg/dL 17 19 " 15   CREATININE mg/dL 0.73 0.82 0.98   CALCIUM mg/dL 8.4* 9.3 9.6   BILIRUBIN mg/dL 14.2* 18.8* 19.9*   ALK PHOS U/L 185* 253* 257*   ALT (SGPT) U/L 45* 58* 59*   AST (SGOT) U/L 50* 62* 65*   GLUCOSE mg/dL 93 100* 129*         Results from last 7 days  Lab Units 02/12/18  0456   INR  1.16*       RADIOLOGY:    Imaging Results (last 72 hours)     Procedure Component Value Units Date/Time    MRI abdomen w wo contrast mrcp [[id number]] Updated:  02/11/18 1502          Assessment/Plan     Patient Active Problem List   Diagnosis Code   • Bipolar I disorder, single manic episode F30.9   • Acid reflux K21.9   • HLD (hyperlipidemia) E78.5   • LBP (low back pain) M54.5   • PAF (paroxysmal atrial fibrillation) I48.0   • BARBIE (obstructive sleep apnea) G47.33   • Nausea & vomiting R11.2   • Hypokalemia E87.6   • Abnormal weight loss R63.4   • Hyperthyroidism E05.90   • Visual changes H53.9   • Nontoxic multinodular goiter E04.2   • Jaundice R17   • Anorexia R63.0     Cyndy Aguirre, APRN  02/12/18  11:59 AM          We are following for elevated liver tests  Liver numbers are improving    Constitutional: She is oriented to person, place, and time. She appears well-developed and well-nourished.   HENT: anicteric, no thyromegaly  Head: Normocephalic and atraumatic.   Eyes: Conjunctivae and EOM are normal.   Neck: Normal range of motion. No tracheal deviation present. Cardiovascular: Normal rate and regular rhythm.    Pulmonary/Chest: Effort normal and breath sounds normal. No respiratory distress.   Abdominal: Soft. Bowel sounds are normal. She exhibits no distension and no mass. There is no tenderness. There is no rebound and no guarding.   Musculoskeletal: Normal range of motion.   Neurological: She is alert and oriented to person, place, and time.   Skin: Skin is warm and dry.   Psychiatric: She has a normal mood and affect. Judgment normal.   Nursing note and vitals reviewed.    Assessment:  1.  Jaundice-no evidence of bile  duct obstruction  2.  Elevated LFTs- improving  3.  Cholelithiasis  4. A fib     MRCP without choledocholithiasis    Drug-induced liver injury can cause this pattern of liver injury.   she is waiting for a phone call back from her primary care doctor.  This certainly could be drug-induced liver injury with intrahepatic cholestasis.  We will follow

## 2018-02-12 NOTE — CONSULTS
"Adult Nutrition  Assessment/PES    Patient Name:  Bowen Concepcion  YOB: 1955  MRN: [id number]  Admit Date:  2/11/2018    Assessment Date:  2/12/2018    Comments:  Consult d/t MST score of 5 per nurse admission screen.  Patient with fairly good appetite currently.  75% x 2 meals recorded per chart PO data since admission.  She reports taste changes, can't do spicy or salty foods anymore, does not tolerate chocolate or caffeine.  Reports problems with hyperthyroidism and has now developed jaundice, being w/u for this.  MRCP done today.    Reports 60# weight loss (21.8%) since September or October 2017.  MSA completed for moderate malnutrition.    Patient reports she is eating well here and tolerating food better here than at home.  Will continue to follow for intake.          Reason for Assessment       02/12/18 1337    Reason for Assessment    Reason For Assessment/Visit admission assessment;identified at risk by screening criteria;nurse/nurse practitioner consult    Identified At Risk By Screening Criteria MST SCORE 2+;unintentional loss of 10 lbs or more in the past 2 mos    Diagnosis Diagnosis    Cardiac PAF    Endocrine Hyperthyroid    Hepatic Jaundice    Ortho Osteoarthritis    Psychosocial Depression;Other (comment)   bipolar    Pulmonary/Critical Care Obstructive sleep apnea              Nutrition/Diet History       02/12/18 1344    Nutrition/Diet History    Typical Food/Fluid Intake reports taste changes, some foods bother her more now than they used to; viotr better here so far, eating well, tolerating; early satiety            Anthropometrics       02/12/18 1345    Anthropometrics    Height 157.5 cm (62.01\")    Weight 97.7 kg (215 lb 6.2 oz)    RD Documented Current Weight  97.7 kg (215 lb 6.2 oz)    Anthropometrics (Special Considerations)    RD Calculated BMI (kg/m2) 39.37    Ideal Body Weight (IBW)    Ideal Body Weight (IBW), Female 50.85    % Ideal Body Weight 192.53    Usual Body Weight " "(UBW)    Usual Body Weight 125 kg (275 lb)    % Usual Body Weight 78.32    % Usual Body Weight Malnutrition 70-79% -moderate deficit    Weight Loss 27.2 kg (60 lb)    % Weight Loss  21.8 %    Weight Loss Time Frame 4-5 months    Body Mass Index (BMI)    BMI (kg/m2) 39.47    BMI Grade 35 - 39.9 - obesity - grade II            Labs/Tests/Procedures/Meds       02/12/18 1347    Labs/Tests/Procedures/Meds    Diagnostic Test/Procedure Review reviewed, pertinent    Labs/Tests Review Reviewed;CO2;AlkP;ALT;AST;Alb;Hgb Hct    Procedure Review Other (comment)   MRCP today    Medication Review Reviewed, pertinent    Significant Vitals reviewed, pertinent            Physical Findings       02/12/18 1347    Physical Findings/Assessment    Additional Documentation Physical Appearance (Group)    Physical Appearance    Overall Physical Appearance obese;loss of subcutaneous fat;loss of muscle mass    Skin jaundice   B=21, intact            Estimated/Assessed Needs       02/12/18 1349    Calculation Measurements    Weight Used For Calculations 97.7 kg (215 lb 6.2 oz)    Height Used for Calculations 1.575 m (5' 2.01\")    Estimated/Assessed Energy Needs    Energy Need Method Kcal/kg    kcal/kg 25    25 Kcal/Kg (kcal) 2442.5    Estimated Kcal Range  1955    Estimated/Assessed Protein Needs    Weight Used for Protein Calculation 97.7 kg (215 lb 6.2 oz)    Protein (gm/kg) 1.0    1.0 Gm Protein (gm) 97.7    Estimated Protein Range 98    Estimated/Assessed Fluid Needs    Fluid Need Method RDA method    RDA Method (mL)  2443            Nutrition Prescription Ordered       02/12/18 1350    Nutrition Prescription PO    Current PO Diet Regular    Common Modifiers Low Fat            Evaluation of Received Nutrient/Fluid Intake       02/12/18 1350    PO Evaluation    Number of Meals 2    % PO Intake 75              Malnutrition Severity Assessment       02/12/18 1350    Malnutrition Severity Assessment    Malnutrition Type Chronic Illness " Malnutrition    Physical Signs of Malnutrition (Chronic)    Muscle Wasting Mild    Fat Loss Mild    Weight Status (Chronic)    %UBW Mod (75-85%)    Weight Loss Severe (>10% / 6 mo)    Energy Intake Status (Chronic)    Energy Intake Mod (<75% / > or equal to 1 mo)    Criteria Met (Must meet criteria for severity in at least 2 of these categories: M Wasting, Fat Loss, Fluid, Secondary Signs, Wt. Status, Intake)    Patient meets criteria for  Moderate malnutrition        Problem/Interventions:        Problem 1       02/12/18 1351    Nutrition Diagnoses Problem 1    Problem 1 Malnutrition    Etiology (related to) Medical Diagnosis;Factors Affecting Nutrition    Endocrine Hyperthyroid    Hepatic Jaundice    Appetite Fair    Reported GI Symptoms N & V    Signs/Symptoms (evidenced by) Report/Observation;Unintended Weight Change;% UBW    Percent (%) UBW 78.3 %    Unintended Weight Change Loss    Number of Pounds Lost 60    Weight loss time period 4-5 months    Reported/Observed By Patient                    Intervention Goal       02/12/18 0754    Intervention Goal    General Maintain nutrition;Disease management/therapy;Reduce/improve symptoms    PO Maintain intake    Weight No significant weight loss            Nutrition Intervention       02/12/18 8394    Nutrition Intervention    RD/Tech Action Follow Tx progress;Care plan reviewd;Encourage intake              Education/Evaluation       02/12/18 5424    Education    Education Will Instruct as appropriate    Monitor/Evaluation    Monitor Per protocol;PO intake;Pertinent labs;Weight;Symptoms    Education Follow-up Reinforce PRN        Electronically signed by:  Cami Gibson RD  02/12/18 1:55 PM

## 2018-02-12 NOTE — PROGRESS NOTES
Malnutrition Severity Assessment    Patient Name:  Bowen Concepcion  YOB: 1955  MRN: [id number]  Admit Date:  2/11/2018    Patient meets criteria for : Moderate malnutrition    Comments:  78.3% UBW, 60# weight loss (21.8%) x 4-5 months, decreased PO intake.    Moderate fat loss noted to orbital region.  Mild muscle wasting note to interosseous region and temporal region.    Malnutrition Type: Chronic Illness Malnutrition     Malnutrition Type (last 8 hours)      Malnutrition Severity Assessment       02/12/18 1350    Malnutrition Severity Assessment    Malnutrition Type Chronic Illness Malnutrition      02/12/18 1350    Physical Signs of Malnutrition (Chronic)    Muscle Wasting Mild    Fat Loss Mild      02/12/18 1350    Weight Status (Chronic)    %UBW Mod (75-85%)    Weight Loss Severe (>10% / 6 mo)      02/12/18 1350    Energy Intake Status (Chronic)    Energy Intake Mod (<75% / > or equal to 1 mo)      02/12/18 1350    Criteria Met (Must meet criteria for severity in at least 2 of these categories: M Wasting, Fat Loss, Fluid, Secondary Signs, Wt. Status, Intake)    Patient meets criteria for  Moderate malnutrition          Electronically signed by:  Cami Gibson RD  02/12/18 1:58 PM

## 2018-02-12 NOTE — CONSULTS
Patient Name: Bowen Concepcion  :1955  62 y.o.    Date of Admission: 2018  Date of Consultation:  18  Encounter Provider: Victoriano Murdock III, MD  Place of Service: [facility] CARDIOLOGY  Referring Provider: Laurence Samayoa MD  Patient Care Team:  Aubrey Pitts MD as PCP - General (Family Medicine)  Bright Rodriguez MD as Consulting Physician (Psychiatry)      Chief complaint: Jaundice     CHADS2-VASc score: 1    History of Present Illness:  Ms. Bowen Concepcion is a 62 year old female patient of mine. She has a history of PAF - metoprolol XL/cardizem, hyperthyroidism, and BARBIE - home o2. She is a former smoker. Her most recent ECHO was in 2017 which showed normal left ventricular function, right atrial cavity size was moderate-to-severely dilated, mild mitral valve regurgitation, and mild tricuspid valve regurgitation with an EF of 68%.     She was last seen in the office on 1/15/2018 for follow-up of cardiac standpoint. She was started on metoprolol and cardizem after presenting in a fib with hyperthyroidism during recent hospitalization. She returned to [facility]. She previously was on propranolol for anxiety. She reported that she felt better on propranolol and would like to be switched back. The plan was to see her back in 3 months. At that time she would be euthyroid, cardizem could then be discontinued and potentially switching her back to propranolol.    Patient was directly admitted with jaundice after seeing Dr. Diaz in office and presenting with a bilirubin of 19, AST 62 and ALT 58. She reported that her skin has been itchy. She had a Gallbladder ultrasound in  that showed cholelithiasis and sludge. Her Topamax is being held. MRI abdomen showed cholelithiasis but no intra or extrahepatic biliary dilatation and pancreatic lesions.     Patient was in atrial fibrillation with RVR on arrival.  She is currently receiving oral Cardizem and  metoprolol succinate.  She's currently on 40 mg Lovenox daily.  Evaluation for etiology of her markedly elevated total bilirubin is currently underway.  No therapeutic decisions have been made as yet.    She denies any chest pain, pressure, tightness, squeezing, or heartburn.  No shortness of breath at rest or with activity.     Previous Testing:     ECHO 12/10/17        Past Medical History:   Diagnosis Date   • Abnormal weight loss    • Arthritis     OSTEOARTHRITIS RIGHT KNEE AND INDEX FINGERS   • Atrial fibrillation    • Atrial fibrillation with RVR    • Bipolar disorder    • Cholelithiasis     uncompllicated   • Depression    • Hyperkalemia    • Hyperthyroidism 12/12/2017   • Hypokalemia    • Mild mitral regurgitation    • Mild tricuspid regurgitation    • Nausea and vomiting    • BARBIE (obstructive sleep apnea)    • Sleep apnea    • Uterine mass     muliple masses, 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       Past Surgical History:   Procedure Laterality Date   • COLONOSCOPY  01/01/2004         Prior to Admission medications    Medication Sig Start Date End Date Taking? Authorizing Provider   clonazePAM (KlonoPIN) 0.5 MG tablet Take 0.5 mg by mouth 2 (Two) Times a Day As Needed for Anxiety.   Yes Historical Provider, MD   aspirin 81 MG tablet Take 1 tablet by mouth Daily. 12/12/17   Bright Mar MD   Cholecalciferol (VITAMIN D) 2000 units capsule Take 2,000 Units by mouth Daily. 11/11/13   Historical Provider, MD   diltiaZEM CD (CARDIZEM CD) 120 MG 24 hr capsule Take 1 capsule by mouth Daily. 1/15/18 2/14/19  Victoriano Murdock III, MD   erythromycin (ROMYCIN) 5 MG/GM ophthalmic ointment Administer  to both eyes As Needed. 1/2/18   Historical Provider, MD   famotidine (PEPCID) 10 MG tablet Take 1 tablet by mouth 2 (Two) Times a Day As Needed for Heartburn. 12/20/17   Aubrey Pitts MD   metoprolol succinate XL (TOPROL-XL) 50 MG 24 hr tablet Take 1 tablet by mouth Daily. 1/15/18 2/14/19  Victoriano Murdock III, MD   topiramate  "(TOPAMAX) 100 MG tablet Take 100 mg by mouth 2 (Two) Times a Day. 10/14/13   Historical Provider, MD   zolpidem CR (AMBIEN CR) 12.5 MG CR tablet Take 12.5 mg by mouth At Night As Needed for Sleep.    Historical Provider, MD       No Known Allergies    Social History     Social History   • Marital status: Single     Spouse name: N/A   • Number of children: N/A   • Years of education: N/A     Social History Main Topics   • Smoking status: Former Smoker     Packs/day: 2.00     Years: 26.00   • Smokeless tobacco: Never Used   • Alcohol use No   • Drug use: No   • Sexual activity: No     Other Topics Concern   • Not on file     Social History Narrative       Family History   Problem Relation Age of Onset   • Heart disease Mother    • Hypertension Mother    • Depression Mother    • Cancer Father    • Depression Sister    • Depression Brother    • Depression Brother    • Depression Brother    • Depression Sister    • Vision loss Maternal Grandmother    • Vision loss Paternal Grandmother    • Breast cancer Maternal Aunt    • Breast cancer Maternal Aunt        REVIEW OF SYSTEMS:   All systems reviewed.  Pertinent positives identified in HPI.  All other systems are negative.      Objective:     Vitals:    02/12/18 0426 02/12/18 0700 02/12/18 1345 02/12/18 1429   BP:  99/66  103/60   BP Location:  Right arm  Left arm   Patient Position:  Lying  Sitting   Pulse: 102 86  102   Resp:  16  16   Temp:  97.7 °F (36.5 °C)  98.2 °F (36.8 °C)   TempSrc:  Oral  Oral   SpO2: 97% 95%  96%   Weight:   97.7 kg (215 lb 6.2 oz)    Height:   157.5 cm (62.01\")      Body mass index is 39.39 kg/(m^2).    General Appearance:    Alert, cooperative, in no acute distress   Head:    Normocephalic, without obvious abnormality, atraumatic   Eyes:            Lids and lashes normal, conjunctivae and sclerae normal, no   icterus, no pallor, corneas clear, PERRLA   Ears:    Ears appear intact with no abnormalities noted   Throat:   No oral lesions, no " thrush, oral mucosa moist   Neck:   No adenopathy, supple, trachea midline, no thyromegaly, no   carotid bruit, no JVD   Back:     No kyphosis present, no scoliosis present, no skin lesions, erythema or scars, no tenderness to percussion or palpation, range of motion normal   Lungs:     Clear to auscultation,respirations regular, even and unlabored    Heart:    irregular rhythm and normal rate, normal S1 and S2, no murmur, no gallop, no rub, no click   Chest Wall:    No abnormalities observed   Abdomen:     Normal bowel sounds, no masses, no organomegaly, soft        non-tender, non-distended, no guarding, no rebound  tenderness   Extremities:   Moves all extremities well, no edema, no cyanosis, no redness   Pulses:   Pulses palpable and equal bilaterally. Normal radial, carotid, femoral, dorsalis pedis and posterior tibial pulses bilaterally. Normal abdominal aorta   Skin:  Psychiatric:   No bleeding, bruising or rash    Alert and oriented x 3, normal mood and affect           Results from last 7 days  Lab Units 02/12/18  0456   SODIUM mmol/L 141   POTASSIUM mmol/L 3.8   CHLORIDE mmol/L 105   CO2 mmol/L 21.7*   BUN mg/dL 17   CREATININE mg/dL 0.73   CALCIUM mg/dL 8.4*   BILIRUBIN mg/dL 14.2*   ALK PHOS U/L 185*   ALT (SGPT) U/L 45*   AST (SGOT) U/L 50*   GLUCOSE mg/dL 93           Results from last 7 days  Lab Units 02/12/18  0456   WBC 10*3/mm3 8.15   HEMOGLOBIN g/dL 11.1*   HEMATOCRIT % 34.8*   PLATELETS 10*3/mm3 275       Results from last 7 days  Lab Units 02/12/18  0456   INR  1.16*     TELE 2/12/18 11:53 a.m.        EKG 2/11/2018              EKG 12/09/2017      I personally viewed and interpreted the patient's EKG/Telemetry data.        Current Facility-Administered Medications:   •  clonazePAM (KlonoPIN) tablet 0.5 mg, 0.5 mg, Oral, BID PRN, Laurence Samayoa MD  •  diltiaZEM CD (CARDIZEM CD) 24 hr capsule 120 mg, 120 mg, Oral, Q24H, Laurence Samayoa MD, 120 mg at 02/12/18 0839  •  diphenhydrAMINE (BENADRYL)  injection 25 mg, 25 mg, Intravenous, Q6H PRN, Derek Armstrong MD, 25 mg at 02/12/18 0229  •  enoxaparin (LOVENOX) syringe 40 mg, 40 mg, Subcutaneous, Q24H, Laurence Samayoa MD, 40 mg at 02/12/18 1254  •  metoprolol succinate XL (TOPROL-XL) 24 hr tablet 50 mg, 50 mg, Oral, Daily, Laurence Samayoa MD, 50 mg at 02/12/18 0840  •  morphine injection 1 mg, 1 mg, Intravenous, Q4H PRN **AND** naloxone (NARCAN) injection 0.4 mg, 0.4 mg, Intravenous, Q5 Min PRN, Laurence Samayoa MD  •  ondansetron (ZOFRAN) injection 4 mg, 4 mg, Intravenous, Q4H PRN, Laurence Samayoa MD, 4 mg at 02/12/18 0548  •  sodium chloride 0.9 % flush 1-10 mL, 1-10 mL, Intravenous, PRN, Laurence Samayoa MD  •  traMADol (ULTRAM) tablet 50 mg, 50 mg, Oral, Q6H PRN, Laurence Samayoa MD  •  zolpidem (AMBIEN) tablet 5 mg, 5 mg, Oral, Nightly PRN, Laurence Samayoa MD, 5 mg at 02/11/18 2251    Assessment and Plan:       Active Hospital Problems (** Indicates Principal Problem)    Diagnosis Date Noted   • **Jaundice [R17] 02/11/2018   • Anorexia [R63.0] 02/11/2018   • Hyperthyroidism [E05.90] 12/12/2017   • Abnormal weight loss [R63.4] 12/09/2017   • Nausea & vomiting [R11.2] 12/09/2017   • BARBIE (obstructive sleep apnea) [G47.33] 12/09/2017   • PAF (paroxysmal atrial fibrillation) [I48.0] 12/09/2017   • Bipolar I disorder, single manic episode [F30.9] 11/09/2017      Resolved Hospital Problems    Diagnosis Date Noted Date Resolved   No resolved problems to display.     1.  Jaundice with markedly elevated total bilirubin, initially 19 on arrival.  Workup is underway  2.  Paroxysmal atrial fibrillation-currently with RVR, will increase her metoprolol for augmented rate control. CHADS-Vasc score is 1, initial AFIb was in the setting of acute thyrotoxicosis. No OAC for now; acute hepatic impairment increases bleeding risk, and her CHADS-Vasc is only 1.    Victoriano Murdock III, MD  02/12/18  3:50 PM

## 2018-02-12 NOTE — PLAN OF CARE
Problem: Patient Care Overview (Adult)  Goal: Plan of Care Review  Outcome: Ongoing (interventions implemented as appropriate)   02/12/18 0055   Coping/Psychosocial Response Interventions   Plan Of Care Reviewed With patient   Outcome Evaluation   Outcome Summary/Follow up Plan Pt transferred from [facility] [place] r/t atrial fib, HR 80's-140's, no c/o pain and no c/o nausea at this time. Pt up ad yusra, IVF continuous, PO potassium given per order, alert and oriented on RA, MRCP done today, pt is jaundiced. Will continue to monitor and follow    02/12/18 0055   Coping/Psychosocial Response Interventions   Plan Of Care Reviewed With patient   Outcome Evaluation   Outcome Summary/Follow up Plan Pt transferred from [place] r/t atrial fib, HR 80's-140's, no c/o pain and no c/o nausea at this time. Pt up ad yusra, IVF continuous, PO potassium given per order, alert and oriented on RA, MRCP done today, pt is jaundiced. Will continue to monitor and follow md orders   md orders       Problem: Nutrition, Imbalanced: Inadequate Oral Intake (Adult)  Goal: Identify Related Risk Factors and Signs and Symptoms  Outcome: Ongoing (interventions implemented as appropriate)   02/12/18 0055   Nutrition, Imbalanced: Inadequate Oral Intake   Nutrition Imbalanced: Less than Body Requirements: Related Risk Factors appetite decreased   Signs and Symptoms (Nutrition Imbalance, Inadequate Oral Intake: Signs and Symptoms) satiety early;nausea and vomiting;weight decreased (percent weight loss, percent usual body weight, body mass index less than 18.5) (Adults)     Goal: Improved Oral Intake  Outcome: Ongoing (interventions implemented as appropriate)   02/12/18 0055   Nutrition, Imbalanced: Inadequate Oral Intake (Adult)   Improved Oral Intake making progress toward outcome     Goal: Prevent Further Weight Loss  Outcome: Ongoing (interventions implemented as appropriate)   02/12/18 0055   Nutrition, Imbalanced: Inadequate Oral Intake (Adult)   Prevent  Further Weight Loss making progress toward outcome       Problem: Arrhythmia/Dysrhythmia (Symptomatic) (Adult)  Goal: Signs and Symptoms of Listed Potential Problems Will be Absent or Manageable (Arrhythmia/Dysrhythmia)  Outcome: Ongoing (interventions implemented as appropriate)   02/12/18 0055   Arrhythmia/Dysrhythmia (Symptomatic)   Problems Assessed (Arrhythmia/Dysrhythmia) all   Problems Present (Arrhythmia/Dysrhythmia) none

## 2018-02-12 NOTE — PLAN OF CARE
Problem: Patient Care Overview (Adult)  Goal: Plan of Care Review  Outcome: Ongoing (interventions implemented as appropriate)   02/12/18 1812   Coping/Psychosocial Response Interventions   Plan Of Care Reviewed With patient   Outcome Evaluation   Outcome Summary/Follow up Plan VSS. Patient is Jaundiced. Patient anxious to have definitive answers regarding condition. Will continue to monitor.   Patient Care Overview   Progress improving      02/12/18 1812   Coping/Psychosocial Response Interventions   Plan Of Care Reviewed With patient   Outcome Evaluation   Outcome Summary/Follow up Plan VSS. Patient is Jaundiced. Patient anxious to have definitive answers regarding condition. Will continue to monitor.   Patient Care Overview   Progress improving     Goal: Adult Individualization and Mutuality  Outcome: Ongoing (interventions implemented as appropriate)      Problem: Nutrition, Imbalanced: Inadequate Oral Intake (Adult)  Goal: Identify Related Risk Factors and Signs and Symptoms  Outcome: Ongoing (interventions implemented as appropriate)    Goal: Improved Oral Intake  Outcome: Ongoing (interventions implemented as appropriate)    Goal: Prevent Further Weight Loss  Outcome: Ongoing (interventions implemented as appropriate)      Problem: Arrhythmia/Dysrhythmia (Symptomatic) (Adult)  Goal: Signs and Symptoms of Listed Potential Problems Will be Absent or Manageable (Arrhythmia/Dysrhythmia)  Outcome: Ongoing (interventions implemented as appropriate)

## 2018-02-12 NOTE — NURSING NOTE
Patient spoke to PCP office. Was treated with cefdinir 300mg PO BID for 7 days to treat UTI. This was in January. Pt stated she didn't feel relief until after antibiotic was completed.

## 2018-02-12 NOTE — PROGRESS NOTES
"Discharge Planning Assessment  [facility]     Patient Name: Bowen Concepcion  MRN: [id number]  Today's Date: 2/12/2018    Admit Date: 2/11/2018          Discharge Needs Assessment       02/12/18 1642    Living Environment    Lives With alone    Living Arrangements house   [place] home    Home Accessibility no concerns    Transportation Available car;agency transportation   [facility]    Discharge Needs Assessment    Equipment Currently Used at Home bipap/ cpap    Discharge Planning Comments Spoke with pt at bedside.  Facesheet and pharmacy info confirmed.  Pt lives alone in a patio home, is IADLs and can drive \"some.\"  She also sometimes uses a transportation service as needed. Pt states her sister occasionally assists her at home including taking her to the grocery.   No hx of HH or SNF.              Discharge Plan       02/12/18 1647    Case Management/Social Work Plan    Plan Pt plans to return home upon DC.  CCP will continue to follow and assist as needed.                Demographic Summary       02/12/18 1640    Referral Information    Admission Type inpatient    Arrived From home or self-care    Referral Source admission list    Reason For Consult discharge planning    Record Reviewed medical record    Primary Care Physician Information    Name Aubrey Pitts MD            Functional Status       02/12/18 1640    Functional Status Current    Ambulation 0-->independent    Transferring 0-->independent    Toileting 0-->independent    Bathing 0-->independent    Dressing 0-->independent    Eating 0-->independent    Communication 0-->understands/communicates without difficulty    Swallowing (if score 2 or more for any item, consult Rehab Services) 0-->swallows foods/liquids without difficulty    Functional Status Prior    Ambulation 0-->independent    Transferring 0-->independent    Toileting 0-->independent    Bathing 0-->independent    Dressing 0-->independent    Eating 0-->independent    Communication " 0-->understands/communicates without difficulty    Swallowing 0-->swallows foods/liquids without difficulty    IADL    Medications independent    Meal Preparation independent    Housekeeping independent    Laundry independent    Shopping independent    Oral Care independent    Activity Tolerance    Usual Activity Tolerance good    Current Activity Tolerance fair    Cognitive/Perceptual/Developmental    Current Mental Status/Cognitive Functioning no deficits noted      Janine Berg, RN

## 2018-02-13 VITALS
HEART RATE: 105 BPM | TEMPERATURE: 98.5 F | DIASTOLIC BLOOD PRESSURE: 59 MMHG | RESPIRATION RATE: 16 BRPM | OXYGEN SATURATION: 96 % | SYSTOLIC BLOOD PRESSURE: 98 MMHG | BODY MASS INDEX: 41.41 KG/M2 | HEIGHT: 62 IN | WEIGHT: 225 LBS

## 2018-02-13 LAB
ALBUMIN SERPL-MCNC: 2.7 G/DL (ref 3.5–5.2)
ALBUMIN/GLOB SERPL: 0.9 G/DL
ALP SERPL-CCNC: 191 U/L (ref 39–117)
ALT SERPL W P-5'-P-CCNC: 44 U/L (ref 1–33)
ANION GAP SERPL CALCULATED.3IONS-SCNC: 12 MMOL/L
AST SERPL-CCNC: 50 U/L (ref 1–32)
BILIRUB SERPL-MCNC: 13.9 MG/DL (ref 0.1–1.2)
BUN BLD-MCNC: 16 MG/DL (ref 8–23)
BUN/CREAT SERPL: 19.8 (ref 7–25)
CALCIUM SPEC-SCNC: 8.7 MG/DL (ref 8.6–10.5)
CHLORIDE SERPL-SCNC: 107 MMOL/L (ref 98–107)
CO2 SERPL-SCNC: 23 MMOL/L (ref 22–29)
CREAT BLD-MCNC: 0.81 MG/DL (ref 0.57–1)
DEPRECATED RDW RBC AUTO: 59.2 FL (ref 37–54)
ERYTHROCYTE [DISTWIDTH] IN BLOOD BY AUTOMATED COUNT: 18.9 % (ref 11.7–13)
GFR SERPL CREATININE-BSD FRML MDRD: 71 ML/MIN/1.73
GLOBULIN UR ELPH-MCNC: 3 GM/DL
GLUCOSE BLD-MCNC: 92 MG/DL (ref 65–99)
HCT VFR BLD AUTO: 34.6 % (ref 35.6–45.5)
HGB BLD-MCNC: 11.2 G/DL (ref 11.9–15.5)
MCH RBC QN AUTO: 28.4 PG (ref 26.9–32)
MCHC RBC AUTO-ENTMCNC: 32.4 G/DL (ref 32.4–36.3)
MCV RBC AUTO: 87.6 FL (ref 80.5–98.2)
PLATELET # BLD AUTO: 275 10*3/MM3 (ref 140–500)
PMV BLD AUTO: 10.7 FL (ref 6–12)
POTASSIUM BLD-SCNC: 3.9 MMOL/L (ref 3.5–5.2)
PROT SERPL-MCNC: 5.7 G/DL (ref 6–8.5)
RBC # BLD AUTO: 3.95 10*6/MM3 (ref 3.9–5.2)
SODIUM BLD-SCNC: 142 MMOL/L (ref 136–145)
WBC NRBC COR # BLD: 9.03 10*3/MM3 (ref 4.5–10.7)

## 2018-02-13 PROCEDURE — 96376 TX/PRO/DX INJ SAME DRUG ADON: CPT

## 2018-02-13 PROCEDURE — 80053 COMPREHEN METABOLIC PANEL: CPT | Performed by: INTERNAL MEDICINE

## 2018-02-13 PROCEDURE — 99214 OFFICE O/P EST MOD 30 MIN: CPT | Performed by: INTERNAL MEDICINE

## 2018-02-13 PROCEDURE — 25010000002 ENOXAPARIN PER 10 MG: Performed by: INTERNAL MEDICINE

## 2018-02-13 PROCEDURE — 85027 COMPLETE CBC AUTOMATED: CPT | Performed by: INTERNAL MEDICINE

## 2018-02-13 PROCEDURE — 96372 THER/PROPH/DIAG INJ SC/IM: CPT

## 2018-02-13 PROCEDURE — G0378 HOSPITAL OBSERVATION PER HR: HCPCS

## 2018-02-13 PROCEDURE — 25010000002 ONDANSETRON PER 1 MG: Performed by: INTERNAL MEDICINE

## 2018-02-13 PROCEDURE — 99232 SBSQ HOSP IP/OBS MODERATE 35: CPT | Performed by: INTERNAL MEDICINE

## 2018-02-13 RX ORDER — METOPROLOL SUCCINATE 100 MG/1
100 TABLET, EXTENDED RELEASE ORAL DAILY
Qty: 30 TABLET | Refills: 0 | Status: SHIPPED | OUTPATIENT
Start: 2018-02-14 | End: 2018-03-20 | Stop reason: DRUGHIGH

## 2018-02-13 RX ADMIN — METOPROLOL SUCCINATE 100 MG: 100 TABLET, FILM COATED, EXTENDED RELEASE ORAL at 08:36

## 2018-02-13 RX ADMIN — ENOXAPARIN SODIUM 40 MG: 40 INJECTION SUBCUTANEOUS at 12:54

## 2018-02-13 RX ADMIN — DILTIAZEM HYDROCHLORIDE 120 MG: 120 CAPSULE, COATED, EXTENDED RELEASE ORAL at 08:36

## 2018-02-13 RX ADMIN — ONDANSETRON 4 MG: 2 INJECTION INTRAMUSCULAR; INTRAVENOUS at 06:56

## 2018-02-13 NOTE — PLAN OF CARE
Problem: Patient Care Overview (Adult)  Goal: Plan of Care Review  Outcome: Ongoing (interventions implemented as appropriate)   02/13/18 0509   Coping/Psychosocial Response Interventions   Plan Of Care Reviewed With patient   Outcome Evaluation   Outcome Summary/Follow up Plan Vitals as charted, c/o itching that was relieved w/ PRN benadryl, will continue to monitor.    Patient Care Overview   Progress no change     Goal: Adult Individualization and Mutuality  Outcome: Ongoing (interventions implemented as appropriate)    Goal: Discharge Needs Assessment  Outcome: Ongoing (interventions implemented as appropriate)      Problem: Nutrition, Imbalanced: Inadequate Oral Intake (Adult)  Goal: Identify Related Risk Factors and Signs and Symptoms  Outcome: Ongoing (interventions implemented as appropriate)    Goal: Improved Oral Intake  Outcome: Ongoing (interventions implemented as appropriate)    Goal: Prevent Further Weight Loss  Outcome: Ongoing (interventions implemented as appropriate)

## 2018-02-13 NOTE — PROGRESS NOTES
"    Patient Name: Bowen Concepcion  :1955  62 y.o.      Patient Care Team:  Aubrey Pitts MD as PCP - General (Family Medicine)  Bright Rodriguez MD as Consulting Physician (Psychiatry)    Chief Complaint: AFIB    Interval History: sleeping, awakens, denies CP, HR 60's while sleeping       Objective   Vital Signs  Temp:  [97.8 °F (36.6 °C)-98.2 °F (36.8 °C)] 97.8 °F (36.6 °C)  Heart Rate:  [] 85  Resp:  [16] 16  BP: (101-114)/(60-64) 114/63    Intake/Output Summary (Last 24 hours) at 18 0802  Last data filed at 18 1700   Gross per 24 hour   Intake              720 ml   Output                0 ml   Net              720 ml     Flowsheet Rows         First Filed Value    Admission Height  157.5 cm (62\") Documented at 2018 1010    Admission Weight  97.1 kg (214 lb) Documented at 2018 1010          Physical Exam:   General Appearance:    comfortable, in no acute distress   Lungs:     Clear to auscultation.  Normal respiratory effort and rate.      Heart:    irregular rhythm and normal rate, normal S1 and S2, no murmurs, gallops or rubs.     Chest Wall:    No abnormalities observed   Abdomen:     Soft, nontender, positive bowel sounds.     Extremities:   no cyanosis, clubbing or edema.  No marked joint deformities.  Adequate musculoskeletal strength.       Results Review:      Results from last 7 days  Lab Units 18  0250   SODIUM mmol/L 142   POTASSIUM mmol/L 3.9   CHLORIDE mmol/L 107   CO2 mmol/L 23.0   BUN mg/dL 16   CREATININE mg/dL 0.81   GLUCOSE mg/dL 92   CALCIUM mg/dL 8.7           Results from last 7 days  Lab Units 18  0250   WBC 10*3/mm3 9.03   HEMOGLOBIN g/dL 11.2*   HEMATOCRIT % 34.6*   PLATELETS 10*3/mm3 275       Results from last 7 days  Lab Units 18  0456   INR  1.16*                       Medication Review:     diltiaZEM  mg Oral Q24H   enoxaparin 40 mg Subcutaneous Q24H   metoprolol succinate  mg Oral Daily             Assessment/Plan "   Active Hospital Problems (** Indicates Principal Problem)    Diagnosis Date Noted   • **Jaundice [R17] 02/11/2018   • Anorexia [R63.0] 02/11/2018   • Hyperthyroidism [E05.90] 12/12/2017   • Abnormal weight loss [R63.4] 12/09/2017   • Nausea & vomiting [R11.2] 12/09/2017   • BARBIE (obstructive sleep apnea) [G47.33] 12/09/2017   • PAF (paroxysmal atrial fibrillation) [I48.0] 12/09/2017   • Bipolar I disorder, single manic episode [F30.9] 11/09/2017      Resolved Hospital Problems    Diagnosis Date Noted Date Resolved   No resolved problems to display.     1.  Jaundice with markedly elevated total bilirubin, initially 19 on arrival. Improving. Workup is underway  2.  Paroxysmal atrial fibrillation- metoprolol for rate control. CHADS-Vasc score is 1, initial AFIb was in the setting of acute thyrotoxicosis. No OAC for now; acute hepatic impairment increases bleeding risk, and her CHADS-Vasc is only 1.       Victoriano Murdock III, MD  [facility]  02/13/18  8:02 AM

## 2018-02-13 NOTE — PROGRESS NOTES
"     LOS: 1 day   Primary Care Physician: Aubrey Pitts MD     Subjective   Feels okay.  Tolerating diet now.  Ate almost all of her lunch.    Vital Signs  Body mass index is 39.39 kg/(m^2).  Temp:  [97.7 °F (36.5 °C)-98.2 °F (36.8 °C)] 98.2 °F (36.8 °C)  Heart Rate:  [] 102  Resp:  [16] 16  BP: ()/(60-82) 103/60      Objective:  General Appearance:  In no acute distress (Jaundiced).    Vital signs: (most recent): Blood pressure 103/60, pulse 102, temperature 98.2 °F (36.8 °C), temperature source Oral, resp. rate 16, height 157.5 cm (62.01\"), weight 97.7 kg (215 lb 6.2 oz), SpO2 96 %, not currently breastfeeding.    HEENT: (No JVD.  Neck supple.  Trachea midline.  No lymphadenopathy)    Lungs:  There are decreased breath sounds.  No wheezes, rales or rhonchi.    Heart: Normal rate.  Irregular rhythm.  No murmur.   Abdomen: Abdomen is soft and non-distended.  Bowel sounds are normal.   There is no abdominal tenderness.   There is no splenomegaly. There is no hepatomegaly.   Extremities: There is dependent edema.  (Trace to 1+)  Neurological: Patient is alert.    Pupils:  (Icterus).    Skin:  Warm and dry.  (Jaundiced)        Results Review:    I reviewed the patient's new clinical results.      Results from last 7 days  Lab Units 02/12/18  0456 02/11/18  1209   WBC 10*3/mm3 8.15 11.12*   HEMOGLOBIN g/dL 11.1* 13.0   PLATELETS 10*3/mm3 275 350       Results from last 7 days  Lab Units 02/12/18  0456 02/11/18  1209   SODIUM mmol/L 141 141   POTASSIUM mmol/L 3.8 3.4*   CHLORIDE mmol/L 105 102   CO2 mmol/L 21.7* 21.4*   BUN mg/dL 17 19   CREATININE mg/dL 0.73 0.82   CALCIUM mg/dL 8.4* 9.3   GLUCOSE mg/dL 93 100*       Results from last 7 days  Lab Units 02/12/18  0456   INR  1.16*     Hemoglobin A1C:  Lab Results   Component Value Date    HGBA1C 5.70 (H) 11/09/2017       Glucose Range:No results found for: POCGLU    No results found for: [id number]    Lab Results   Component Value Date    TSH 2.100 " 02/10/2018       Assessment & Plan      Medication Review: Yes    Active Hospital Problems (** Indicates Principal Problem)    Diagnosis Date Noted   • **Jaundice [R17] 02/11/2018   • Anorexia [R63.0] 02/11/2018   • Hyperthyroidism [E05.90] 12/12/2017   • Abnormal weight loss [R63.4] 12/09/2017   • Nausea & vomiting [R11.2] 12/09/2017   • BARBIE (obstructive sleep apnea) [G47.33] 12/09/2017   • PAF (paroxysmal atrial fibrillation) [I48.0] 12/09/2017   • Bipolar I disorder, single manic episode [F30.9] 11/09/2017      Resolved Hospital Problems    Diagnosis Date Noted Date Resolved   No resolved problems to display.       Assessment/Plan  1.  Jaundice.  Total bilirubin is decreasing.  MRCP did not show ductal dilatation though there is cholelithiasis.  Small lesions in the pancreatic head and neck present.  Recheck labs in a.m.  Possibly secondary to medications: Topamax, less likely Tapazole  2.  PAF with rapid ventricular rate.  Cardiology consulted regarding rate management and whether anticoagulation is needed.  3.  Sleep apnea, on CPAP  4.  Bipolar disorder.  Topamax on hold.  She will need to follow-up with her psychiatrist  5.  Hyperthyroidism.  Dr. Diaz saw her yesterday    Laurence Samayoa MD  02/12/18  7:08 PM

## 2018-02-13 NOTE — DISCHARGE SUMMARY
PHYSICIAN DISCHARGE SUMMARY                                                                        [facility]    Patient Identification:  Name: Bowen Concepcion  Age: 62 y.o.  Sex: female  :  1955  MRN: [id number]  Primary Care Physician: Aubrey Pitts MD    Admit date: 2018  Discharge date and time: No discharge date for patient encounter.   Discharged Condition: good    Discharge Diagnoses:Principal Problem:    Jaundice 2nd to cholestatic hepatitis secondary to medication.  Active Problems:    Bipolar I disorder, single manic episode    PAF (paroxysmal atrial fibrillation)    BARBIE (obstructive sleep apnea)    Nausea & vomiting    Abnormal weight loss    Hyperthyroidism    Anorexia    Pancreatic cyst: Outpatient follow-up.       Hospital Course:  Pleasant 62-year-old female who went to see her physician due to anorexia and nausea.  Workup revealed elevation of the LFTs, particularly the bilirubin which is reported to been 17.  She was admitted for further evaluation and treatment.  Please H&P for full details.  On presentation her bilirubin had reached 19.9.  This was primarily direct bilirubin.  This associated elevated alkaline phosphatase and mild to moderate increase in transaminase levels.  Workup including MRCP did not show any evidence of obstruction.  She was seen by both gastroneurology as well as her endocrinologist during hospitalization.  Tapazole and Topamax were both put on hold.  LFTs been improving on a daily basis since then.  Bilirubin is now down to 13.9.  Alkaline phosphatase down to 191 from 257.  Symptomatically the patient is feeling markedly improved.  She does have a history of a fibrillation and were some issues with rate controlled in hospitalization.  She is seen by cardiologist and the Toprol-XL dose was increased with good response.  Also incidentally noted on the MRCP was small pancreatic  cyst and outpatient follow-up is recommended.  Presently the patient taking oral intake well again and improving both physically as well as her labs she can be discharge remainder of her treatment follow-up as an outpatient.    Consults:     Consults     Date and Time Order Name Status Description    2/12/2018 0842 Inpatient Consult to Cardiology Completed     2/11/2018 1407 Inpatient Consult to Endocrinology Completed     2/11/2018 1216 Inpatient Consult to Gastroenterology Completed             Discharge Exam:  Physical Exam   Afebrile vital signs stable.  Well-developed well-nourished female in no apparent distress.  Lungs clear to auscultation good air movement.  Heart irregular with good rate control.  Abdomen with normal bowel sounds no tenderness organomegaly guarding or masses.  Extremities with no clubbing cyanosis or edema.  Alert oriented conversant cooperative and pleasant.       Disposition:  Home    Patient Instructions:    Bowen Concepcion   Home Medication Instructions LISA:[id number]    Printed on:02/13/18 1114   Medication Information                      aspirin 81 MG tablet  Take 1 tablet by mouth Daily.             Cholecalciferol (VITAMIN D) 2000 units capsule  Take 2,000 Units by mouth Daily.             clonazePAM (KlonoPIN) 0.5 MG tablet  Take 0.5 mg by mouth 2 (Two) Times a Day As Needed for Anxiety.             diltiaZEM CD (CARDIZEM CD) 120 MG 24 hr capsule  Take 1 capsule by mouth Daily.             famotidine (PEPCID) 10 MG tablet  Take 1 tablet by mouth 2 (Two) Times a Day As Needed for Heartburn.             metoprolol succinate XL (TOPROL-XL) 100 MG 24 hr tablet  Take 1 tablet by mouth Daily for 365 doses.             zolpidem CR (AMBIEN CR) 12.5 MG CR tablet  Take 12.5 mg by mouth At Night As Needed for Sleep.               Diet Instructions     Diet: Cardiac       Discharge Diet:  Cardiac               Future Appointments  Date Time Provider Department Center   3/14/2018 10:00 AM  Chelly Dela Cruz, APRN [facility] None   4/13/2018 1:45 PM Victoriano Murdock III, MD [facility] None   5/11/2018 9:00 AM Roman BARBA MD [facility] None     Additional Instructions for the Follow-ups that You Need to Schedule     Hepatic Function Panel    Feb 19, 2018 (Approximate)    Results to PCP   Order Comments:  Results to PCP                  Follow-up Information     Follow up with Aubrey Pitts MD .    Specialties:  Family Medicine, Emergency Medicine    Contact information:    [address] [address]  [phone]          Discharge Order     Start     Ordered    02/13/18 1116  Discharge patient  Once     Expected Discharge Date:  02/13/18    Discharge Disposition:  Home or Self Care        02/13/18 1118            Total time spent discharging patient including evaluation,post hospitalization follow up,  medication and post hospitalization instructions and education total time exceeds 30 minutes.    Signed:  Yeyo Paris MD  2/13/2018  11:19 AM    EMR Dragon/Transcription disclaimer:   Much of this encounter note is an electronic transcription/translation of spoken language to printed text. The electronic translation of spoken language may permit erroneous, or at times, nonsensical words or phrases to be inadvertently transcribed; Although I have reviewed the note for such errors, some may still exist.

## 2018-02-13 NOTE — PROGRESS NOTES
[facility]/GI Progress Note   Chief Complaint:  Elevated LFT's, jaundice    Subjective     Interval History: No abd pain, no n/v, labs improving.  MRCP reviewed with pt.  Anxious to go home    History taken from: patient chart    Review of Systems:    All systems were reviewed and negative except for:  Cardiovascular: positive for  irregular pulse  Gastrointestinal: postitive for  jaundice    Objective     Vital Signs  Temp:  [97.8 °F (36.6 °C)-98.3 °F (36.8 °C)] 98.3 °F (36.8 °C)  Heart Rate:  [] 99  Resp:  [16] 16  BP: (100-114)/(60-70) 100/70  Body mass index is 41.14 kg/(m^2).    Intake/Output Summary (Last 24 hours) at 02/13/18 1035  Last data filed at 02/13/18 0918   Gross per 24 hour   Intake              720 ml   Output                0 ml   Net              720 ml     I/O this shift:  In: 240 [P.O.:240]  Out: -     Physical Exam:   General: patient awake, alert and cooperative.  Resing in bed, no distress   Eyes: Normal lids and lashes, scleral icterus, no conjunctival pallor   Neck: supple, normal ROM, no tracheal deviation, no thyromegaly   Skin: warm and dry, jaundiced   Cardiovascular: regular rhythm and rate, no murmurs auscultated   Pulm: clear to auscultation bilaterally, regular and unlabored   Abdomen: obese, round, soft, nontender, nondistended; normal bowel sounds   Rectal: deferred   Extremities: no rash or edema   Neurologic: Normal mood and behavior    All Medications Have Been Reviewed     Results Review:       Results from last 7 days  Lab Units 02/13/18  0250 02/12/18  0456 02/11/18  1209   WBC 10*3/mm3 9.03 8.15 11.12*   HEMOGLOBIN g/dL 11.2* 11.1* 13.0   HEMATOCRIT % 34.6* 34.8* 38.9   PLATELETS 10*3/mm3 275 275 350         Results from last 7 days  Lab Units 02/13/18  0250 02/12/18  0456 02/11/18  1209   SODIUM mmol/L 142 141 141   POTASSIUM mmol/L 3.9 3.8 3.4*   CHLORIDE mmol/L 107 105 102   CO2 mmol/L 23.0 21.7* 21.4*   BUN mg/dL 16 17 19   CREATININE mg/dL 0.81 0.73 0.82    CALCIUM mg/dL 8.7 8.4* 9.3   BILIRUBIN mg/dL 13.9* 14.2* 18.8*   ALK PHOS U/L 191* 185* 253*   ALT (SGPT) U/L 44* 45* 58*   AST (SGOT) U/L 50* 50* 62*   GLUCOSE mg/dL 92 93 100*         Results from last 7 days  Lab Units 02/12/18  0456   INR  1.16*       RADIOLOGY:    Imaging Results (last 72 hours)     Procedure Component Value Units Date/Time    MRI abdomen w wo contrast mrcp [[id number]] Collected:  02/12/18 1159     Updated:  02/12/18 1200    Narrative:       MRI OF THE LIVER WITH AND WITHOUT CONTRAST, MRCP     HISTORY: Abnormal liver function tests. Abdominal pain and jaundice.     TECHNIQUE: MRI of the abdomen was performed utilizing a dedicated liver  protocol. Axial 2-D FIESTA, in and out of phase FSPGR, axial  fat-saturated T2 imaging was acquired. Axial thin section precontrast  and dynamic enhanced postcontrast T1 weighted LAVA imaging was acquired.  Thick slab coronal MRCP imaging was acquired in multiple obliquities  through the biliary tree. Coronal thin section fat saturated T2 and 3-D  MRCP imaging was acquired.      COMPARISON: Ultrasound abdomen from 12/10/2017, CT abdomen and pelvis  from 12/09/2017.     FINDINGS: Cholelithiasis is present. There is additionally a T2  hypointense lobulated filling defect seen at the fundus of the  gallbladder that is iso to mildly hyperintense and precontrast  T1-weighted images with suspicion of enhancement may represent a  gallbladder polyp. There is no intrahepatic biliary dilatation present.  No common bile duct filling defect or dilatation is identified. The  pancreatic duct demonstrates normal caliber. There is a 5 mm T2  hyperintense lesion seen within the pancreatic head. A similar 5 mm  lesion is also seen in the region of the pancreatic neck.     The liver demonstrates normal signal intensity. No focal hepatic lesions  identified on the pre or postcontrast imaging. The spleen is  unremarkable. The visualized adrenal gland and kidneys are  normal.  Subcentimeter retroperitoneal lymph nodes are present. No ascites.       Impression:       1.  There is no intra or extrahepatic biliary dilatation. No  choledocholithiasis. Cholelithiasis is present with a suspected 6 mm  polyp at the gallbladder fundus.  2.  Small 5 mm T2 hyperintense cystic lesions within the pancreatic head  and neck. These may represent small pseudocysts, ductal ectasia, or  branch intraductal papillary mucinous neoplasm. Followup MR can be  performed in 1 year to reevaluate.             Assessment/Plan     Patient Active Problem List   Diagnosis Code   • Bipolar I disorder, single manic episode F30.9   • Acid reflux K21.9   • HLD (hyperlipidemia) E78.5   • LBP (low back pain) M54.5   • PAF (paroxysmal atrial fibrillation) I48.0   • BARBIE (obstructive sleep apnea) G47.33   • Nausea & vomiting R11.2   • Hypokalemia E87.6   • Abnormal weight loss R63.4   • Hyperthyroidism E05.90   • Visual changes H53.9   • Nontoxic multinodular goiter E04.2   • Jaundice R17   • Anorexia R63.0     Cyndy Aguirre, APRN  02/13/18  10:35 AM        We are following for elevated liver tests, likely secondary to drug-induced liver injury    Constitutional: She is oriented to person, place, and time. She appears well-developed and well-nourished.   HENT: anicteric, no thyromegaly  Head: Normocephalic and atraumatic.   Eyes: Conjunctivae and EOM are normal.   Neck: Normal range of motion. No tracheal deviation present. Cardiovascular: Normal rate and regular rhythm.    Pulmonary/Chest: Effort normal and breath sounds normal. No respiratory distress.   Abdominal: Soft. Bowel sounds are normal. She exhibits no distension and no mass. There is no tenderness. There is no rebound and no guarding.   Musculoskeletal: Normal range of motion.   Neurological: She is alert and oriented to person, place, and time.   Skin: Skin is warm and dry.   Psychiatric: She has a normal mood and affect. Judgment normal.       Nursing  note and vitals reviewed.   Assessment:  1.Jaundice- MRCP with no evidence of bile duct obstruction  2. Elevated LFTs- improving  3. Cholelithiasis- per MRCP with no choledocholithiasis  4. A fib- cards following and no plans for OAC as hepatic impairment increases bleeding risk and her CHADS score is low     Drug-induced liver injury can cause this pattern of liver injury. Recent treatment with cefdinir 300 mg po BID for 7 days to treat UTI in January.  Per medication review can cause elevated liver tests as possible side effect.  Also on Topamax as out pt, currently on hold      We are agreeable to discharge home, discharge summary has been initiated  Her primary care doctor check her LFTs in 2 weeks, if they are still elevated she can follow-up with us in the office

## 2018-02-14 NOTE — PROGRESS NOTES
Case Management Discharge Note    Final Note: Pt was dc'd home    Discharge Placement     No information found        Other: Other    Discharge Codes: 01  Discharge to home

## 2018-02-16 NOTE — TELEPHONE ENCOUNTER
Patient called stating that she thinks she has a UTI. She was treated for this when she was in the hospital a couple weeks back and thinks it has came back. Patient would like to know what she can take for this. Per Dr. Pitts, give patient Omnicef 300 mg one bid x7 days #14. Patient notified. Prescription sent to pharmacy per Dr. Pitts.   Tried calling patient to inform of normal test results could not leave voicemail is full. Letter sent out.

## 2018-02-20 ENCOUNTER — OFFICE VISIT (OUTPATIENT)
Dept: INTERNAL MEDICINE | Facility: CLINIC | Age: 63
End: 2018-02-20

## 2018-02-20 VITALS
HEIGHT: 62 IN | BODY MASS INDEX: 39.69 KG/M2 | SYSTOLIC BLOOD PRESSURE: 114 MMHG | HEART RATE: 83 BPM | WEIGHT: 215.7 LBS | DIASTOLIC BLOOD PRESSURE: 80 MMHG | OXYGEN SATURATION: 98 %

## 2018-02-20 DIAGNOSIS — E80.6 HYPERBILIRUBINEMIA: Primary | ICD-10-CM

## 2018-02-20 DIAGNOSIS — I48.0 PAF (PAROXYSMAL ATRIAL FIBRILLATION) (HCC): ICD-10-CM

## 2018-02-20 DIAGNOSIS — R17 JAUNDICE: ICD-10-CM

## 2018-02-20 PROCEDURE — 99214 OFFICE O/P EST MOD 30 MIN: CPT | Performed by: FAMILY MEDICINE

## 2018-02-20 NOTE — PROGRESS NOTES
Subjective   Bowen Concepcion is a 62 y.o. female.     Chief Complaint   Patient presents with   • [facility] Follow Up     Admitted for jaundice. Discharged 2/13/18         History of Present Illness     Patient is a 62 to female who was recently admitted to [facility] due to anorexia and nausea.  On evaluation in the emergency room she was found to have an elevation of LFTs particular bilirubin, which was 17.  During the time in the hospital area reaches high as 19.9.  They believe that this was a primary direct bilirubin there was also associated elevated alkaline phosphatase, and in mild to moderate increase in transaminase levels.  While in the hospital the patient's workup included an MRCP which did not show any evidence of obstruction.  She was seen by both GI and endocrine.  Patient was stopped on the Tapazole and Topamax.  During her course in the hospital LFTs had been improving on a daily basis.  At time of discharge her bilirubin had got down to 13.9.  Alkaline phosphatase was down to 191 from 257.  During her hospital stay she also has some atrial fibrillation.  She was seen by cardiologist and her Toprol-XL dose was increased, and the patient responded well.  Also during her MRCP, a small pancreatic cyst was seen.    At today's office visit patient notes a doing significantly better since her hospital stay.  She still notes that she has significant discoloration, including yellow skin, and jaundice in her eyes.  She states that she feels better.  She notes that she's not taken the medications which she was stopped in the hospital.  Patient notes that she has follow-up appointments with her gastroenterologist and her endocrinologist.    At today's office visit patient notes that her heart rate feels as if it is controlled.  Her blood pressure is 114/80.  Heart rates of 83.  She denies any palpitations at today's office visit.    The following portions of the patient's history were  reviewed and updated as appropriate: allergies, current medications, past family history, past medical history, past social history, past surgical history and problem list.    Review of Systems   Constitutional: Positive for fatigue. Negative for chills and fever.   HENT: Negative for congestion, rhinorrhea, sinus pain and sore throat.    Eyes: Negative for photophobia and visual disturbance.   Respiratory: Negative for cough, chest tightness and shortness of breath.    Cardiovascular: Negative for chest pain and palpitations.   Gastrointestinal: Negative for diarrhea, nausea and vomiting.   Genitourinary: Negative for dysuria, frequency and urgency.   Skin: Positive for color change. Negative for rash and wound.   Neurological: Negative for dizziness and syncope.   Psychiatric/Behavioral: Negative for behavioral problems and confusion.       Objective   Physical Exam   Constitutional: She is oriented to person, place, and time. She appears well-developed and well-nourished.   HENT:   Head: Normocephalic and atraumatic.   Right Ear: External ear normal.   Left Ear: External ear normal.   Mouth/Throat: Oropharynx is clear and moist.   Eyes: EOM are normal.   Jaundice is noted in bilateral eyes.   Neck: Normal range of motion. Neck supple.   Cardiovascular: Normal rate, regular rhythm and normal heart sounds.    Pulmonary/Chest: Effort normal and breath sounds normal. No respiratory distress.   Musculoskeletal: Normal range of motion.   Lymphadenopathy:     She has no cervical adenopathy.   Neurological: She is alert and oriented to person, place, and time.   Skin: Skin is warm.   Jaundice is evident on face, and neck.  She notes that extends to her upper body.  She does note that it is greatly improved.   Psychiatric: She has a normal mood and affect. Her behavior is normal.   Nursing note and vitals reviewed.        Assessment/Plan   Bowen was seen today for [facility] follow up.    Diagnoses and  all orders for this visit:    Hyperbilirubinemia  -     Comprehensive Metabolic Panel  -     CBC & Differential  -     Ambulatory Referral to Gastroenterology    Jaundice  -     Comprehensive Metabolic Panel  -     CBC & Differential  -     Ambulatory Referral to Gastroenterology    PAF (paroxysmal atrial fibrillation)        -     She should continue her Toprol all  mg daily.  She should also continue her cardizem 120 mg daily.        -     Patient is currently anticoagulated with aspirin 81 mg.                Current outpatient and discharge medications have been reconciled for the patient.  Aubrey Pitts MD      No Follow-up on file.    Dictated utilizing Dragon Voice Recognition Software

## 2018-02-21 LAB
ALBUMIN SERPL-MCNC: 3.4 G/DL (ref 3.5–5.2)
ALBUMIN/GLOB SERPL: 1.3 G/DL
ALP SERPL-CCNC: 182 U/L (ref 39–117)
ALT SERPL-CCNC: 55 U/L (ref 1–33)
AST SERPL-CCNC: 67 U/L (ref 1–32)
BASOPHILS # BLD AUTO: 0.06 10*3/MM3 (ref 0–0.2)
BASOPHILS NFR BLD AUTO: 0.7 % (ref 0–1.5)
BILIRUB SERPL-MCNC: 10.3 MG/DL (ref 0.1–1.2)
BUN SERPL-MCNC: 17 MG/DL (ref 8–23)
BUN/CREAT SERPL: 22.7 (ref 7–25)
CALCIUM SERPL-MCNC: 9.4 MG/DL (ref 8.6–10.5)
CHLORIDE SERPL-SCNC: 102 MMOL/L (ref 98–107)
CO2 SERPL-SCNC: 27.3 MMOL/L (ref 22–29)
CREAT SERPL-MCNC: 0.75 MG/DL (ref 0.57–1)
EOSINOPHIL # BLD AUTO: 0.18 10*3/MM3 (ref 0–0.7)
EOSINOPHIL NFR BLD AUTO: 2 % (ref 0.3–6.2)
ERYTHROCYTE [DISTWIDTH] IN BLOOD BY AUTOMATED COUNT: 19.4 % (ref 11.7–13)
GFR SERPLBLD CREATININE-BSD FMLA CKD-EPI: 78 ML/MIN/1.73
GFR SERPLBLD CREATININE-BSD FMLA CKD-EPI: 95 ML/MIN/1.73
GLOBULIN SER CALC-MCNC: 2.7 GM/DL
GLUCOSE SERPL-MCNC: 124 MG/DL (ref 65–99)
HCT VFR BLD AUTO: 38.9 % (ref 35.6–45.5)
HGB BLD-MCNC: 12.4 G/DL (ref 11.9–15.5)
IMM GRANULOCYTES # BLD: 0.03 10*3/MM3 (ref 0–0.03)
IMM GRANULOCYTES NFR BLD: 0.3 % (ref 0–0.5)
LYMPHOCYTES # BLD AUTO: 1.93 10*3/MM3 (ref 0.9–4.8)
LYMPHOCYTES NFR BLD AUTO: 21.3 % (ref 19.6–45.3)
MCH RBC QN AUTO: 28.8 PG (ref 26.9–32)
MCHC RBC AUTO-ENTMCNC: 31.9 G/DL (ref 32.4–36.3)
MCV RBC AUTO: 90.5 FL (ref 80.5–98.2)
MONOCYTES # BLD AUTO: 0.74 10*3/MM3 (ref 0.2–1.2)
MONOCYTES NFR BLD AUTO: 8.2 % (ref 5–12)
NEUTROPHILS # BLD AUTO: 6.11 10*3/MM3 (ref 1.9–8.1)
NEUTROPHILS NFR BLD AUTO: 67.5 % (ref 42.7–76)
PLATELET # BLD AUTO: 308 10*3/MM3 (ref 140–500)
POTASSIUM SERPL-SCNC: 4.4 MMOL/L (ref 3.5–5.2)
PROT SERPL-MCNC: 6.1 G/DL (ref 6–8.5)
RBC # BLD AUTO: 4.3 10*6/MM3 (ref 3.9–5.2)
SODIUM SERPL-SCNC: 142 MMOL/L (ref 136–145)
WBC # BLD AUTO: 9.05 10*3/MM3 (ref 4.5–10.7)

## 2018-02-26 NOTE — PROGRESS NOTES
Please inform the patient of the following abnormal results.  Liver enzymes still elevated. Bilirubin continues to stay elevated. Will need to check CMP in one week to see if blirubin continues to trend downwards. Downward trend in bilirubin is encouraging.

## 2018-02-27 ENCOUNTER — TELEPHONE (OUTPATIENT)
Dept: INTERNAL MEDICINE | Facility: CLINIC | Age: 63
End: 2018-02-27

## 2018-02-27 NOTE — TELEPHONE ENCOUNTER
Patient notified and voiced understanding. Patient advised to contact office if they have any questions. Lab order put into EP[facility] and one week lab appointment scheduled.

## 2018-02-27 NOTE — TELEPHONE ENCOUNTER
----- Message from Aubrey Pitts MD sent at 2/26/2018  4:44 PM EST -----  Please inform the patient of the following abnormal results.  Liver enzymes still elevated. Bilirubin continues to stay elevated. Will need to check CMP in one week to see if blirubin continues to trend downwards. Downward trend in bilirubin is encouraging.

## 2018-03-05 DIAGNOSIS — R17 ELEVATED BILIRUBIN: Primary | ICD-10-CM

## 2018-03-06 LAB
ALBUMIN SERPL-MCNC: 3.9 G/DL (ref 3.5–5.2)
ALBUMIN/GLOB SERPL: 1.8 G/DL
ALP SERPL-CCNC: 168 U/L (ref 39–117)
ALT SERPL-CCNC: 67 U/L (ref 1–33)
AST SERPL-CCNC: 64 U/L (ref 1–32)
BILIRUB SERPL-MCNC: 4.2 MG/DL (ref 0.1–1.2)
BUN SERPL-MCNC: 12 MG/DL (ref 8–23)
BUN/CREAT SERPL: 14.8 (ref 7–25)
CALCIUM SERPL-MCNC: 9.5 MG/DL (ref 8.6–10.5)
CHLORIDE SERPL-SCNC: 106 MMOL/L (ref 98–107)
CO2 SERPL-SCNC: 25.3 MMOL/L (ref 22–29)
CREAT SERPL-MCNC: 0.81 MG/DL (ref 0.57–1)
GFR SERPLBLD CREATININE-BSD FMLA CKD-EPI: 71 ML/MIN/1.73
GFR SERPLBLD CREATININE-BSD FMLA CKD-EPI: 87 ML/MIN/1.73
GLOBULIN SER CALC-MCNC: 2.2 GM/DL
GLUCOSE SERPL-MCNC: 108 MG/DL (ref 65–99)
POTASSIUM SERPL-SCNC: 4.3 MMOL/L (ref 3.5–5.2)
PROT SERPL-MCNC: 6.1 G/DL (ref 6–8.5)
SODIUM SERPL-SCNC: 146 MMOL/L (ref 136–145)

## 2018-03-07 ENCOUNTER — TELEPHONE (OUTPATIENT)
Dept: INTERNAL MEDICINE | Facility: CLINIC | Age: 63
End: 2018-03-07

## 2018-03-07 DIAGNOSIS — R17 JAUNDICE: ICD-10-CM

## 2018-03-07 DIAGNOSIS — R17 ELEVATED BILIRUBIN: ICD-10-CM

## 2018-03-07 DIAGNOSIS — R74.8 ELEVATED LIVER ENZYMES: Primary | ICD-10-CM

## 2018-03-07 NOTE — TELEPHONE ENCOUNTER
----- Message from Aubrey Pitts MD sent at 3/7/2018  8:35 AM EST -----  Please inform the patient of the following abnormal results.  ALT continues to rise, AST is stable. Bilirubin continue to trend down and is at 4.2. Patient should follow up with GI. Patient will need a repeat CMP in one month, and follow up in office couple days after.

## 2018-03-07 NOTE — TELEPHONE ENCOUNTER
Patient notified and voiced understanding. Patient advised to contact office if they have any questions. Patient stated she is having trouble getting into Dr. Millard's office and wanted to know if she could be referred to another GI doctor. Per Dr. Pitts refer to Dr. Soren Palafox. Referral ordered.

## 2018-03-07 NOTE — PROGRESS NOTES
Please inform the patient of the following abnormal results.  ALT continues to rise, AST is stable. Bilirubin continue to trend down and is at 4.2. Patient should follow up with GI. Patient will need a repeat CMP in one month, and follow up in office couple days after.

## 2018-03-13 ENCOUNTER — OFFICE VISIT (OUTPATIENT)
Dept: ENDOCRINOLOGY | Age: 63
End: 2018-03-13

## 2018-03-13 VITALS
HEART RATE: 98 BPM | DIASTOLIC BLOOD PRESSURE: 82 MMHG | HEIGHT: 62 IN | SYSTOLIC BLOOD PRESSURE: 118 MMHG | WEIGHT: 215.2 LBS | BODY MASS INDEX: 39.6 KG/M2

## 2018-03-13 DIAGNOSIS — E05.90 HYPERTHYROIDISM: Primary | ICD-10-CM

## 2018-03-13 DIAGNOSIS — R17 JAUNDICE OF RECENT ONSET: ICD-10-CM

## 2018-03-13 DIAGNOSIS — R63.4 ABNORMAL WEIGHT LOSS: ICD-10-CM

## 2018-03-13 DIAGNOSIS — E04.2 NONTOXIC MULTINODULAR GOITER: ICD-10-CM

## 2018-03-13 LAB
T4 FREE SERPL-MCNC: 1.52 NG/DL (ref 0.93–1.7)
TSH SERPL DL<=0.005 MIU/L-ACNC: 2 MIU/ML (ref 0.27–4.2)

## 2018-03-13 PROCEDURE — 99214 OFFICE O/P EST MOD 30 MIN: CPT | Performed by: INTERNAL MEDICINE

## 2018-03-13 NOTE — PROGRESS NOTES
62 y.o.    Patient Care Team:  Aubrey Pitts MD as PCP - General (Family Medicine)  Bright Rodriguez MD as Consulting Physician (Psychiatry)    Chief Complaint:      F/U HYPERTHYROIDISM.  HERE TO  DISCUSS LAB RESULTS.  Subjective     HPI   Patient is a 62-year-old white female with a history of hypothyroidism and multinodular goiter came for follow-up  Hypothyroidism  Patient was taking methimazole 10 mg daily.  She was recently hospitalized for severe hyperbilirubinemia and was evaluated  Drug reaction was suspected but possibly including methimazole and antipsychotic drugs [place] patient was advised to discontinue methimazole prior to admission  Her bilirubin levels have steadily come down during the hospitalization  Patient was evaluated by gastroenterology as well  At the time of discharge she was sent home without any medication for thyroid    Multinodular goiter  Patient had multiple nodules in the goiter on the ultrasound done during the hospitalization  She denies any difficulty swallowing or change in voice  Abnormal weight loss  Patient had lost weight initially due to hyper thyroid state but currently stabilizing.     Interval History of recent hospitalization  Patient is a 62-year-old white female admitted to the hospital through the emergency room for symptoms of nausea and anorexia and significant weight loss over the past several months  Patient apparently was following [employer] diet and was noticing nausea and also appetite and sleep problems.  She has been taking Ambien for sleep  She apparently lost nearly 40 pounds in the past 3 months.  Her nausea appears to be worsening and started vomiting yesterday hence she came to the emergency room.  She was noted to be in atrial fibrillation with rapid ventricular response and was started on Cardizem drip and admitted to the hospital  Patient is not treated with amiodarone.  Patient reports that she is on Topamax but denies any prior use of  lithium or amiodarone  Patient does report to heat intolerance, sweating and occasional tremors.  Insomnia has been a serious issue for the past few months  Patient is unaware of any thyroid dysfunction in the past  Patient also denies any recent contrast studies prior to admission     Patient denies any difficulty swallowing or change in voice  Patient is evaluated by cardiology and is currently on medication for atrial fibrillation  Further evaluation since admission suggested gallbladder sludge and stones but apparently not considered to be the etiology for nausea  The following portions of the patient's history were reviewed and updated as appropriate: allergies, current medications, past family history, past medical history, past social history, past surgical history and problem list.    Past Medical History:   Diagnosis Date   • Abnormal weight loss    • Arthritis     OSTEOARTHRITIS RIGHT KNEE AND INDEX FINGERS   • Atrial fibrillation    • Atrial fibrillation with RVR    • Bipolar disorder    • Cholelithiasis     uncompllicated   • Depression    • Hyperkalemia    • Hyperthyroidism 12/12/2017   • Hypokalemia    • Jaundice    • Mild mitral regurgitation    • Mild tricuspid regurgitation    • Nausea and vomiting    • BARBIE (obstructive sleep apnea)    • Sleep apnea    • Uterine mass     muliple masses, 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     Family History   Problem Relation Age of Onset   • Heart disease Mother    • Hypertension Mother    • Depression Mother    • Cancer Father    • Depression Sister    • Depression Brother    • Depression Brother    • Depression Brother    • Depression Sister    • Vision loss Maternal Grandmother    • Vision loss Paternal Grandmother    • Breast cancer Maternal Aunt    • Breast cancer Maternal Aunt      Social History     Social History   • Marital status: Single     Spouse name: N/A   • Number of children: N/A   • Years of education: N/A     Occupational History   • Not on file.     Social History  "Main Topics   • Smoking status: Former Smoker     Packs/day: 2.00     Years: 26.00   • Smokeless tobacco: Never Used   • Alcohol use No   • Drug use: No   • Sexual activity: No     Other Topics Concern   • Not on file     Social History Narrative   • No narrative on file     No Known Allergies    Current Outpatient Prescriptions:   •  aspirin 81 MG tablet, Take 1 tablet by mouth Daily., Disp: , Rfl:   •  Cholecalciferol (VITAMIN D) 2000 units capsule, Take 2,000 Units by mouth Daily., Disp: , Rfl:   •  clonazePAM (KlonoPIN) 0.5 MG tablet, Take 0.5 mg by mouth 2 (Two) Times a Day As Needed for Anxiety., Disp: , Rfl:   •  diltiaZEM CD (CARDIZEM CD) 120 MG 24 hr capsule, Take 1 capsule by mouth Daily., Disp: 90 capsule, Rfl: 3  •  metoprolol succinate XL (TOPROL-XL) 100 MG 24 hr tablet, Take 1 tablet by mouth Daily for 365 doses., Disp: 30 tablet, Rfl: 0  •  zolpidem CR (AMBIEN CR) 12.5 MG CR tablet, Take 12.5 mg by mouth At Night As Needed for Sleep., Disp: , Rfl:         Review of Systems   Constitutional: Positive for fatigue. Negative for chills and fever.   Cardiovascular: Negative for chest pain and palpitations.   Gastrointestinal: Negative for abdominal pain, constipation, diarrhea, nausea and vomiting.   Endocrine: Positive for cold intolerance. Negative for heat intolerance.   All other systems reviewed and are negative.      Objective       Vitals:    03/13/18 0854   BP: 118/82   Pulse: 98   Weight: 97.6 kg (215 lb 3.2 oz)   Height: 157.5 cm (62\")     Body mass index is 39.36 kg/m².      Physical Exam   Constitutional: She is oriented to person, place, and time.   Eyes: EOM are normal. Pupils are equal, round, and reactive to light.   Neck: Normal range of motion. Neck supple.   Cardiovascular: Normal rate, regular rhythm, normal heart sounds and intact distal pulses.    Pulmonary/Chest: Effort normal and breath sounds normal.   Abdominal: Soft. Bowel sounds are normal.   Neurological: She is oriented to " person, place, and time.   Skin: Skin is warm and dry.   Psychiatric: She has a normal mood and affect. Her behavior is normal.   Nursing note and vitals reviewed.    Results Review:     I reviewed the patient's new clinical results.    Medical records reviewed  Summary:      Orders Only on 03/05/2018   Component Date Value Ref Range Status   • Glucose 03/06/2018 108* 65 - 99 mg/dL Final   • BUN 03/06/2018 12  8 - 23 mg/dL Final   • Creatinine 03/06/2018 0.81  0.57 - 1.00 mg/dL Final   • eGFR Non  Am 03/06/2018 71  >60 mL/min/1.73 Final   • eGFR African Am 03/06/2018 87  >60 mL/min/1.73 Final   • BUN/Creatinine Ratio 03/06/2018 14.8  7.0 - 25.0 Final   • Sodium 03/06/2018 146* 136 - 145 mmol/L Final   • Potassium 03/06/2018 4.3  3.5 - 5.2 mmol/L Final   • Chloride 03/06/2018 106  98 - 107 mmol/L Final   • Total CO2 03/06/2018 25.3  22.0 - 29.0 mmol/L Final   • Calcium 03/06/2018 9.5  8.6 - 10.5 mg/dL Final   • Total Protein 03/06/2018 6.1  6.0 - 8.5 g/dL Final   • Albumin 03/06/2018 3.90  3.50 - 5.20 g/dL Final   • Globulin 03/06/2018 2.2  gm/dL Final   • A/G Ratio 03/06/2018 1.8  g/dL Final   • Total Bilirubin 03/06/2018 4.2* 0.1 - 1.2 mg/dL Final   • Alkaline Phosphatase 03/06/2018 168* 39 - 117 U/L Final   • AST (SGOT) 03/06/2018 64* 1 - 32 U/L Final   • ALT (SGPT) 03/06/2018 67* 1 - 33 U/L Final     Lab Results   Component Value Date    HGBA1C 5.70 (H) 11/09/2017     Lab Results   Component Value Date    CREATININE 0.81 03/06/2018     Imaging Results (most recent)     None                Assessment and Plan:    Bowen was seen today for hyperthyroidism.    Diagnoses and all orders for this visit:    Hyperthyroidism  -     T4, Free  -     TSH    Nontoxic multinodular goiter    Abnormal weight loss    Jaundice of recent onset    Patient discontinued methimazole recently due to hyperbilirubinemia  Her bilirubin levels have been improving  Currently it is 4.4 down from 17 1 month ago  Gastroenterology  "evaluation did not reveal any specific cause other than possible drug interaction    Patient discontinued methimazole  She was also advised to speak with a psychiatrist regarding her psych medication    Patient will get lab testing done today  She denies any symptoms suggestive of hyper or hypothyroidism at this point  I have specifically advised the patient not to restart methimazole until further advise  Patient verbalized understanding    Patient will return to follow-up until 4 months     The total time spent for old record and lab review and face- to- face was more than 25 min of which greater than 15 min of time ( greater than 50% of the total time )  was spent face to face with the patient counseling and coordination of care on recommended evaluation and treatment options, instructions for management/treatment and /or follow up  and importance of compliance with chosen management or treatment options    Roman Pop MD. FACE    03/13/18      EMR Dragon / transcription disclaimer:     \"Dictated utilizing Dragon dictation\".         "

## 2018-03-14 ENCOUNTER — PROCEDURE VISIT (OUTPATIENT)
Dept: OBSTETRICS AND GYNECOLOGY | Facility: CLINIC | Age: 63
End: 2018-03-14

## 2018-03-14 ENCOUNTER — OFFICE VISIT (OUTPATIENT)
Dept: OBSTETRICS AND GYNECOLOGY | Facility: CLINIC | Age: 63
End: 2018-03-14

## 2018-03-14 VITALS
DIASTOLIC BLOOD PRESSURE: 66 MMHG | BODY MASS INDEX: 50.17 KG/M2 | SYSTOLIC BLOOD PRESSURE: 116 MMHG | HEIGHT: 55 IN | WEIGHT: 216.8 LBS

## 2018-03-14 DIAGNOSIS — M85.88 OSTEOPENIA OF OTHER SITE: ICD-10-CM

## 2018-03-14 DIAGNOSIS — R14.0 ABDOMINAL BLOATING: Primary | ICD-10-CM

## 2018-03-14 DIAGNOSIS — Z01.419 ENCOUNTER FOR GYNECOLOGICAL EXAMINATION WITHOUT ABNORMAL FINDING: Primary | ICD-10-CM

## 2018-03-14 DIAGNOSIS — M85.88 OSTEOPENIA OF SPINE: ICD-10-CM

## 2018-03-14 DIAGNOSIS — Z13.820 SCREENING FOR OSTEOPOROSIS: Primary | ICD-10-CM

## 2018-03-14 DIAGNOSIS — Z78.0 POSTMENOPAUSAL: ICD-10-CM

## 2018-03-14 PROCEDURE — 77080 DXA BONE DENSITY AXIAL: CPT | Performed by: NURSE PRACTITIONER

## 2018-03-14 PROCEDURE — 76856 US EXAM PELVIC COMPLETE: CPT | Performed by: NURSE PRACTITIONER

## 2018-03-14 PROCEDURE — 99386 PREV VISIT NEW AGE 40-64: CPT | Performed by: NURSE PRACTITIONER

## 2018-03-14 NOTE — PROGRESS NOTES
Bowen Concepcion is a 62 y.o. female.   Chief Complaint   Patient presents with   • Gynecologic Exam     Patient is here for annual.      HPI:pt here for gyn exam , voices no c/o    The following portions of the patient's history were reviewed and updated as appropriate: allergies, current medications, past family history, past medical history, past social history, past surgical history and problem list.    Review of Systems  Review of Systems   Constitutional: Negative.    HENT: Negative.    Respiratory: Negative.    Cardiovascular: Negative.    Gastrointestinal: Negative.    Endocrine: Negative.    Genitourinary: Negative.    Psychiatric/Behavioral: Negative.        Physical Exam   Constitutional: She appears well-developed and well-nourished.   Cardiovascular: Normal rate.    Pulmonary/Chest: Effort normal. Edema: no masses palpated.   Genitourinary: Vagina normal and uterus normal.   Neurological: She is alert.   Skin: Skin is warm.   Psychiatric: She has a normal mood and affect.   Vitals reviewed.    /Plan   Patient Instructions   Pt. Counseled today on safe sex practices, pap smear performed, self breast examinations discussed, if positive family history mammogram starting at age 35 otherwise starting at age 40. Colonoscopy recommended.  Hormone replacement therapy discussed. Aspirin prophylaxis to reduce risk of stroke.  Calcium and Vitamin D requirements discussed.  Diet and exercise also counseled.       Bowen was seen today for gynecologic exam.    Diagnoses and all orders for this visit:    Encounter for gynecological examination without abnormal finding  -     Pap IG, Rfx HPV ASCU        Return in about 1 year (around 3/14/2019).

## 2018-03-16 LAB
CONV .: NORMAL
CYTOLOGIST CVX/VAG CYTO: NORMAL
CYTOLOGY CVX/VAG DOC THIN PREP: NORMAL
DX ICD CODE: NORMAL
HIV 1 & 2 AB SER-IMP: NORMAL
OTHER STN SPEC: NORMAL
PATH REPORT.FINAL DX SPEC: NORMAL
STAT OF ADQ CVX/VAG CYTO-IMP: NORMAL

## 2018-03-20 ENCOUNTER — OFFICE VISIT (OUTPATIENT)
Dept: INTERNAL MEDICINE | Facility: CLINIC | Age: 63
End: 2018-03-20

## 2018-03-20 VITALS
HEART RATE: 73 BPM | WEIGHT: 223.4 LBS | OXYGEN SATURATION: 97 % | SYSTOLIC BLOOD PRESSURE: 118 MMHG | HEIGHT: 62 IN | DIASTOLIC BLOOD PRESSURE: 74 MMHG | BODY MASS INDEX: 41.11 KG/M2

## 2018-03-20 DIAGNOSIS — E80.6 HYPERBILIRUBINEMIA: Primary | ICD-10-CM

## 2018-03-20 DIAGNOSIS — E87.6 HYPOKALEMIA: ICD-10-CM

## 2018-03-20 PROCEDURE — 99214 OFFICE O/P EST MOD 30 MIN: CPT | Performed by: FAMILY MEDICINE

## 2018-03-20 RX ORDER — METOPROLOL SUCCINATE 50 MG/1
50 TABLET, EXTENDED RELEASE ORAL DAILY
COMMUNITY
End: 2018-04-13 | Stop reason: SDUPTHER

## 2018-03-20 RX ORDER — NAPROXEN 250 MG/1
500 TABLET ORAL DAILY PRN
COMMUNITY
End: 2022-06-06

## 2018-03-20 RX ORDER — ARMODAFINIL 250 MG/1
250 TABLET ORAL DAILY
COMMUNITY

## 2018-03-20 NOTE — PROGRESS NOTES
Subjective   Bowen Concepcion is a 62 y.o. female.     Chief Complaint   Patient presents with   • Elevated Hepatic Enzymes         History of Present Illness     Patient presents today with past medical history hyperbilirubinemia & hypokalemia.  Patient notes that she has been doing much better.  He notes a lot of the jaundice has come down from her face.  However there is some some jaundice underneath her eyes.  She notes that her conjuctiva of her eyes have gotten better.  She notes that she feels good.  She notes that she is anxious to get back on her Topamax which her psychiatrist believes she should be on.  This is the medication that she has stopped due to her hyperbilirubinemia.  At this time patient does not have any concerns or complaints.  Patient will like to get her liver enzymes and electrolytes checked at today's office visit.    The following portions of the patient's history were reviewed and updated as appropriate: allergies, current medications, past family history, past medical history, past social history, past surgical history and problem list.    Review of Systems   Constitutional: Negative for chills and fever.   HENT: Negative for congestion, rhinorrhea, sinus pain and sore throat.    Eyes: Negative for photophobia and visual disturbance.   Respiratory: Negative for cough, chest tightness and shortness of breath.    Cardiovascular: Negative for chest pain and palpitations.   Gastrointestinal: Negative for diarrhea, nausea and vomiting.   Genitourinary: Negative for dysuria, frequency and urgency.   Skin: Positive for color change. Negative for rash and wound.   Neurological: Negative for dizziness and syncope.   Psychiatric/Behavioral: Negative for behavioral problems and confusion.       Objective   Physical Exam   Constitutional: She is oriented to person, place, and time. She appears well-developed and well-nourished.   HENT:   Head: Normocephalic and atraumatic.   Right Ear: External  ear normal.   Left Ear: External ear normal.   Mouth/Throat: Oropharynx is clear and moist.   Eyes: EOM are normal.   Neck: Normal range of motion. Neck supple.   Cardiovascular: Normal rate, regular rhythm and normal heart sounds.    Pulmonary/Chest: Effort normal and breath sounds normal. No respiratory distress.   Musculoskeletal: Normal range of motion.   Lymphadenopathy:     She has no cervical adenopathy.   Neurological: She is alert and oriented to person, place, and time.   Skin: Skin is warm.   Mild jaundice located on the face slightly below the eyes.   Psychiatric: She has a normal mood and affect. Her behavior is normal.   Nursing note and vitals reviewed.      Assessment/Plan   Bowen was seen today for elevated hepatic enzymes.    Diagnoses and all orders for this visit:    Hyperbilirubinemia  -     Comprehensive Metabolic Panel    Hypokalemia  -     Comprehensive Metabolic Panel      At today's office visit we'll check CMP to see if hypokalemia and hyperbilirubinemia has improved.  Patient's last bilirubin was 4.2.  This was trending down from her high at the hospital of 18.  Patient should also follow-up with GI.    No Follow-up on file.    Dictated utilizing Dragon Voice Recognition Software

## 2018-03-21 ENCOUNTER — TELEPHONE (OUTPATIENT)
Dept: INTERNAL MEDICINE | Facility: CLINIC | Age: 63
End: 2018-03-21

## 2018-03-21 DIAGNOSIS — R74.8 ELEVATED LIVER ENZYMES: Primary | ICD-10-CM

## 2018-03-21 DIAGNOSIS — R17 ELEVATED BILIRUBIN: ICD-10-CM

## 2018-03-21 LAB
ALBUMIN SERPL-MCNC: 3.9 G/DL (ref 3.5–5.2)
ALBUMIN/GLOB SERPL: 1.6 G/DL
ALP SERPL-CCNC: 159 U/L (ref 39–117)
ALT SERPL-CCNC: 33 U/L (ref 1–33)
AST SERPL-CCNC: 32 U/L (ref 1–32)
BILIRUB SERPL-MCNC: 2.3 MG/DL (ref 0.1–1.2)
BUN SERPL-MCNC: 12 MG/DL (ref 8–23)
BUN/CREAT SERPL: 17.9 (ref 7–25)
CALCIUM SERPL-MCNC: 9.5 MG/DL (ref 8.6–10.5)
CHLORIDE SERPL-SCNC: 105 MMOL/L (ref 98–107)
CO2 SERPL-SCNC: 28.1 MMOL/L (ref 22–29)
CREAT SERPL-MCNC: 0.67 MG/DL (ref 0.57–1)
GFR SERPLBLD CREATININE-BSD FMLA CKD-EPI: 108 ML/MIN/1.73
GFR SERPLBLD CREATININE-BSD FMLA CKD-EPI: 89 ML/MIN/1.73
GLOBULIN SER CALC-MCNC: 2.4 GM/DL
GLUCOSE SERPL-MCNC: 95 MG/DL (ref 65–99)
POTASSIUM SERPL-SCNC: 4.3 MMOL/L (ref 3.5–5.2)
PROT SERPL-MCNC: 6.3 G/DL (ref 6–8.5)
SODIUM SERPL-SCNC: 145 MMOL/L (ref 136–145)

## 2018-03-21 NOTE — TELEPHONE ENCOUNTER
Patient notified and voiced understanding. Patient advised to contact office if they have any questions. Lab appointment scheduled and lab order put into [facility]. Follow up appointment already scheduled.

## 2018-03-21 NOTE — PROGRESS NOTES
Please inform the patient of the following abnormal results.  Alk phos still elevated but trending down. Bilirubin significanly down, but still elevated. AST and ALT have come to normal. Will recheck CMP in one month. Follow up appointment in 3 mos.

## 2018-03-21 NOTE — TELEPHONE ENCOUNTER
----- Message from Aubrey Pitts MD sent at 3/21/2018  1:40 PM EDT -----  The labs were reviewed. Please inform patient that labs were normal.

## 2018-03-21 NOTE — TELEPHONE ENCOUNTER
----- Message from Aubrey Pitts MD sent at 3/21/2018 10:49 AM EDT -----  Please inform the patient of the following abnormal results.  Alk phos still elevated but trending down. Bilirubin significanly down, but still elevated. AST and ALT have come to normal. Will recheck CMP in one month. Follow up appointment in 3 mos.

## 2018-03-22 ENCOUNTER — OFFICE VISIT (OUTPATIENT)
Dept: GASTROENTEROLOGY | Facility: CLINIC | Age: 63
End: 2018-03-22

## 2018-03-22 VITALS
HEIGHT: 62 IN | HEART RATE: 78 BPM | BODY MASS INDEX: 41.77 KG/M2 | DIASTOLIC BLOOD PRESSURE: 74 MMHG | SYSTOLIC BLOOD PRESSURE: 108 MMHG | WEIGHT: 227 LBS

## 2018-03-22 DIAGNOSIS — K71.9 DRUG INDUCED LIVER DISEASE: ICD-10-CM

## 2018-03-22 DIAGNOSIS — R17 JAUNDICE: Primary | ICD-10-CM

## 2018-03-22 PROCEDURE — 99214 OFFICE O/P EST MOD 30 MIN: CPT | Performed by: INTERNAL MEDICINE

## 2018-03-22 NOTE — PROGRESS NOTES
Subjective   Bowen Concepcion is a 62 y.o. female is here today for follow-up.  Chief Complaint   Patient presents with   • Elevated Hepatic Enzymes   • Jaundice     History of Present Illness   This is a patient who was admitted to [facility] 11 February with new onset jaundice.  That time that was her primary complaint, that of dense icterus and this was associated with pruritus but it was not associated with abdominal pain or fever.  Initially, biliary obstruction was suspected.  The patient went on to have an MRCP and even though the exam did demonstrate gallstones, there is no intra-or extrahepatic biliary dilation and no evidence of choledocholithiasis.  Additional findings included a very small set of cystic lesions in the pancreatic head and neck, significance of which were unclear, and follow-up MRI was advised in one year.  Reviewing the patient's liver chemistries, there were noted to be absolutely normal back in December.  Subsequent developments included hyperthyroidism for which she was treated with methimazole, and she also received Omnicef were not per respiratory tract infection.  Both of those medications were discontinued at the time of her previous admission.  From a symptom standpoint she feels back to normal.  Her appetite is fine and she is experiencing no pruritus.  Her liver chemistries have also been followed and these are resolving as well.  The following portions of the patient's history were reviewed and updated as appropriate: allergies, current medications, past family history, past medical history, past social history, past surgical history and problem list.    Review of Systems   Respiratory: Negative for shortness of breath.    Cardiovascular: Negative for chest pain.   All other systems reviewed and are negative.      Objective   Physical Exam   Constitutional: She appears well-developed and well-nourished.   Nursing note and vitals reviewed.        Assessment/Plan    Problems Addressed this Visit        Digestive    Drug induced liver disease       Other    Jaundice - Primary      Other Visit Diagnoses    None.       Most likely diagnosis at this time is drug induced liver injury, and I think the most likely candidate is Omnicef, although methimazole is also been described as causing hepatotoxicity.  She seems to be improving without any additional therapy other than cessation of the medications.  We discussed that the only way to know for sure if these agents are responsible is to give her a challenge, and given how there are alternatives to these medications should they be needed in the future, I don't think this is advisable.  Would recommend she have her liver chemistries rechecked in about a month, and I would anticipate that they will be at or near normal at that time.

## 2018-04-13 ENCOUNTER — OFFICE VISIT (OUTPATIENT)
Dept: CARDIOLOGY | Facility: CLINIC | Age: 63
End: 2018-04-13

## 2018-04-13 VITALS
SYSTOLIC BLOOD PRESSURE: 118 MMHG | HEART RATE: 90 BPM | DIASTOLIC BLOOD PRESSURE: 80 MMHG | HEIGHT: 62 IN | BODY MASS INDEX: 39.93 KG/M2 | WEIGHT: 217 LBS

## 2018-04-13 DIAGNOSIS — I48.0 PAF (PAROXYSMAL ATRIAL FIBRILLATION) (HCC): ICD-10-CM

## 2018-04-13 DIAGNOSIS — E78.2 MIXED HYPERLIPIDEMIA: ICD-10-CM

## 2018-04-13 DIAGNOSIS — I48.19 PERSISTENT ATRIAL FIBRILLATION (HCC): Primary | ICD-10-CM

## 2018-04-13 PROCEDURE — 99213 OFFICE O/P EST LOW 20 MIN: CPT | Performed by: INTERNAL MEDICINE

## 2018-04-13 RX ORDER — METOPROLOL SUCCINATE 50 MG/1
50 TABLET, EXTENDED RELEASE ORAL DAILY
Qty: 90 TABLET | Refills: 3 | Status: SHIPPED | OUTPATIENT
Start: 2018-04-13 | End: 2019-06-03 | Stop reason: SDUPTHER

## 2018-04-13 RX ORDER — ALPRAZOLAM 0.5 MG/1
0.5 TABLET, EXTENDED RELEASE ORAL 2 TIMES DAILY PRN
COMMUNITY
End: 2021-07-19

## 2018-04-13 RX ORDER — TOPIRAMATE 100 MG/1
100 TABLET, FILM COATED ORAL DAILY PRN
COMMUNITY

## 2018-04-17 PROCEDURE — 93000 ELECTROCARDIOGRAM COMPLETE: CPT | Performed by: INTERNAL MEDICINE

## 2018-04-20 ENCOUNTER — RESULTS ENCOUNTER (OUTPATIENT)
Dept: INTERNAL MEDICINE | Facility: CLINIC | Age: 63
End: 2018-04-20

## 2018-04-20 ENCOUNTER — TELEPHONE (OUTPATIENT)
Dept: CARDIOLOGY | Facility: CLINIC | Age: 63
End: 2018-04-20

## 2018-04-20 DIAGNOSIS — R74.8 ELEVATED LIVER ENZYMES: ICD-10-CM

## 2018-04-20 DIAGNOSIS — R17 ELEVATED BILIRUBIN: ICD-10-CM

## 2018-04-20 NOTE — TELEPHONE ENCOUNTER
Pt called to inform you her b/p reading that was done today after stopping the diltiazem. She states that it was 131/92. She said it may be a little stress-related due to some home improvements that she has going on. She can be reached at [phone].

## 2018-04-21 LAB
ALBUMIN SERPL-MCNC: 4.3 G/DL (ref 3.6–4.8)
ALBUMIN/GLOB SERPL: 2 {RATIO} (ref 1.2–2.2)
ALP SERPL-CCNC: 127 IU/L (ref 39–117)
ALT SERPL-CCNC: 17 IU/L (ref 0–32)
AMBIG ABBREV CMP14 DEFAULT: NORMAL
AST SERPL-CCNC: 19 IU/L (ref 0–40)
BILIRUB SERPL-MCNC: 0.8 MG/DL (ref 0–1.2)
BUN SERPL-MCNC: 14 MG/DL (ref 8–27)
BUN/CREAT SERPL: 18 (ref 12–28)
CALCIUM SERPL-MCNC: 9.3 MG/DL (ref 8.7–10.3)
CHLORIDE SERPL-SCNC: 109 MMOL/L (ref 96–106)
CO2 SERPL-SCNC: 23 MMOL/L (ref 18–29)
CREAT SERPL-MCNC: 0.79 MG/DL (ref 0.57–1)
GFR SERPLBLD CREATININE-BSD FMLA CKD-EPI: 80 ML/MIN/1.73
GFR SERPLBLD CREATININE-BSD FMLA CKD-EPI: 92 ML/MIN/1.73
GLOBULIN SER CALC-MCNC: 2.2 G/DL (ref 1.5–4.5)
GLUCOSE SERPL-MCNC: 100 MG/DL (ref 65–99)
POTASSIUM SERPL-SCNC: 4.8 MMOL/L (ref 3.5–5.2)
PROT SERPL-MCNC: 6.5 G/DL (ref 6–8.5)
SODIUM SERPL-SCNC: 147 MMOL/L (ref 134–144)

## 2018-09-13 ENCOUNTER — OFFICE VISIT (OUTPATIENT)
Dept: ENDOCRINOLOGY | Age: 63
End: 2018-09-13

## 2018-09-13 VITALS
HEART RATE: 106 BPM | HEIGHT: 62 IN | DIASTOLIC BLOOD PRESSURE: 70 MMHG | BODY MASS INDEX: 40.85 KG/M2 | SYSTOLIC BLOOD PRESSURE: 110 MMHG | WEIGHT: 222 LBS

## 2018-09-13 DIAGNOSIS — R63.5 ABNORMAL WEIGHT GAIN: ICD-10-CM

## 2018-09-13 DIAGNOSIS — E05.90 HYPERTHYROIDISM: Primary | ICD-10-CM

## 2018-09-13 DIAGNOSIS — E04.2 NONTOXIC MULTINODULAR GOITER: ICD-10-CM

## 2018-09-13 PROCEDURE — 99214 OFFICE O/P EST MOD 30 MIN: CPT | Performed by: INTERNAL MEDICINE

## 2018-09-13 RX ORDER — HEPATITIS A VACCINE 1440 [IU]/ML
INJECTION, SUSPENSION INTRAMUSCULAR
COMMUNITY
Start: 2018-07-26 | End: 2018-11-29

## 2018-09-13 NOTE — PROGRESS NOTES
63 y.o.    Patient Care Team:  Aubrey Pitts MD as PCP - General (Family Medicine)  Bright Rodriguez MD as Consulting Physician (Psychiatry)    Chief Complaint:      F/U HYPERTHYROIDISM.  NO RECENT LABS.  Subjective     HPI   Patient is a 63-year-old white female with a history of hyperthyroidism and multinodular goiter came for follow-up    Hyperthyroidism  Patient actually stopped taking methimazole approximate the 6 months ago  She is not on any medication for thyroid  She also denies any symptoms suggestive of hyper or hypothyroidism  Patient had abnormal liver function testing hence methimazole was stopped and it was never restarted  Her bilirubin levels have started really coming down to normal after discharge from the hospital  Multinodular goiter  Patient has multiple nodules on the goiter on the ultrasound      Ultrasound was done during the hospitalization  Patient denies any difficulty swallowing or change in voice  Abnormal weight loss  Patient is not losing weight any further  For the past 6 months her weight has been stable around 222 pounds with a BMI of 40.6       Interval History of recent hospitalization    Patient is a 62-year-old white female admitted to the hospital through the emergency room for symptoms of nausea and anorexia and significant weight loss over the past several months  Patient apparently was following [employer] diet and was noticing nausea and also appetite and sleep problems.  She has been taking Ambien for sleep  She apparently lost nearly 40 pounds in the past 3 months.  Her nausea appears to be worsening and started vomiting yesterday hence she came to the emergency room.  She was noted to be in atrial fibrillation with rapid ventricular response and was started on Cardizem drip and admitted to the hospital  Patient is not treated with amiodarone.  Patient reports that she is on Topamax but denies any prior use of lithium or amiodarone  Patient does report to [facility]  intolerance, sweating and occasional tremors.  Insomnia has been a serious issue for the past few months  Patient is unaware of any thyroid dysfunction in the past  Patient also denies any recent contrast studies prior to admission     Patient denies any difficulty swallowing or change in voice  Patient is evaluated by cardiology and is currently on medication for atrial fibrillation  Further evaluation since admission suggested gallbladder sludge and stones but apparently not considered to be the etiology for nausea    The following portions of the patient's history were reviewed and updated as appropriate: allergies, current medications, past family history, past medical history, past social history, past surgical history and problem list.    Past Medical History:   Diagnosis Date   • Abnormal weight loss    • Anxiety    • Arthritis     OSTEOARTHRITIS RIGHT KNEE AND INDEX FINGERS   • Atrial fibrillation (CMS/HCC)    • Atrial fibrillation with RVR (CMS/HCC)    • Bipolar disorder (CMS/HCC)    • Cholelithiasis     uncompllicated   • Depression    • Hyperkalemia    • Hyperthyroidism 12/12/2017   • Hypokalemia    • Jaundice    • Mild mitral regurgitation    • Mild tricuspid regurgitation    • Nausea and vomiting    • BARBIE (obstructive sleep apnea)    • Sleep apnea    • Uterine mass     muliple masses, 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     Family History   Problem Relation Age of Onset   • Heart disease Mother    • Hypertension Mother    • Depression Mother    • Cancer Father    • Depression Sister    • Depression Brother    • Colon polyps Brother    • Depression Brother    • Depression Brother    • Depression Sister    • Vision loss Maternal Grandmother    • Vision loss Paternal Grandmother    • Breast cancer Maternal Aunt    • Breast cancer Maternal Aunt      Social History     Social History   • Marital status: Single     Spouse name: N/A   • Number of children: N/A   • Years of education: N/A     Occupational History   • Not on  file.     Social History Main Topics   • Smoking status: Former Smoker     Packs/day: 2.00     Years: 26.00   • Smokeless tobacco: Never Used   • Alcohol use No   • Drug use: No   • Sexual activity: No     Other Topics Concern   • Not on file     Social History Narrative   • No narrative on file     Allergies   Allergen Reactions   • Desyrel [Trazodone] Nausea And Vomiting   • Modafinil Other (See Comments)     Those manufactured in [place].  Weight gain, cough       Current Outpatient Prescriptions:   •  ALPRAZolam XR (XANAX XR) 0.5 MG 24 hr tablet, Take 0.5 mg by mouth 2 (Two) Times a Day As Needed for Anxiety., Disp: , Rfl:   •  Armodafinil (NUVIGIL) 250 MG tablet, Take 250 mg by mouth Daily., Disp: , Rfl:   •  aspirin 81 MG tablet, Take 1 tablet by mouth Daily., Disp: , Rfl:   •  Calcium 500-100 MG-UNIT chewable tablet, Chew 1 tablet Every 4 (Four) Hours As Needed., Disp: , Rfl:   •  Cholecalciferol (VITAMIN D) 2000 units capsule, Take 2,000 Units by mouth Daily., Disp: , Rfl:   •  clonazePAM (KlonoPIN) 0.5 MG tablet, Take 0.5 mg by mouth 2 (Two) Times a Day As Needed for Anxiety., Disp: , Rfl:   •  diltiaZEM CD (CARDIZEM CD) 120 MG 24 hr capsule, Take 1 capsule by mouth Daily., Disp: 90 capsule, Rfl: 3  •  metoprolol succinate XL (TOPROL-XL) 50 MG 24 hr tablet, Take 1 tablet by mouth Daily., Disp: 90 tablet, Rfl: 3  •  naproxen (NAPROSYN) 250 MG tablet, Take 250 mg by mouth Daily As Needed., Disp: , Rfl:   •  topiramate (TOPAMAX) 100 MG tablet, Take 100 mg by mouth Daily., Disp: , Rfl:   •  zolpidem CR (AMBIEN CR) 12.5 MG CR tablet, Take 12.5 mg by mouth At Night As Needed for Sleep., Disp: , Rfl:         Review of Systems   Constitutional: Negative for chills, fatigue and fever.   Cardiovascular: Positive for palpitations (AFIB). Negative for chest pain.   Gastrointestinal: Negative for abdominal pain, constipation, diarrhea, nausea and vomiting.   Endocrine: Negative for cold intolerance and heat intolerance.  "  All other systems reviewed and are negative.      Objective       Vitals:    09/13/18 1350   BP: 110/70   Pulse: 106   Weight: 101 kg (222 lb)   Height: 157.5 cm (62\")     Body mass index is 40.6 kg/m².      Physical Exam   Constitutional: She is oriented to person, place, and time.   Eyes: Pupils are equal, round, and reactive to light. EOM are normal.   Neck: Normal range of motion. Neck supple.   Cardiovascular: Normal rate, regular rhythm, normal heart sounds and intact distal pulses.    Pulmonary/Chest: Effort normal and breath sounds normal.   Abdominal: Soft. Bowel sounds are normal.   Neurological: She is oriented to person, place, and time.   Skin: Skin is warm and dry.   Psychiatric: She has a normal mood and affect. Her behavior is normal.   Nursing note and vitals reviewed.    Results Review:     I reviewed the patient's new clinical results.    Medical records reviewed  Summary:      Orders Only on 04/20/2018   Component Date Value Ref Range Status   • Glucose 04/20/2018 100* 65 - 99 mg/dL Final   • BUN 04/20/2018 14  8 - 27 mg/dL Final   • Creatinine 04/20/2018 0.79  0.57 - 1.00 mg/dL Final   • eGFR Non  Am 04/20/2018 80  >59 mL/min/1.73 Final   • eGFR African Am 04/20/2018 92  >59 mL/min/1.73 Final   • BUN/Creatinine Ratio 04/20/2018 18  12 - 28 Final   • Sodium 04/20/2018 147* 134 - 144 mmol/L Final   • Potassium 04/20/2018 4.8  3.5 - 5.2 mmol/L Final   • Chloride 04/20/2018 109* 96 - 106 mmol/L Final   • Total CO2 04/20/2018 23  18 - 29 mmol/L Final   • Calcium 04/20/2018 9.3  8.7 - 10.3 mg/dL Final   • Total Protein 04/20/2018 6.5  6.0 - 8.5 g/dL Final   • Albumin 04/20/2018 4.3  3.6 - 4.8 g/dL Final   • Globulin 04/20/2018 2.2  1.5 - 4.5 g/dL Final   • A/G Ratio 04/20/2018 2.0  1.2 - 2.2 Final   • Total Bilirubin 04/20/2018 0.8  0.0 - 1.2 mg/dL Final   • Alkaline Phosphatase 04/20/2018 127* 39 - 117 IU/L Final   • AST (SGOT) 04/20/2018 19  0 - 40 IU/L Final   • ALT (SGPT) 04/20/2018 17  0 " - 32 IU/L Final   • Stacia Smith CMP14 Default 04/20/2018 Comment   Final    Comment: A hand-written panel/profile was received from your office. In  accordance with the [facility] Ambiguous Test Code Policy dated July 2003, we have completed your order by using the closest currently  or formerly recognized AMA panel.  We have assigned Comprehensive  Metabolic Panel (14), Test Code #656016 to this request.  If this  is not the testing you wished to receive on this specimen, please  contact the [facility] Client Inquiry/Technical Services Department  to clarify the test order.  We appreciate your business.       Lab Results   Component Value Date    HGBA1C 5.70 (H) 11/09/2017     Lab Results   Component Value Date    CREATININE 0.79 04/20/2018     Imaging Results (most recent)     None          Assessment and Plan:    Bowen was seen today for hyperthyroidism.    Diagnoses and all orders for this visit:    Hyperthyroidism  -     T4, Free  -     TSH  -     T3  -     Thyroid Peroxidase Antibody    Nontoxic multinodular goiter    Abnormal weight gain    Patient denies any symptoms of hyperthyroidism at this point  She is not currently on any medication  She was on Tapazole in the past and had hyperbilirubinemia and had to be discontinued  Patient denies any dysphagia or change in voice  Her last ultrasound was approximately one year ago and she has subcentimeter nodules    Patient will get lab testing done today  After the lab results are reviewed patient will be notified of any further advise regarding medication  Patient will return to follow-up in 6 months.    The total time spent  was more than 25 min of which greater than 15 min of time ( greater than 50% of the total time )  was spent face to face with the patient counseling and coordination of care on recommended evaluation and treatment options, instructions for management/treatment and /or follow up  and importance of compliance with chosen management or treatment  "options  Roman Ppo MD. FACE    09/13/18      EMR Dragon / transcription disclaimer:     \"Dictated utilizing Dragon dictation\".         "

## 2018-09-14 LAB
T3 SERPL-MCNC: 122.7 NG/DL (ref 80–200)
T4 FREE SERPL-MCNC: 1.67 NG/DL (ref 0.93–1.7)
THYROPEROXIDASE AB SERPL-ACNC: 14 IU/ML (ref 0–34)
TSH SERPL DL<=0.005 MIU/L-ACNC: 1.32 MIU/ML (ref 0.27–4.2)

## 2018-11-14 ENCOUNTER — TRANSCRIBE ORDERS (OUTPATIENT)
Dept: ADMINISTRATIVE | Facility: HOSPITAL | Age: 63
End: 2018-11-14

## 2018-11-14 DIAGNOSIS — Z12.39 SCREENING BREAST EXAMINATION: Primary | ICD-10-CM

## 2018-11-29 ENCOUNTER — OFFICE VISIT (OUTPATIENT)
Dept: INTERNAL MEDICINE | Facility: CLINIC | Age: 63
End: 2018-11-29

## 2018-11-29 VITALS
SYSTOLIC BLOOD PRESSURE: 110 MMHG | DIASTOLIC BLOOD PRESSURE: 66 MMHG | OXYGEN SATURATION: 100 % | HEIGHT: 62 IN | HEART RATE: 79 BPM | BODY MASS INDEX: 43.47 KG/M2 | WEIGHT: 236.2 LBS

## 2018-11-29 DIAGNOSIS — Z13.29 SCREENING FOR THYROID DISORDER: ICD-10-CM

## 2018-11-29 DIAGNOSIS — Z00.00 MEDICARE ANNUAL WELLNESS VISIT, SUBSEQUENT: ICD-10-CM

## 2018-11-29 DIAGNOSIS — R06.02 SHORTNESS OF BREATH: ICD-10-CM

## 2018-11-29 DIAGNOSIS — Z13.220 SCREENING FOR LIPID DISORDERS: ICD-10-CM

## 2018-11-29 DIAGNOSIS — R73.9 ELEVATED SERUM GLUCOSE: ICD-10-CM

## 2018-11-29 DIAGNOSIS — Z11.59 ENCOUNTER FOR HEPATITIS C SCREENING TEST FOR LOW RISK PATIENT: ICD-10-CM

## 2018-11-29 DIAGNOSIS — Z13.1 SCREENING FOR DIABETES MELLITUS: ICD-10-CM

## 2018-11-29 DIAGNOSIS — Z12.11 ENCOUNTER FOR SCREENING COLONOSCOPY: ICD-10-CM

## 2018-11-29 DIAGNOSIS — Z00.00 WELL WOMAN EXAM (NO GYNECOLOGICAL EXAM): Primary | ICD-10-CM

## 2018-11-29 DIAGNOSIS — E55.9 VITAMIN D DEFICIENCY: ICD-10-CM

## 2018-11-29 PROCEDURE — G0439 PPPS, SUBSEQ VISIT: HCPCS | Performed by: FAMILY MEDICINE

## 2018-11-29 PROCEDURE — 3008F BODY MASS INDEX DOCD: CPT | Performed by: FAMILY MEDICINE

## 2018-11-29 PROCEDURE — 99213 OFFICE O/P EST LOW 20 MIN: CPT | Performed by: FAMILY MEDICINE

## 2018-11-29 PROCEDURE — 96160 PT-FOCUSED HLTH RISK ASSMT: CPT | Performed by: FAMILY MEDICINE

## 2018-11-29 NOTE — PROGRESS NOTES
Rola DOTSON Carlene is a 63 y.o. female and is here for a comprehensive physical exam. The patient reports no problems.    Pt is UTD with annual gyn exam and mammo     Patient notes to have vitamin D deficiency and has been taking 2000 IU daiily.     Patient notes that she has some shortness of breath. Has been going on for few months. She notes some congestion with mucous. On further questioning, patient does have an extensive smoking hx, even though she quit several years back. She denies any wheezing at this time.     Do you take any herbs or supplements that were not prescribed by a doctor? no      Social History:   Social History     Socioeconomic History   • Marital status: Single     Spouse name: Not on file   • Number of children: Not on file   • Years of education: Not on file   • Highest education level: Not on file   Social Needs   • Financial resource strain: Not on file   • Food insecurity - worry: Not on file   • Food insecurity - inability: Not on file   • Transportation needs - medical: Not on file   • Transportation needs - non-medical: Not on file   Occupational History   • Not on file   Tobacco Use   • Smoking status: Former Smoker     Packs/day: 2.00     Years: 26.00     Pack years: 52.00   • Smokeless tobacco: Never Used   Substance and Sexual Activity   • Alcohol use: No   • Drug use: No   • Sexual activity: No   Other Topics Concern   • Not on file   Social History Narrative   • Not on file       Family History:   Family History   Problem Relation Age of Onset   • Heart disease Mother    • Hypertension Mother    • Depression Mother    • Cancer Father    • Depression Sister    • Depression Brother    • Colon polyps Brother    • Depression Brother    • Depression Brother    • Depression Sister    • Vision loss Maternal Grandmother    • Vision loss Paternal Grandmother    • Breast cancer Maternal Aunt    • Breast cancer Maternal Aunt        Past Medical History:   Past Medical History:    Diagnosis Date   • Abnormal weight loss    • Anxiety    • Arthritis     OSTEOARTHRITIS RIGHT KNEE AND INDEX FINGERS   • Atrial fibrillation (CMS/HCC)    • Atrial fibrillation with RVR (CMS/HCC)    • Bipolar disorder (CMS/HCC)    • Cholelithiasis     uncompllicated   • Depression    • Hyperkalemia    • Hyperthyroidism 12/12/2017   • Hypokalemia    • Jaundice    • Mild mitral regurgitation    • Mild tricuspid regurgitation    • Nausea and vomiting    • BARBIE (obstructive sleep apnea)    • Sleep apnea    • Uterine mass     muliple masses, 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           Review of Systems    Review of Systems   Constitutional: Negative for chills and fever.   HENT: Negative for congestion, rhinorrhea, sinus pain and sore throat.    Eyes: Negative for photophobia and visual disturbance.   Respiratory: Positive for shortness of breath. Negative for cough and chest tightness.    Cardiovascular: Negative for chest pain and palpitations.   Gastrointestinal: Negative for diarrhea, nausea and vomiting.   Genitourinary: Negative for dysuria, frequency and urgency.   Skin: Negative for rash and wound.   Neurological: Negative for dizziness and syncope.   Psychiatric/Behavioral: Negative for behavioral problems and confusion.       Objective   Physical Exam   Constitutional: She is oriented to person, place, and time. She appears well-developed and well-nourished.   HENT:   Head: Normocephalic and atraumatic.   Right Ear: External ear normal.   Left Ear: External ear normal.   Mouth/Throat: Oropharynx is clear and moist.   Eyes: EOM are normal.   Neck: Normal range of motion. Neck supple.   Cardiovascular: Normal rate, regular rhythm and normal heart sounds.   Pulmonary/Chest: Effort normal and breath sounds normal. No respiratory distress.   Musculoskeletal: Normal range of motion.   Lymphadenopathy:     She has no cervical adenopathy.   Neurological: She is alert and oriented to person, place, and time.   Skin: Skin is warm.    Psychiatric: She has a normal mood and affect. Her behavior is normal.   Nursing note and vitals reviewed.      Medications:   Current Outpatient Medications:   •  ALPRAZolam XR (XANAX XR) 0.5 MG 24 hr tablet, Take 0.5 mg by mouth 2 (Two) Times a Day As Needed for Anxiety., Disp: , Rfl:   •  Armodafinil (NUVIGIL) 250 MG tablet, Take 250 mg by mouth Daily., Disp: , Rfl:   •  aspirin 81 MG tablet, Take 1 tablet by mouth Daily., Disp: , Rfl:   •  Brexpiprazole (REXULTI) 0.5 MG tablet, Take 1 tablet by mouth 3 (Three) Times a Week., Disp: , Rfl:   •  Calcium 500-100 MG-UNIT chewable tablet, Chew 1 tablet Every 4 (Four) Hours As Needed., Disp: , Rfl:   •  Cholecalciferol (VITAMIN D) 2000 units capsule, Take 2,000 Units by mouth Daily., Disp: , Rfl:   •  clonazePAM (KlonoPIN) 0.5 MG tablet, Take 0.5 mg by mouth 2 (Two) Times a Day As Needed for Anxiety., Disp: , Rfl:   •  metoprolol succinate XL (TOPROL-XL) 50 MG 24 hr tablet, Take 1 tablet by mouth Daily., Disp: 90 tablet, Rfl: 3  •  Multiple Vitamins-Minerals (PRESERVISION AREDS 2 PO), Take  by mouth., Disp: , Rfl:   •  naproxen (NAPROSYN) 250 MG tablet, Take 500 mg by mouth Daily As Needed., Disp: , Rfl:   •  topiramate (TOPAMAX) 100 MG tablet, Take 100 mg by mouth Daily., Disp: , Rfl:   •  zolpidem CR (AMBIEN CR) 12.5 MG CR tablet, Take 12.5 mg by mouth At Night As Needed for Sleep., Disp: , Rfl:   •  umeclidinium-vilanterol (ANORO ELLIPTA) 62.5-25 MCG/INH aerosol powder  inhaler, Inhale 1 puff Daily., Disp: 1 each, Rfl: 6       Assessment/Plan   Healthy female exam.      1. Healthcare Maintenance:  2. Patient Counseling:  --Nutrition: Stressed importance of moderation in sodium/caffeine intake, saturated fat and cholesterol, caloric balance, sufficient intake of fresh fruits, vegetables, fiber, calcium and vit D  --Exercise: Recommended 30 mins exercise.   --Immunizations reviewed.  --Discussed benefits of screening colonoscopy.    Diagnoses and all orders for this  visit:    Well woman exam (no gynecological exam)  -     Comprehensive Metabolic Panel  -     CBC & Differential    Medicare annual wellness visit, subsequent  -     Comprehensive Metabolic Panel  -     CBC & Differential    Shortness of breath  -     umeclidinium-vilanterol (ANORO ELLIPTA) 62.5-25 MCG/INH aerosol powder  inhaler; Inhale 1 puff Daily.  -     If no improvement, will consider pfts.     Screening for diabetes mellitus  -     Hemoglobin A1c    Screening for thyroid disorder  -     Thyroid Panel With TSH    Screening for lipid disorders  -     Lipid Panel With LDL / HDL Ratio    Elevated serum glucose  -     Hemoglobin A1c    Encounter for hepatitis C screening test for low risk patient  -     Hepatitis C Antibody    Encounter for screening colonoscopy  -     Ambulatory Referral For Screening Colonoscopy    Vitamin D deficiency  -     Vitamin D 25 Hydroxy  -     Continue 2000 IU vitamin D def.         No Follow-up on file.             Dictated utilizing Dragon Voice Recognition Software

## 2018-11-29 NOTE — PROGRESS NOTES
QUICK REFERENCE INFORMATION:  The ABCs of the Annual Wellness Visit    Subsequent Medicare Wellness Visit    HEALTH RISK ASSESSMENT    1955     Recent Hospitalizations:  Recently treated at the following:  [facility].        Current Medical Providers:  Patient Care Team:  Aubrey Pitts MD as PCP - General (Family Medicine)  Bright Rodriguez MD as Consulting Physician (Psychiatry)        Smoking Status:  Social History     Tobacco Use   Smoking Status Former Smoker   • Packs/day: 2.00   • Years: 26.00   • Pack years: 52.00   Smokeless Tobacco Never Used       Alcohol Consumption:  Social History     Substance and Sexual Activity   Alcohol Use No       Depression Screen:   PHQ-2/PHQ-9 Depression Screening 11/29/2018   Little interest or pleasure in doing things 0   Feeling down, depressed, or hopeless 0   Trouble falling or staying asleep, or sleeping too much 1   Feeling tired or having little energy 1   Poor appetite or overeating 0   Feeling bad about yourself - or that you are a failure or have let yourself or your family down 0   Trouble concentrating on things, such as reading the newspaper or watching television 0   Moving or speaking so slowly that other people could have noticed. Or the opposite - being so fidgety or restless that you have been moving around a lot more than usual 0   Thoughts that you would be better off dead, or of hurting yourself in some way 0   Total Score 2   If you checked off any problems, how difficult have these problems made it for you to do your work, take care of things at home, or get along with other people? -       Health Habits and Functional and Cognitive Screening:  Functional & Cognitive Status 11/29/2018   Do you have difficulty preparing food and eating? No   Do you have difficulty bathing yourself, getting dressed or grooming yourself? No   Do you have difficulty using the toilet? No   Do you have difficulty moving around from place to place? No    Do you have trouble with steps or getting out of a bed or a chair? No   In the past year have you fallen or experienced a near fall? Yes   Current Diet Well Balanced Diet   Dental Exam Up to date   Eye Exam Up to date   Exercise (times per week) 0 times per week   Current Exercise Activities Include None   Do you need help using the phone?  No   Are you deaf or do you have serious difficulty hearing?  No   Do you need help with transportation? No   Do you need help shopping? No   Do you need help preparing meals?  No   Do you need help with housework?  No   Do you need help with laundry? No   Do you need help taking your medications? No   Do you need help managing money? No   Do you ever drive or ride in a car without wearing a seat belt? No   Have you felt unusual stress, anger or loneliness in the last month? No   Who do you live with? Alone   If you need help, do you have trouble finding someone available to you? No   Have you been bothered in the last four weeks by sexual problems? No   Do you have difficulty concentrating, remembering or making decisions? No           Does the patient have evidence of cognitive impairment? No    Aspirin use counseling: Taking ASA appropriately as indicated      Recent Lab Results:  CMP:  Lab Results   Component Value Date     (H) 04/20/2018    BUN 14 04/20/2018    CREATININE 0.79 04/20/2018    EGFRIFNONA 80 04/20/2018    EGFRIFAFRI 92 04/20/2018    BCR 18 04/20/2018     (H) 04/20/2018    K 4.8 04/20/2018    CO2 23 04/20/2018    CALCIUM 9.3 04/20/2018    PROTENTOTREF 6.5 04/20/2018    ALBUMIN 4.3 04/20/2018    LABGLOBREF 2.2 04/20/2018    LABIL2 2.0 04/20/2018    BILITOT 0.8 04/20/2018    ALKPHOS 127 (H) 04/20/2018    AST 19 04/20/2018    ALT 17 04/20/2018     Lipid Panel:  Lab Results   Component Value Date    TRIG 66 11/09/2017    HDL 54 11/09/2017    VLDL 13.2 11/09/2017    LDLHDL 1.68 11/09/2017     HbA1c:  Lab Results   Component Value Date    HGBA1C 5.70 (H)  11/09/2017       Visual Acuity:  No exam data present    Age-appropriate Screening Schedule:  Refer to the list below for future screening recommendations based on patient's age, sex and/or medical conditions. Orders for these recommended tests are listed in the plan section. The patient has been provided with a written plan.    Health Maintenance   Topic Date Due   • ZOSTER VACCINE (2 of 3) 02/26/2015   • COLONOSCOPY  11/09/2017   • LIPID PANEL  11/09/2018   • MAMMOGRAM  11/22/2019   • PAP SMEAR  03/14/2021   • TDAP/TD VACCINES (2 - Td) 11/09/2027   • INFLUENZA VACCINE  Completed        Subjective   History of Present Illness    Bowen Concepcion is a 63 y.o. female who presents for an Subsequent Wellness Visit.    The following portions of the patient's history were reviewed and updated as appropriate: allergies, current medications, past family history, past medical history, past social history, past surgical history and problem list.    Outpatient Medications Prior to Visit   Medication Sig Dispense Refill   • ALPRAZolam XR (XANAX XR) 0.5 MG 24 hr tablet Take 0.5 mg by mouth 2 (Two) Times a Day As Needed for Anxiety.     • Armodafinil (NUVIGIL) 250 MG tablet Take 250 mg by mouth Daily.     • aspirin 81 MG tablet Take 1 tablet by mouth Daily.     • Brexpiprazole (REXULTI) 0.5 MG tablet Take 1 tablet by mouth 3 (Three) Times a Week.     • Calcium 500-100 MG-UNIT chewable tablet Chew 1 tablet Every 4 (Four) Hours As Needed.     • Cholecalciferol (VITAMIN D) 2000 units capsule Take 2,000 Units by mouth Daily.     • clonazePAM (KlonoPIN) 0.5 MG tablet Take 0.5 mg by mouth 2 (Two) Times a Day As Needed for Anxiety.     • metoprolol succinate XL (TOPROL-XL) 50 MG 24 hr tablet Take 1 tablet by mouth Daily. 90 tablet 3   • Multiple Vitamins-Minerals (PRESERVISION AREDS 2 PO) Take  by mouth.     • naproxen (NAPROSYN) 250 MG tablet Take 500 mg by mouth Daily As Needed.     • topiramate (TOPAMAX) 100 MG tablet Take 100 mg by  "mouth Daily.     • zolpidem CR (AMBIEN CR) 12.5 MG CR tablet Take 12.5 mg by mouth At Night As Needed for Sleep.     • HAVRIX 1440 EL U/ML vaccine        No facility-administered medications prior to visit.        Patient Active Problem List   Diagnosis   • Bipolar I disorder, single manic episode (CMS/HCC)   • Acid reflux   • HLD (hyperlipidemia)   • LBP (low back pain)   • PAF (paroxysmal atrial fibrillation) (CMS/HCC)   • BARBIE (obstructive sleep apnea)   • Nausea & vomiting   • Hypokalemia   • Abnormal weight loss   • Hyperthyroidism   • Visual changes   • Nontoxic multinodular goiter   • Jaundice of recent onset   • Anorexia   • Hyperbilirubinemia   • Drug induced liver disease   • Abnormal weight gain       Advance Care Planning:  has NO advance directive - not interested in additional information    Identification of Risk Factors:  Risk factors include: weight , unhealthy diet, cardiovascular risk, inactivity and polypharmacy.    Review of Systems    Compared to one year ago, the patient feels her physical health is better.  Compared to one year ago, the patient feels her mental health is worse.    Objective     Physical Exam    Vitals:    11/29/18 1102   BP: 110/66   Pulse: 79   SpO2: 100%   Weight: 107 kg (236 lb 3.2 oz)   Height: 157.5 cm (62\")   PainSc: 0-No pain       Patient's Body mass index is 43.2 kg/m². BMI is above normal parameters. Recommendations include: educational material, exercise counseling and nutrition counseling.      Assessment/Plan   Patient Self-Management and Personalized Health Advice  The patient has been provided with information about: diet, exercise, weight management, prevention of cardiac or vascular disease and designing advance directives and preventive services including:   · Advance directive, Colorectal cancer screening, colonoscopy referral placed, Counseling for cardiovascular disease risk reduction, Exercise counseling provided, Nutrition counseling provided.    Visit " Diagnoses:    ICD-10-CM ICD-9-CM   1. Well woman exam (no gynecological exam) Z00.00 V70.0   2. Medicare annual wellness visit, subsequent Z00.00 V70.0   3. Shortness of breath R06.02 786.05   4. Screening for diabetes mellitus Z13.1 V77.1   5. Screening for thyroid disorder Z13.29 V77.0   6. Screening for lipid disorders Z13.220 V77.91   7. Elevated serum glucose R73.9 790.29   8. Encounter for hepatitis C screening test for low risk patient Z11.59 V73.89   9. Encounter for screening colonoscopy Z12.11 V76.51       No orders of the defined types were placed in this encounter.      Outpatient Encounter Medications as of 11/29/2018   Medication Sig Dispense Refill   • ALPRAZolam XR (XANAX XR) 0.5 MG 24 hr tablet Take 0.5 mg by mouth 2 (Two) Times a Day As Needed for Anxiety.     • Armodafinil (NUVIGIL) 250 MG tablet Take 250 mg by mouth Daily.     • aspirin 81 MG tablet Take 1 tablet by mouth Daily.     • Brexpiprazole (REXULTI) 0.5 MG tablet Take 1 tablet by mouth 3 (Three) Times a Week.     • Calcium 500-100 MG-UNIT chewable tablet Chew 1 tablet Every 4 (Four) Hours As Needed.     • Cholecalciferol (VITAMIN D) 2000 units capsule Take 2,000 Units by mouth Daily.     • clonazePAM (KlonoPIN) 0.5 MG tablet Take 0.5 mg by mouth 2 (Two) Times a Day As Needed for Anxiety.     • metoprolol succinate XL (TOPROL-XL) 50 MG 24 hr tablet Take 1 tablet by mouth Daily. 90 tablet 3   • Multiple Vitamins-Minerals (PRESERVISION AREDS 2 PO) Take  by mouth.     • naproxen (NAPROSYN) 250 MG tablet Take 500 mg by mouth Daily As Needed.     • topiramate (TOPAMAX) 100 MG tablet Take 100 mg by mouth Daily.     • zolpidem CR (AMBIEN CR) 12.5 MG CR tablet Take 12.5 mg by mouth At Night As Needed for Sleep.     • [DISCONTINUED] HAVRIX 1440 EL U/ML vaccine        No facility-administered encounter medications on file as of 11/29/2018.        Reviewed use of high risk medication in the elderly: yes  Reviewed for potential of harmful drug  interactions in the elderly: yes    Follow Up:  No Follow-up on file.     An After Visit Summary and PPPS with all of these plans were given to the patient.

## 2018-11-30 LAB
25(OH)D3+25(OH)D2 SERPL-MCNC: 51.6 NG/ML (ref 30–100)
ALBUMIN SERPL-MCNC: 4.5 G/DL (ref 3.5–5.2)
ALBUMIN/GLOB SERPL: 2 G/DL
ALP SERPL-CCNC: 156 U/L (ref 39–117)
ALT SERPL-CCNC: 17 U/L (ref 1–33)
AST SERPL-CCNC: 23 U/L (ref 1–32)
BASOPHILS # BLD AUTO: 0.02 10*3/MM3 (ref 0–0.2)
BASOPHILS NFR BLD AUTO: 0.3 % (ref 0–1.5)
BILIRUB SERPL-MCNC: 0.5 MG/DL (ref 0.1–1.2)
BUN SERPL-MCNC: 17 MG/DL (ref 8–23)
BUN/CREAT SERPL: 23.6 (ref 7–25)
CALCIUM SERPL-MCNC: 9.4 MG/DL (ref 8.6–10.5)
CHLORIDE SERPL-SCNC: 109 MMOL/L (ref 98–107)
CHOLEST SERPL-MCNC: 206 MG/DL (ref 0–200)
CO2 SERPL-SCNC: 23.9 MMOL/L (ref 22–29)
CREAT SERPL-MCNC: 0.72 MG/DL (ref 0.57–1)
EOSINOPHIL # BLD AUTO: 0.32 10*3/MM3 (ref 0–0.7)
EOSINOPHIL NFR BLD AUTO: 4.2 % (ref 0.3–6.2)
ERYTHROCYTE [DISTWIDTH] IN BLOOD BY AUTOMATED COUNT: 14.8 % (ref 11.7–13)
FT4I SERPL CALC-MCNC: 2.2 (ref 1.2–4.9)
GLOBULIN SER CALC-MCNC: 2.2 GM/DL
GLUCOSE SERPL-MCNC: 86 MG/DL (ref 65–99)
HBA1C MFR BLD: 5.03 % (ref 4.8–5.6)
HCT VFR BLD AUTO: 43.5 % (ref 35.6–45.5)
HCV AB S/CO SERPL IA: 0.2 S/CO RATIO (ref 0–0.9)
HDLC SERPL-MCNC: 72 MG/DL (ref 40–60)
HGB BLD-MCNC: 13.6 G/DL (ref 11.9–15.5)
IMM GRANULOCYTES # BLD: 0.02 10*3/MM3 (ref 0–0.03)
IMM GRANULOCYTES NFR BLD: 0.3 % (ref 0–0.5)
LDLC SERPL CALC-MCNC: 122 MG/DL (ref 0–100)
LDLC/HDLC SERPL: 1.69 {RATIO}
LYMPHOCYTES # BLD AUTO: 2.72 10*3/MM3 (ref 0.9–4.8)
LYMPHOCYTES NFR BLD AUTO: 35.7 % (ref 19.6–45.3)
MCH RBC QN AUTO: 27.5 PG (ref 26.9–32)
MCHC RBC AUTO-ENTMCNC: 31.3 G/DL (ref 32.4–36.3)
MCV RBC AUTO: 87.9 FL (ref 80.5–98.2)
MONOCYTES # BLD AUTO: 0.61 10*3/MM3 (ref 0.2–1.2)
MONOCYTES NFR BLD AUTO: 8 % (ref 5–12)
NEUTROPHILS # BLD AUTO: 3.93 10*3/MM3 (ref 1.9–8.1)
NEUTROPHILS NFR BLD AUTO: 51.5 % (ref 42.7–76)
PLATELET # BLD AUTO: 208 10*3/MM3 (ref 140–500)
POTASSIUM SERPL-SCNC: 4.4 MMOL/L (ref 3.5–5.2)
PROT SERPL-MCNC: 6.7 G/DL (ref 6–8.5)
RBC # BLD AUTO: 4.95 10*6/MM3 (ref 3.9–5.2)
SODIUM SERPL-SCNC: 146 MMOL/L (ref 136–145)
T3RU NFR SERPL: 27 % (ref 24–39)
T4 SERPL-MCNC: 8.3 UG/DL (ref 4.5–12)
TRIGL SERPL-MCNC: 60 MG/DL (ref 0–150)
TSH SERPL DL<=0.005 MIU/L-ACNC: 1.64 UIU/ML (ref 0.45–4.5)
VLDLC SERPL CALC-MCNC: 12 MG/DL (ref 5–40)
WBC # BLD AUTO: 7.62 10*3/MM3 (ref 4.5–10.7)

## 2018-12-17 ENCOUNTER — TELEPHONE (OUTPATIENT)
Dept: INTERNAL MEDICINE | Facility: CLINIC | Age: 63
End: 2018-12-17

## 2018-12-17 DIAGNOSIS — E78.2 MIXED HYPERLIPIDEMIA: Primary | ICD-10-CM

## 2018-12-17 RX ORDER — ATORVASTATIN CALCIUM 20 MG/1
20 TABLET, FILM COATED ORAL NIGHTLY
Qty: 90 TABLET | Refills: 1 | Status: SHIPPED | OUTPATIENT
Start: 2018-12-17 | End: 2020-06-15

## 2018-12-17 NOTE — TELEPHONE ENCOUNTER
LVM- patient notified (ok per HIPAA). Patient advised to contact office if they have any questions. Prescription sent to [facility].

## 2018-12-17 NOTE — TELEPHONE ENCOUNTER
----- Message from Aubrey Pitts MD sent at 12/9/2018 11:48 AM EST -----  Elevated lipids, start atorvastatin 20mg daily.

## 2018-12-24 ENCOUNTER — HOSPITAL ENCOUNTER (OUTPATIENT)
Dept: MAMMOGRAPHY | Facility: HOSPITAL | Age: 63
Discharge: HOME | End: 2018-12-24
Admitting: FAMILY MEDICINE

## 2018-12-24 DIAGNOSIS — Z12.39 SCREENING BREAST EXAMINATION: ICD-10-CM

## 2018-12-24 PROCEDURE — 77067 SCR MAMMO BI INCL CAD: CPT

## 2018-12-24 PROCEDURE — 77063 BREAST TOMOSYNTHESIS BI: CPT

## 2019-02-28 ENCOUNTER — LAB (OUTPATIENT)
Dept: ENDOCRINOLOGY | Age: 64
End: 2019-02-28

## 2019-02-28 DIAGNOSIS — E05.90 HYPERTHYROIDISM: Primary | ICD-10-CM

## 2019-02-28 DIAGNOSIS — E04.2 NONTOXIC MULTINODULAR GOITER: ICD-10-CM

## 2019-02-28 DIAGNOSIS — E05.90 HYPERTHYROIDISM: ICD-10-CM

## 2019-03-01 LAB
ALBUMIN SERPL-MCNC: 4.1 G/DL (ref 3.5–5.2)
ALBUMIN/GLOB SERPL: 1.6 G/DL
ALP SERPL-CCNC: 132 U/L (ref 39–117)
ALT SERPL-CCNC: 14 U/L (ref 1–33)
AST SERPL-CCNC: 18 U/L (ref 1–32)
BILIRUB SERPL-MCNC: 0.3 MG/DL (ref 0.1–1.2)
BUN SERPL-MCNC: 17 MG/DL (ref 8–23)
BUN/CREAT SERPL: 22.1 (ref 7–25)
CALCIUM SERPL-MCNC: 9.3 MG/DL (ref 8.6–10.5)
CHLORIDE SERPL-SCNC: 113 MMOL/L (ref 98–107)
CO2 SERPL-SCNC: 21.8 MMOL/L (ref 22–29)
CREAT SERPL-MCNC: 0.77 MG/DL (ref 0.57–1)
GLOBULIN SER CALC-MCNC: 2.6 GM/DL
GLUCOSE SERPL-MCNC: 112 MG/DL (ref 65–99)
POTASSIUM SERPL-SCNC: 4.2 MMOL/L (ref 3.5–5.2)
PROT SERPL-MCNC: 6.7 G/DL (ref 6–8.5)
SODIUM SERPL-SCNC: 147 MMOL/L (ref 136–145)
T3 SERPL-MCNC: 110 NG/DL (ref 80–200)
T4 FREE SERPL-MCNC: 1.35 NG/DL (ref 0.93–1.7)
THYROPEROXIDASE AB SERPL-ACNC: 7 IU/ML (ref 0–34)
TSH SERPL DL<=0.005 MIU/L-ACNC: 1.34 MIU/ML (ref 0.27–4.2)

## 2019-03-14 ENCOUNTER — OFFICE VISIT (OUTPATIENT)
Dept: ENDOCRINOLOGY | Age: 64
End: 2019-03-14

## 2019-03-14 VITALS
HEIGHT: 62 IN | HEART RATE: 102 BPM | OXYGEN SATURATION: 97 % | SYSTOLIC BLOOD PRESSURE: 130 MMHG | BODY MASS INDEX: 45.27 KG/M2 | DIASTOLIC BLOOD PRESSURE: 70 MMHG | WEIGHT: 246 LBS

## 2019-03-14 DIAGNOSIS — E06.9 THYROIDITIS: ICD-10-CM

## 2019-03-14 DIAGNOSIS — R63.5 ABNORMAL WEIGHT GAIN: ICD-10-CM

## 2019-03-14 DIAGNOSIS — Z86.39 H/O HYPERTHYROIDISM: ICD-10-CM

## 2019-03-14 DIAGNOSIS — E04.2 NONTOXIC MULTINODULAR GOITER: Primary | ICD-10-CM

## 2019-03-14 PROCEDURE — 99214 OFFICE O/P EST MOD 30 MIN: CPT | Performed by: INTERNAL MEDICINE

## 2019-03-14 NOTE — PROGRESS NOTES
63 y.o.    Patient Care Team:  Aubrey Pitts MD as PCP - General (Family Medicine)  Bright Rodriguez MD as Consulting Physician (Psychiatry)    Chief Complaint:      F/U HYPERTHYROIDISM.  HERE TO DISCUSS LAB RESULTS.  Subjective     HPI   Patient is a 63-year-old white female with a past history of hyperthyroidism and multinodular goiter came for follow-up    Hyperthyroidism  Patient is currently not on any medication  Lab reports have been stable last visit  She denies any symptoms of hyper or hypothyroidism  After the abnormal liver function testing and hospitalization methimazole was discontinued  She was never restarted on any medication  Multinodular goiter  Patient had multiple subcentimeter nodules on ultrasound in the past  Patient denies any difficulty swallowing or change in voice  Weight gain  Patient gained nearly 30 pounds past 1 year  She has not been able to lose weight  She currently weighs 246 pounds with a BMI of 45        Interval History    Patient is a 62-year-old white female admitted to the hospital through the emergency room for symptoms of nausea and anorexia and significant weight loss over the past several months  Patient apparently was following [employer] diet and was noticing nausea and also appetite and sleep problems.  She has been taking Ambien for sleep  She apparently lost nearly 40 pounds in the past 3 months.  Her nausea appears to be worsening and started vomiting yesterday hence she came to the emergency room.  She was noted to be in atrial fibrillation with rapid ventricular response and was started on Cardizem drip and admitted to the hospital  Patient is not treated with amiodarone.  Patient reports that she is on Topamax but denies any prior use of lithium or amiodarone  Patient does report to heat intolerance, sweating and occasional tremors.  Insomnia has been a serious issue for the past few months  Patient is unaware of any thyroid dysfunction in the  past  Patient also denies any recent contrast studies prior to admission     Patient denies any difficulty swallowing or change in voice  Patient is evaluated by cardiology and is currently on medication for atrial fibrillation  Further evaluation since admission suggested gallbladder sludge and stones but apparently not considered to be the etiology for nausea           The following portions of the patient's history were reviewed and updated as appropriate: allergies, current medications, past family history, past medical history, past social history, past surgical history and problem list.    Past Medical History:   Diagnosis Date   • Abnormal weight loss    • Anxiety    • Arthritis     OSTEOARTHRITIS RIGHT KNEE AND INDEX FINGERS   • Atrial fibrillation (CMS/HCC)    • Atrial fibrillation with RVR (CMS/HCC)    • Bipolar disorder (CMS/HCC)    • Cholelithiasis     uncompllicated   • Depression    • Hyperkalemia    • Hyperthyroidism 12/12/2017   • Hypokalemia    • Jaundice    • Mild mitral regurgitation    • Mild tricuspid regurgitation    • Nausea and vomiting    • BARBIE (obstructive sleep apnea)    • Sleep apnea    • Uterine mass     muliple masses, 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     Family History   Problem Relation Age of Onset   • Heart disease Mother    • Hypertension Mother    • Depression Mother    • Cancer Father    • Depression Sister    • Depression Brother    • Colon polyps Brother    • Depression Brother    • Depression Brother    • Depression Sister    • Vision loss Maternal Grandmother    • Vision loss Paternal Grandmother    • Breast cancer Maternal Aunt    • Breast cancer Maternal Aunt      Social History     Socioeconomic History   • Marital status: Single     Spouse name: Not on file   • Number of children: Not on file   • Years of education: Not on file   • Highest education level: Not on file   Social Needs   • Financial resource strain: Not on file   • Food insecurity - worry: Not on file   • Food insecurity -  inability: Not on file   • Transportation needs - medical: Not on file   • Transportation needs - non-medical: Not on file   Occupational History   • Not on file   Tobacco Use   • Smoking status: Former Smoker     Packs/day: 2.00     Years: 26.00     Pack years: 52.00   • Smokeless tobacco: Never Used   Substance and Sexual Activity   • Alcohol use: No   • Drug use: No   • Sexual activity: No   Other Topics Concern   • Not on file   Social History Narrative   • Not on file     Allergies   Allergen Reactions   • Desyrel [Trazodone] Nausea And Vomiting   • Modafinil Other (See Comments)     Those manufactured in [place].  Weight gain, cough       Current Outpatient Medications:   •  ALPRAZolam XR (XANAX XR) 0.5 MG 24 hr tablet, Take 0.5 mg by mouth 2 (Two) Times a Day As Needed for Anxiety., Disp: , Rfl:   •  Armodafinil (NUVIGIL) 250 MG tablet, Take 250 mg by mouth Daily., Disp: , Rfl:   •  aspirin 81 MG tablet, Take 1 tablet by mouth Daily., Disp: , Rfl:   •  atorvastatin (LIPITOR) 20 MG tablet, Take 1 tablet by mouth Every Night., Disp: 90 tablet, Rfl: 1  •  Brexpiprazole (REXULTI) 0.5 MG tablet, Take 1 tablet by mouth 3 (Three) Times a Week., Disp: , Rfl:   •  Calcium 500-100 MG-UNIT chewable tablet, Chew 1 tablet Every 4 (Four) Hours As Needed., Disp: , Rfl:   •  Cholecalciferol (VITAMIN D) 2000 units capsule, Take 2,000 Units by mouth Daily., Disp: , Rfl:   •  clonazePAM (KlonoPIN) 0.5 MG tablet, Take 0.5 mg by mouth 2 (Two) Times a Day As Needed for Anxiety., Disp: , Rfl:   •  metoprolol succinate XL (TOPROL-XL) 50 MG 24 hr tablet, Take 1 tablet by mouth Daily., Disp: 90 tablet, Rfl: 3  •  Multiple Vitamins-Minerals (PRESERVISION AREDS 2 PO), Take  by mouth., Disp: , Rfl:   •  naproxen (NAPROSYN) 250 MG tablet, Take 500 mg by mouth Daily As Needed., Disp: , Rfl:   •  topiramate (TOPAMAX) 100 MG tablet, Take 100 mg by mouth Daily., Disp: , Rfl:   •  umeclidinium-vilanterol (ANORO ELLIPTA) 62.5-25 MCG/INH aerosol  "powder  inhaler, Inhale 1 puff Daily., Disp: 1 each, Rfl: 6  •  zolpidem CR (AMBIEN CR) 12.5 MG CR tablet, Take 12.5 mg by mouth At Night As Needed for Sleep., Disp: , Rfl:         Review of Systems   Constitutional: Negative for chills, fatigue and fever.   Cardiovascular: Negative for chest pain and palpitations.   Gastrointestinal: Negative for abdominal pain, constipation, diarrhea, nausea and vomiting.   Endocrine: Negative for cold intolerance and heat intolerance.   All other systems reviewed and are negative.      Objective       Vitals:    03/14/19 1331   BP: 130/70   Pulse: 102   SpO2: 97%   Weight: 112 kg (246 lb)   Height: 157.5 cm (62\")     Body mass index is 44.99 kg/m².      Physical Exam   Constitutional: She is oriented to person, place, and time.   Eyes: EOM are normal. Pupils are equal, round, and reactive to light.   Neck: Normal range of motion. Neck supple.   Cardiovascular: Normal rate, regular rhythm, normal heart sounds and intact distal pulses.   Pulmonary/Chest: Effort normal and breath sounds normal.   Abdominal: Soft. Bowel sounds are normal.   Neurological: She is oriented to person, place, and time.   Skin: Skin is warm and dry.   Psychiatric: She has a normal mood and affect. Her behavior is normal.   Nursing note and vitals reviewed.    Results Review:     I reviewed the patient's new clinical results.    Medical records reviewed  Summary:      Lab on 02/28/2019   Component Date Value Ref Range Status   • Free T4 02/28/2019 1.35  0.93 - 1.70 ng/dL Final   • TSH 02/28/2019 1.340  0.270 - 4.200 mIU/mL Final   • T3, Total 02/28/2019 110.0  80.0 - 200.0 ng/dl Final   • Thyroid Peroxidase Antibody 02/28/2019 7  0 - 34 IU/mL Final   • Glucose 02/28/2019 112* 65 - 99 mg/dL Final   • BUN 02/28/2019 17  8 - 23 mg/dL Final   • Creatinine 02/28/2019 0.77  0.57 - 1.00 mg/dL Final   • eGFR Non African Am 02/28/2019 75  >60 mL/min/1.73 Final   • eGFR African Am 02/28/2019 91  >60 mL/min/1.73 Final "   • BUN/Creatinine Ratio 02/28/2019 22.1  7.0 - 25.0 Final   • Sodium 02/28/2019 147* 136 - 145 mmol/L Final   • Potassium 02/28/2019 4.2  3.5 - 5.2 mmol/L Final   • Chloride 02/28/2019 113* 98 - 107 mmol/L Final   • Total CO2 02/28/2019 21.8* 22.0 - 29.0 mmol/L Final   • Calcium 02/28/2019 9.3  8.6 - 10.5 mg/dL Final   • Total Protein 02/28/2019 6.7  6.0 - 8.5 g/dL Final   • Albumin 02/28/2019 4.10  3.50 - 5.20 g/dL Final   • Globulin 02/28/2019 2.6  gm/dL Final   • A/G Ratio 02/28/2019 1.6  g/dL Final   • Total Bilirubin 02/28/2019 0.3  0.1 - 1.2 mg/dL Final   • Alkaline Phosphatase 02/28/2019 132* 39 - 117 U/L Final   • AST (SGOT) 02/28/2019 18  1 - 32 U/L Final   • ALT (SGPT) 02/28/2019 14  1 - 33 U/L Final     Lab Results   Component Value Date    HGBA1C 5.03 11/29/2018    HGBA1C 5.70 (H) 11/09/2017     Lab Results   Component Value Date    CREATININE 0.77 02/28/2019     Imaging Results (most recent)     None          Assessment and Plan:    Bowen was seen today for hypothyroidism.    Diagnoses and all orders for this visit:    Nontoxic multinodular goiter  -     US Thyroid; Future    Thyroiditis    H/O hyperthyroidism    Abnormal weight gain      Patient is currently not on any medication  She denies any symptoms of hyper or hypothyroidism  Her last ultrasound was October 2017  Patient has multinodular goiter with subcentimeter nodules    Patient denies any difficulty swallowing or change in voice  I recommend repeating the thyroid ultrasound in December 2019  Patient return to follow-up after the ultrasound    The total time spent  was more than 25 min of which greater than 15 min of time (greater than 50% of the total time)  was spent face to face with the patient counseling and coordination of care on recommended evaluation and treatment options, instructions for management/treatment and /or follow up and importance of compliance with chosen management or treatment options  Roman Pop MD.  "FACE    03/14/19      [facility] Dragon / transcription disclaimer:     \"Dictated utilizing Dragon dictation\".         "

## 2019-06-03 ENCOUNTER — OFFICE VISIT (OUTPATIENT)
Dept: CARDIOLOGY | Facility: CLINIC | Age: 64
End: 2019-06-03

## 2019-06-03 VITALS
DIASTOLIC BLOOD PRESSURE: 80 MMHG | WEIGHT: 243 LBS | SYSTOLIC BLOOD PRESSURE: 124 MMHG | BODY MASS INDEX: 44.72 KG/M2 | HEIGHT: 62 IN | HEART RATE: 80 BPM

## 2019-06-03 DIAGNOSIS — I48.20 CHRONIC ATRIAL FIBRILLATION (HCC): Primary | Chronic | ICD-10-CM

## 2019-06-03 DIAGNOSIS — E66.01 MORBID OBESITY WITH BMI OF 40.0-44.9, ADULT (HCC): Chronic | ICD-10-CM

## 2019-06-03 DIAGNOSIS — E66.01 MORBIDLY OBESE (HCC): ICD-10-CM

## 2019-06-03 PROCEDURE — 93000 ELECTROCARDIOGRAM COMPLETE: CPT | Performed by: INTERNAL MEDICINE

## 2019-06-03 PROCEDURE — 99214 OFFICE O/P EST MOD 30 MIN: CPT | Performed by: INTERNAL MEDICINE

## 2019-06-03 RX ORDER — METOPROLOL SUCCINATE 50 MG/1
50 TABLET, EXTENDED RELEASE ORAL DAILY
Qty: 90 TABLET | Refills: 3 | Status: SHIPPED | OUTPATIENT
Start: 2019-06-03 | End: 2020-06-18 | Stop reason: SDUPTHER

## 2019-11-01 ENCOUNTER — TRANSCRIBE ORDERS (OUTPATIENT)
Dept: ADMINISTRATIVE | Facility: HOSPITAL | Age: 64
End: 2019-11-01

## 2019-11-01 DIAGNOSIS — Z12.31 VISIT FOR SCREENING MAMMOGRAM: Primary | ICD-10-CM

## 2019-12-09 ENCOUNTER — HOSPITAL ENCOUNTER (OUTPATIENT)
Dept: ULTRASOUND IMAGING | Facility: HOSPITAL | Age: 64
Discharge: HOME | End: 2019-12-09
Admitting: INTERNAL MEDICINE

## 2019-12-09 DIAGNOSIS — E04.2 NONTOXIC MULTINODULAR GOITER: ICD-10-CM

## 2019-12-09 PROCEDURE — 76536 US EXAM OF HEAD AND NECK: CPT

## 2019-12-16 ENCOUNTER — OFFICE VISIT (OUTPATIENT)
Dept: ENDOCRINOLOGY | Age: 64
End: 2019-12-16

## 2019-12-16 VITALS
SYSTOLIC BLOOD PRESSURE: 116 MMHG | HEART RATE: 91 BPM | BODY MASS INDEX: 48.76 KG/M2 | WEIGHT: 265 LBS | HEIGHT: 62 IN | DIASTOLIC BLOOD PRESSURE: 68 MMHG

## 2019-12-16 DIAGNOSIS — E04.2 NONTOXIC MULTINODULAR GOITER: Primary | ICD-10-CM

## 2019-12-16 DIAGNOSIS — E06.9 THYROIDITIS: ICD-10-CM

## 2019-12-16 DIAGNOSIS — R63.5 ABNORMAL WEIGHT GAIN: ICD-10-CM

## 2019-12-16 PROCEDURE — 99214 OFFICE O/P EST MOD 30 MIN: CPT | Performed by: INTERNAL MEDICINE

## 2019-12-16 NOTE — PROGRESS NOTES
64 y.o.    Patient Care Team:  Aubrey Pitts MD as PCP - General (Family Medicine)  Bright Rodriguez MD as Consulting Physician (Psychiatry)    Chief Complaint:      Subjective     HPI   Patient is a 64-year-old white female with a history of multinodular goiter and hypothyroidism came for follow-up    Hypothyroidism  Patient is currently not on any medication  She has not done lab testing done yet  She denies however any symptoms of hyper or hypothyroidism  Patient had abnormal liver function testing after methimazole was used and was discontinued  Multinodular goiter  Patient had several subcentimeter nodules and she had a recent ultrasound and came for review  Abnormal weight gain  Patient currently weighs 265 pounds  She gained nearly 20 pounds in the past 3 months  She was stress eating      Interval History      The following portions of the patient's history were reviewed and updated as appropriate: allergies, current medications, past family history, past medical history, past social history, past surgical history and problem list.    Past Medical History:   Diagnosis Date   • Abnormal weight loss    • Anxiety    • Arthritis     OSTEOARTHRITIS RIGHT KNEE AND INDEX FINGERS   • Atrial fibrillation (CMS/HCC)    • Atrial fibrillation with RVR (CMS/[facility])    • Bipolar disorder (CMS/[facility])    • Cholelithiasis     uncompllicated   • Chronic atrial fibrillation 12/9/2017   • Depression    • Hyperkalemia    • Hyperthyroidism 12/12/2017   • Hypokalemia    • Jaundice    • Mild mitral regurgitation    • Mild tricuspid regurgitation    • Mixed hyperlipidemia    • Morbid obesity with BMI of 40.0-44.9, adult (CMS/HCC) 6/3/2019   • Nausea and vomiting    • BARBIE (obstructive sleep apnea)    • Sleep apnea    • Uterine mass     muliple masses, 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     Family History   Problem Relation Age of Onset   • Heart disease Mother    • Hypertension Mother    • Depression Mother    • Cancer Father    • Depression Sister     • Depression Brother    • Colon polyps Brother    • Depression Brother    • Depression Brother    • Depression Sister    • Vision loss Maternal Grandmother    • Vision loss Paternal Grandmother    • Breast cancer Maternal Aunt    • Breast cancer Maternal Aunt      Social History     Socioeconomic History   • Marital status: Single     Spouse name: Not on file   • Number of children: Not on file   • Years of education: Not on file   • Highest education level: Not on file   Tobacco Use   • Smoking status: Former Smoker     Packs/day: 2.00     Years: 26.00     Pack years: 52.00   • Smokeless tobacco: Never Used   Substance and Sexual Activity   • Alcohol use: No   • Drug use: No   • Sexual activity: Never     Allergies   Allergen Reactions   • Desyrel [Trazodone] Nausea And Vomiting   • Modafinil Other (See Comments)     Those manufactured in [place].  Weight gain, cough       Current Outpatient Medications:   •  Acetaminophen (TYLENOL EXTRA STRENGTH PO), Take 1 tablet by mouth As Needed., Disp: , Rfl:   •  ALPRAZolam XR (XANAX XR) 0.5 MG 24 hr tablet, Take 0.5 mg by mouth 2 (Two) Times a Day As Needed for Anxiety., Disp: , Rfl:   •  Armodafinil (NUVIGIL) 250 MG tablet, Take 250 mg by mouth Daily., Disp: , Rfl:   •  aspirin 81 MG tablet, Take 1 tablet by mouth Daily., Disp: , Rfl:   •  atorvastatin (LIPITOR) 20 MG tablet, Take 1 tablet by mouth Every Night., Disp: 90 tablet, Rfl: 1  •  Brexpiprazole (REXULTI) 0.5 MG tablet, Take 1 tablet by mouth 3 (Three) Times a Week., Disp: , Rfl:   •  Calcium 500-100 MG-UNIT chewable tablet, Chew 1 tablet Every 4 (Four) Hours As Needed., Disp: , Rfl:   •  Cholecalciferol (VITAMIN D) 2000 units capsule, Take 2,000 Units by mouth Daily., Disp: , Rfl:   •  clonazePAM (KlonoPIN) 0.5 MG tablet, Take 0.5 mg by mouth 2 (Two) Times a Day As Needed for Anxiety., Disp: , Rfl:   •  Ibuprofen (ADVIL PO), Take 1 tablet by mouth As Needed., Disp: , Rfl:   •  metoprolol succinate XL (TOPROL-XL)  "50 MG 24 hr tablet, Take 1 tablet by mouth Daily., Disp: 90 tablet, Rfl: 3  •  Multiple Vitamins-Minerals (PRESERVISION AREDS 2 PO), Take  by mouth., Disp: , Rfl:   •  naproxen (NAPROSYN) 250 MG tablet, Take 500 mg by mouth Daily As Needed., Disp: , Rfl:   •  topiramate (TOPAMAX) 100 MG tablet, Take 100 mg by mouth Daily., Disp: , Rfl:   •  umeclidinium-vilanterol (ANORO ELLIPTA) 62.5-25 MCG/INH aerosol powder  inhaler, Inhale 1 puff Daily., Disp: 1 each, Rfl: 6  •  zolpidem CR (AMBIEN CR) 12.5 MG CR tablet, Take 12.5 mg by mouth At Night As Needed for Sleep., Disp: , Rfl:         Review of Systems   Constitutional: Negative for chills, fatigue and fever.   Cardiovascular: Negative for chest pain and palpitations.   Gastrointestinal: Negative for abdominal pain, constipation, diarrhea, nausea and vomiting.   Endocrine: Negative for cold intolerance and heat intolerance.   All other systems reviewed and are negative.      Objective       Vitals:    12/16/19 1331   BP: 116/68   Pulse: 91   Weight: 120 kg (265 lb)   Height: 157.5 cm (62\")     Body mass index is 48.47 kg/m².      Physical Exam   Constitutional: She is oriented to person, place, and time.   Eyes: Pupils are equal, round, and reactive to light. EOM are normal.   Neck: Normal range of motion. Neck supple.   Cardiovascular: Normal rate, regular rhythm, normal heart sounds and intact distal pulses.   Pulmonary/Chest: Effort normal and breath sounds normal.   Abdominal: Soft. Bowel sounds are normal.   Neurological: She is oriented to person, place, and time.   Skin: Skin is warm and dry.   Psychiatric: She has a normal mood and affect. Her behavior is normal.   Nursing note and vitals reviewed.    Results Review:     I reviewed the patient's new clinical results.    Medical records reviewed  Summary:      Lab on 02/28/2019   Component Date Value Ref Range Status   • Free T4 02/28/2019 1.35  0.93 - 1.70 ng/dL Final   • TSH 02/28/2019 1.340  0.270 - 4.200 " mIU/mL Final   • T3, Total 02/28/2019 110.0  80.0 - 200.0 ng/dl Final   • Thyroid Peroxidase Antibody 02/28/2019 7  0 - 34 IU/mL Final   • Glucose 02/28/2019 112* 65 - 99 mg/dL Final   • BUN 02/28/2019 17  8 - 23 mg/dL Final   • Creatinine 02/28/2019 0.77  0.57 - 1.00 mg/dL Final   • eGFR Non African Am 02/28/2019 75  >60 mL/min/1.73 Final   • eGFR African Am 02/28/2019 91  >60 mL/min/1.73 Final   • BUN/Creatinine Ratio 02/28/2019 22.1  7.0 - 25.0 Final   • Sodium 02/28/2019 147* 136 - 145 mmol/L Final   • Potassium 02/28/2019 4.2  3.5 - 5.2 mmol/L Final   • Chloride 02/28/2019 113* 98 - 107 mmol/L Final   • Total CO2 02/28/2019 21.8* 22.0 - 29.0 mmol/L Final   • Calcium 02/28/2019 9.3  8.6 - 10.5 mg/dL Final   • Total Protein 02/28/2019 6.7  6.0 - 8.5 g/dL Final   • Albumin 02/28/2019 4.10  3.50 - 5.20 g/dL Final   • Globulin 02/28/2019 2.6  gm/dL Final   • A/G Ratio 02/28/2019 1.6  g/dL Final   • Total Bilirubin 02/28/2019 0.3  0.1 - 1.2 mg/dL Final   • Alkaline Phosphatase 02/28/2019 132* 39 - 117 U/L Final   • AST (SGOT) 02/28/2019 18  1 - 32 U/L Final   • ALT (SGPT) 02/28/2019 14  1 - 33 U/L Final     Lab Results   Component Value Date    HGBA1C 5.03 11/29/2018    HGBA1C 5.70 (H) 11/09/2017     Lab Results   Component Value Date    CREATININE 0.77 02/28/2019     Imaging Results (Most Recent)     None          Assessment and Plan:    Bowen was seen today for hyperthyroidism.    Diagnoses and all orders for this visit:    Nontoxic multinodular goiter  -     T4, Free; Future  -     TSH; Future  -     Thyroid Peroxidase Antibody; Future  -     US Thyroid; Future    Abnormal weight gain    Thyroiditis      Thyroid ultrasound images personally reviewed by me with the patient  Right lobe nodule 1.7 cm apparently increased from 1.1 cm 2 years ago  Patient also has other subcentimeter nodules in both lobes    I discussed at length current American thyroid Association guidelines for evaluating thyroid nodules  Patient  "wants to wait for 6 more months for a repeat ultrasound and then take further additions  Patient however denied any prior exposure to ionizing radiation or personal or family history of thyroid cancer    Patient will get lab testing done as a thyroid ultrasound before next visit in 6 months    Roman Pop MD. FACE    12/16/19      EMR Dragon / transcription disclaimer:     \"Dictated utilizing Dragon dictation\".         "

## 2019-12-26 ENCOUNTER — HOSPITAL ENCOUNTER (OUTPATIENT)
Dept: MAMMOGRAPHY | Facility: HOSPITAL | Age: 64
Discharge: HOME | End: 2019-12-26
Admitting: FAMILY MEDICINE

## 2019-12-26 DIAGNOSIS — Z12.31 VISIT FOR SCREENING MAMMOGRAM: ICD-10-CM

## 2019-12-26 PROCEDURE — 77067 SCR MAMMO BI INCL CAD: CPT

## 2019-12-26 PROCEDURE — 77063 BREAST TOMOSYNTHESIS BI: CPT

## 2020-06-09 ENCOUNTER — RESULTS ENCOUNTER (OUTPATIENT)
Dept: ENDOCRINOLOGY | Age: 65
End: 2020-06-09

## 2020-06-09 DIAGNOSIS — E04.2 NONTOXIC MULTINODULAR GOITER: ICD-10-CM

## 2020-06-15 ENCOUNTER — OFFICE VISIT (OUTPATIENT)
Dept: CARDIOLOGY | Facility: CLINIC | Age: 65
End: 2020-06-15

## 2020-06-15 VITALS — BODY MASS INDEX: 45.27 KG/M2 | WEIGHT: 246 LBS | HEIGHT: 62 IN

## 2020-06-15 DIAGNOSIS — I48.20 CHRONIC ATRIAL FIBRILLATION (HCC): Primary | Chronic | ICD-10-CM

## 2020-06-15 DIAGNOSIS — E66.01 MORBID OBESITY WITH BMI OF 40.0-44.9, ADULT (HCC): Chronic | ICD-10-CM

## 2020-06-15 DIAGNOSIS — E78.2 MIXED HYPERLIPIDEMIA: ICD-10-CM

## 2020-06-15 PROBLEM — E78.5 HLD (HYPERLIPIDEMIA): Chronic | Status: ACTIVE | Noted: 2017-11-09

## 2020-06-15 PROCEDURE — 99442 PR PHYS/QHP TELEPHONE EVALUATION 11-20 MIN: CPT | Performed by: INTERNAL MEDICINE

## 2020-06-15 NOTE — PROGRESS NOTES
Subjective:     Encounter Date: 6/15/2020      Patient ID: Bowen Concepcion is a 65 y.o. female.    Chief Complaint: PAF  History of Present Illness    Dear Dr. Pitts,    This patient has consented to a telehealth visit via audio. The visit was scheduled as a audio visit to comply with patient safety concerns in accordance with CDC recommendations.  All vitals recorded within this visit are reported by the patient.  I spent  19 minutes in total including but not limited to the 13 minutes spent in direct conversation with this patient.     She comes in for follow-up of her chronic atrial fibrillation.  She initially presented with hyperthyroidism, acute.  She was also found to have atrial fibrillation.  She also at that time was hospitalized for cholestatic hepatitis with her total bilirubin over 17.  She was not felt to be a candidate for anticoagulation nor antiarrhythmic therapy at the time, and rate control was utilized.  She has a CHADS Vascular score of 1, and has been maintained on aspirin since her liver function recovered.    She states she has been doing fine with no cardiac complaints.  She denies any chest pain, pressure, tightness, squeezing, or heartburn.  She has not experienced any feeling of palpitations, tachycardia or heart racing and no presyncope or syncope.  There have not been any problems with dizziness or lightheadedness.  There have not been any orthopnea or PND, and no problems with lower extremity edema.  She denies any shortness of breath at rest or with activity and has not had any wheezing.  She has not had any problems with unexplained nausea or vomiting. She has continued to perform daily activities of living without any specific problem or change in the level of activity.  She has not been recently hospitalized for any reason.        Past Medical History:   Diagnosis Date   • Abnormal weight loss    • Anxiety    • Arthritis     OSTEOARTHRITIS RIGHT KNEE AND INDEX FINGERS   •  Atrial fibrillation (CMS/[facility])    • Atrial fibrillation with RVR (CMS/[facility])    • Bipolar disorder (CMS/[facility])    • Cholelithiasis     uncompllicated   • Chronic atrial fibrillation (CMS/[facility]) 12/9/2017   • Depression    • Hyperkalemia    • Hyperthyroidism 12/12/2017   • Hypokalemia    • Jaundice    • Mild mitral regurgitation    • Mild tricuspid regurgitation    • Mixed hyperlipidemia    • Morbid obesity with BMI of 40.0-44.9, adult (CMS/[facility]) 6/3/2019   • Nausea and vomiting    • BARBIE (obstructive sleep apnea)    • Sleep apnea    • Uterine mass     muliple masses, 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       Past Surgical History:   Procedure Laterality Date   • COLONOSCOPY  2005   • UPPER GASTROINTESTINAL ENDOSCOPY  2001       Social History     Socioeconomic History   • Marital status: Single     Spouse name: Not on file   • Number of children: Not on file   • Years of education: Not on file   • Highest education level: Not on file   Tobacco Use   • Smoking status: Former Smoker     Packs/day: 2.00     Years: 26.00     Pack years: 52.00   • Smokeless tobacco: Never Used   Substance and Sexual Activity   • Alcohol use: No   • Drug use: No   • Sexual activity: Never       Review of Systems   Constitution: Negative for chills, decreased appetite, fever and night sweats.   HENT: Negative for ear discharge, ear pain, hearing loss, nosebleeds and sore throat.    Eyes: Negative for blurred vision, double vision and pain.   Cardiovascular: Negative for cyanosis.   Respiratory: Negative for hemoptysis and sputum production.    Endocrine: Negative for cold intolerance and heat intolerance.   Hematologic/Lymphatic: Negative for adenopathy.   Skin: Negative for dry skin, itching, nail changes, rash and suspicious lesions.   Musculoskeletal: Negative for arthritis, gout, muscle cramps, muscle weakness, myalgias and neck pain.   Gastrointestinal: Negative for anorexia, bowel incontinence, constipation, diarrhea, dysphagia, hematemesis and jaundice.  "  Genitourinary: Negative for bladder incontinence, dysuria, flank pain, frequency, hematuria and nocturia.   Neurological: Negative for focal weakness, numbness, paresthesias and seizures.   Psychiatric/Behavioral: Negative for altered mental status, hallucinations, hypervigilance, suicidal ideas and thoughts of violence. The patient is nervous/anxious.    Allergic/Immunologic: Negative for persistent infections.       Procedures       Objective:     Vitals:    06/15/20 1451   Weight: 112 kg (246 lb)   Height: 157.5 cm (62\")        Alert and oriented x3, thought processes normal, judgment normal, speaking in full sentences with no wheezing and no respiratory distress. Hearing is appropriate without difficulty.  Lab Review:             Performed        Assessment:          Diagnosis Plan   1. Chronic atrial fibrillation (CMS/HCC)     2. Mixed hyperlipidemia     3. Morbid obesity with BMI of 40.0-44.9, adult (CMS/[facility])            Plan:       1. Atrial Fibrillation and Atrial Flutter  Assessment  • The patient has paroxysmal atrial fibrillation  • This is non-valvular in etiology  • The transient or reversible cause of the patient's arrhythmia is hyperthyroidism  • The patient's CHADS2-VASc score is 1  • A [id number] score of 1 is considered an intermediate risk for a thromboembolic event  • Aspirin prescribed    Plan  • Continue aspirin for antithrombotic therapy, bleeding issues discussed  • Continue beta blocker for rhythm control    2.  Mixed hyperlipidemia-  3.  Cholestatic hepatitis, resolved  4.  Morbid obesity-we discussed weight loss; she has been working on this and is lost about 20 pounds since December.    Thank you very much for allowing us to participate in the care of this pleasant patient.  Please don't hesitate to call if I can be of assistance in any way.      Current Outpatient Medications:   •  Acetaminophen (TYLENOL EXTRA STRENGTH PO), Take 1 tablet by mouth As Needed., Disp: , Rfl:   •  " ALPRAZolam XR (XANAX XR) 0.5 MG 24 hr tablet, Take 0.5 mg by mouth 2 (Two) Times a Day As Needed for Anxiety., Disp: , Rfl:   •  Armodafinil (NUVIGIL) 250 MG tablet, Take 250 mg by mouth Daily., Disp: , Rfl:   •  aspirin 81 MG tablet, Take 1 tablet by mouth Daily., Disp: , Rfl:   •  Brexpiprazole (REXULTI) 0.5 MG tablet, Take 1 tablet by mouth 3 (Three) Times a Week., Disp: , Rfl:   •  Calcium 500-100 MG-UNIT chewable tablet, Chew 1 tablet Every 4 (Four) Hours As Needed., Disp: , Rfl:   •  Cholecalciferol (VITAMIN D) 2000 units capsule, Take 2,000 Units by mouth Daily., Disp: , Rfl:   •  clonazePAM (KlonoPIN) 0.5 MG tablet, Take 0.5 mg by mouth 2 (Two) Times a Day As Needed for Anxiety., Disp: , Rfl:   •  Ibuprofen (ADVIL PO), Take 1 tablet by mouth As Needed., Disp: , Rfl:   •  metoprolol succinate XL (TOPROL-XL) 50 MG 24 hr tablet, Take 1 tablet by mouth Daily., Disp: 90 tablet, Rfl: 3  •  Multiple Vitamins-Minerals (PRESERVISION AREDS 2 PO), Take 1 tablet by mouth Daily., Disp: , Rfl:   •  naproxen (NAPROSYN) 250 MG tablet, Take 500 mg by mouth Daily As Needed., Disp: , Rfl:   •  topiramate (TOPAMAX) 100 MG tablet, Take 100 mg by mouth Daily., Disp: , Rfl:   •  umeclidinium-vilanterol (ANORO ELLIPTA) 62.5-25 MCG/INH aerosol powder  inhaler, Inhale 1 puff Daily. (Patient taking differently: Inhale 1 puff As Needed.), Disp: 1 each, Rfl: 6  •  zolpidem CR (AMBIEN CR) 12.5 MG CR tablet, Take 12.5 mg by mouth At Night As Needed for Sleep., Disp: , Rfl:          EMR Dragon/Transcription disclaimer:    Much of this encounter note is an electronic transcription/translation of spoken language to printed text. The electronic translation of spoken language may permit erroneous, or at times, nonsensical words or phrases to be inadvertently transcribed; Although I have reviewed the note for such errors, some may still exist.

## 2020-06-18 DIAGNOSIS — I48.20 CHRONIC ATRIAL FIBRILLATION (HCC): Chronic | ICD-10-CM

## 2020-06-18 RX ORDER — METOPROLOL SUCCINATE 50 MG/1
50 TABLET, EXTENDED RELEASE ORAL DAILY
Qty: 90 TABLET | Refills: 3 | Status: SHIPPED | OUTPATIENT
Start: 2020-06-18 | End: 2021-07-19

## 2021-03-16 ENCOUNTER — BULK ORDERING (OUTPATIENT)
Dept: CASE MANAGEMENT | Facility: OTHER | Age: 66
End: 2021-03-16

## 2021-03-16 DIAGNOSIS — Z23 IMMUNIZATION DUE: ICD-10-CM

## 2021-03-22 ENCOUNTER — IMMUNIZATION (OUTPATIENT)
Dept: VACCINE CLINIC | Facility: HOSPITAL | Age: 66
End: 2021-03-22

## 2021-03-22 PROCEDURE — 0001A: CPT | Performed by: INTERNAL MEDICINE

## 2021-03-22 PROCEDURE — 91300 HC SARSCOV02 VAC 30MCG/0.3ML IM: CPT | Performed by: INTERNAL MEDICINE

## 2021-04-12 ENCOUNTER — IMMUNIZATION (OUTPATIENT)
Dept: VACCINE CLINIC | Facility: HOSPITAL | Age: 66
End: 2021-04-12

## 2021-04-12 PROCEDURE — 0002A: CPT | Performed by: INTERNAL MEDICINE

## 2021-04-12 PROCEDURE — 91300 HC SARSCOV02 VAC 30MCG/0.3ML IM: CPT | Performed by: INTERNAL MEDICINE

## 2021-05-28 ENCOUNTER — TRANSCRIBE ORDERS (OUTPATIENT)
Dept: ADMINISTRATIVE | Facility: HOSPITAL | Age: 66
End: 2021-05-28

## 2021-05-28 ENCOUNTER — TELEPHONE (OUTPATIENT)
Dept: INTERNAL MEDICINE | Facility: CLINIC | Age: 66
End: 2021-05-28

## 2021-05-28 DIAGNOSIS — R63.5 ABNORMAL WEIGHT GAIN: ICD-10-CM

## 2021-05-28 DIAGNOSIS — E05.90 HYPERTHYROIDISM: ICD-10-CM

## 2021-05-28 DIAGNOSIS — E55.9 VITAMIN D DEFICIENCY: ICD-10-CM

## 2021-05-28 DIAGNOSIS — E74.89 OTHER SPECIFIED DISORDERS OF CARBOHYDRATE METABOLISM (HCC): ICD-10-CM

## 2021-05-28 DIAGNOSIS — R63.4 ABNORMAL WEIGHT LOSS: ICD-10-CM

## 2021-05-28 DIAGNOSIS — E78.2 MIXED HYPERLIPIDEMIA: Primary | ICD-10-CM

## 2021-05-28 DIAGNOSIS — Z12.31 VISIT FOR SCREENING MAMMOGRAM: Primary | ICD-10-CM

## 2021-05-28 NOTE — TELEPHONE ENCOUNTER
Caller: Bowen Concepcion    Relationship: Self    Best call back number: [phone]    What orders are you requesting (i.e. lab or imaging): ROUTINE LAB WORK TO INCLUDE TSH     In what timeframe would the patient need to come in:   WEEK PRIOR TO 7/7

## 2021-05-28 NOTE — TELEPHONE ENCOUNTER
Ordered, pt to report to [address] 1 week prior to her appt.  Fasting labs.  Walk in only.  Hub can relay.

## 2021-06-08 ENCOUNTER — TELEPHONE (OUTPATIENT)
Dept: INTERNAL MEDICINE | Facility: CLINIC | Age: 66
End: 2021-06-08

## 2021-06-08 NOTE — TELEPHONE ENCOUNTER
PATIENT CALLING WANTING TO KNOW IF SHE NEEDS TO FAST FOR HER LABS.    PLEASE ADVISE  [phone]

## 2021-06-29 ENCOUNTER — LAB (OUTPATIENT)
Dept: LAB | Facility: HOSPITAL | Age: 66
End: 2021-06-29

## 2021-06-29 DIAGNOSIS — R63.4 ABNORMAL WEIGHT LOSS: ICD-10-CM

## 2021-06-29 DIAGNOSIS — E74.89 OTHER SPECIFIED DISORDERS OF CARBOHYDRATE METABOLISM (HCC): ICD-10-CM

## 2021-06-29 DIAGNOSIS — E05.90 HYPERTHYROIDISM: ICD-10-CM

## 2021-06-29 DIAGNOSIS — E55.9 VITAMIN D DEFICIENCY: ICD-10-CM

## 2021-06-29 DIAGNOSIS — E78.2 MIXED HYPERLIPIDEMIA: ICD-10-CM

## 2021-06-29 LAB
25(OH)D3 SERPL-MCNC: 62.1 NG/ML (ref 30–100)
ALBUMIN SERPL-MCNC: 4.1 G/DL (ref 3.5–5.2)
ALBUMIN/GLOB SERPL: 2.1 G/DL
ALP SERPL-CCNC: 95 U/L (ref 39–117)
ALT SERPL W P-5'-P-CCNC: 15 U/L (ref 1–33)
ANION GAP SERPL CALCULATED.3IONS-SCNC: 12 MMOL/L (ref 5–15)
AST SERPL-CCNC: 19 U/L (ref 1–32)
BASOPHILS # BLD AUTO: 0.06 10*3/MM3 (ref 0–0.2)
BASOPHILS NFR BLD AUTO: 0.9 % (ref 0–1.5)
BILIRUB SERPL-MCNC: 0.3 MG/DL (ref 0–1.2)
BUN SERPL-MCNC: 19 MG/DL (ref 8–23)
BUN/CREAT SERPL: 23.2 (ref 7–25)
CALCIUM SPEC-SCNC: 9.1 MG/DL (ref 8.6–10.5)
CHLORIDE SERPL-SCNC: 111 MMOL/L (ref 98–107)
CHOLEST SERPL-MCNC: 192 MG/DL (ref 0–200)
CO2 SERPL-SCNC: 21 MMOL/L (ref 22–29)
CREAT SERPL-MCNC: 0.82 MG/DL (ref 0.57–1)
DEPRECATED RDW RBC AUTO: 42 FL (ref 37–54)
EOSINOPHIL # BLD AUTO: 0.24 10*3/MM3 (ref 0–0.4)
EOSINOPHIL NFR BLD AUTO: 3.5 % (ref 0.3–6.2)
ERYTHROCYTE [DISTWIDTH] IN BLOOD BY AUTOMATED COUNT: 13.8 % (ref 12.3–15.4)
GFR SERPL CREATININE-BSD FRML MDRD: 70 ML/MIN/1.73
GLOBULIN UR ELPH-MCNC: 2 GM/DL
GLUCOSE SERPL-MCNC: 102 MG/DL (ref 65–99)
HBA1C MFR BLD: 5.48 % (ref 4.8–5.6)
HCT VFR BLD AUTO: 43.6 % (ref 34–46.6)
HDLC SERPL-MCNC: 56 MG/DL (ref 40–60)
HGB BLD-MCNC: 14.3 G/DL (ref 12–15.9)
IMM GRANULOCYTES # BLD AUTO: 0.03 10*3/MM3 (ref 0–0.05)
IMM GRANULOCYTES NFR BLD AUTO: 0.4 % (ref 0–0.5)
LDLC SERPL CALC-MCNC: 123 MG/DL (ref 0–100)
LDLC/HDLC SERPL: 2.19 {RATIO}
LYMPHOCYTES # BLD AUTO: 2.68 10*3/MM3 (ref 0.7–3.1)
LYMPHOCYTES NFR BLD AUTO: 39.5 % (ref 19.6–45.3)
MCH RBC QN AUTO: 27.6 PG (ref 26.6–33)
MCHC RBC AUTO-ENTMCNC: 32.8 G/DL (ref 31.5–35.7)
MCV RBC AUTO: 84 FL (ref 79–97)
MONOCYTES # BLD AUTO: 0.58 10*3/MM3 (ref 0.1–0.9)
MONOCYTES NFR BLD AUTO: 8.5 % (ref 5–12)
NEUTROPHILS NFR BLD AUTO: 3.2 10*3/MM3 (ref 1.7–7)
NEUTROPHILS NFR BLD AUTO: 47.2 % (ref 42.7–76)
NRBC BLD AUTO-RTO: 0 /100 WBC (ref 0–0.2)
PLATELET # BLD AUTO: 242 10*3/MM3 (ref 140–450)
PMV BLD AUTO: 11.1 FL (ref 6–12)
POTASSIUM SERPL-SCNC: 3.9 MMOL/L (ref 3.5–5.2)
PROT SERPL-MCNC: 6.1 G/DL (ref 6–8.5)
RBC # BLD AUTO: 5.19 10*6/MM3 (ref 3.77–5.28)
SODIUM SERPL-SCNC: 144 MMOL/L (ref 136–145)
T-UPTAKE NFR SERPL: 0.98 TBI (ref 0.8–1.3)
T4 SERPL-MCNC: 8.04 MCG/DL (ref 4.5–11.7)
TRIGL SERPL-MCNC: 68 MG/DL (ref 0–150)
TSH SERPL DL<=0.05 MIU/L-ACNC: 1.62 UIU/ML (ref 0.27–4.2)
VLDLC SERPL-MCNC: 13 MG/DL (ref 5–40)
WBC # BLD AUTO: 6.79 10*3/MM3 (ref 3.4–10.8)

## 2021-06-29 PROCEDURE — 80053 COMPREHEN METABOLIC PANEL: CPT

## 2021-06-29 PROCEDURE — 84436 ASSAY OF TOTAL THYROXINE: CPT

## 2021-06-29 PROCEDURE — 84479 ASSAY OF THYROID (T3 OR T4): CPT

## 2021-06-29 PROCEDURE — 86376 MICROSOMAL ANTIBODY EACH: CPT

## 2021-06-29 PROCEDURE — 83036 HEMOGLOBIN GLYCOSYLATED A1C: CPT

## 2021-06-29 PROCEDURE — 80061 LIPID PANEL: CPT

## 2021-06-29 PROCEDURE — 82306 VITAMIN D 25 HYDROXY: CPT

## 2021-06-29 PROCEDURE — 85025 COMPLETE CBC W/AUTO DIFF WBC: CPT

## 2021-06-29 PROCEDURE — 84443 ASSAY THYROID STIM HORMONE: CPT

## 2021-06-29 PROCEDURE — 36415 COLL VENOUS BLD VENIPUNCTURE: CPT

## 2021-06-30 LAB — THYROPEROXIDASE AB SERPL-ACNC: <9 IU/ML (ref 0–34)

## 2021-07-01 NOTE — PROGRESS NOTES
The labs were reviewed. Please inform patient that labs were normal except LDL is slightly elevated, but stable.

## 2021-07-07 ENCOUNTER — OFFICE VISIT (OUTPATIENT)
Dept: INTERNAL MEDICINE | Facility: CLINIC | Age: 66
End: 2021-07-07

## 2021-07-07 VITALS
SYSTOLIC BLOOD PRESSURE: 124 MMHG | DIASTOLIC BLOOD PRESSURE: 82 MMHG | RESPIRATION RATE: 18 BRPM | WEIGHT: 253.8 LBS | HEART RATE: 97 BPM | BODY MASS INDEX: 46.42 KG/M2 | OXYGEN SATURATION: 98 %

## 2021-07-07 DIAGNOSIS — Z09 ENCOUNTER FOR FOLLOW-UP EXAMINATION AFTER COMPLETED TREATMENT FOR CONDITIONS OTHER THAN MALIGNANT NEOPLASM: ICD-10-CM

## 2021-07-07 DIAGNOSIS — Z12.11 SCREEN FOR COLON CANCER: ICD-10-CM

## 2021-07-07 DIAGNOSIS — E78.2 MIXED HYPERLIPIDEMIA: ICD-10-CM

## 2021-07-07 DIAGNOSIS — Z13.820 SCREENING FOR OSTEOPOROSIS: ICD-10-CM

## 2021-07-07 DIAGNOSIS — Z00.00 MEDICARE ANNUAL WELLNESS VISIT, SUBSEQUENT: ICD-10-CM

## 2021-07-07 DIAGNOSIS — Z00.00 WELL WOMAN EXAM (NO GYNECOLOGICAL EXAM): Primary | ICD-10-CM

## 2021-07-07 DIAGNOSIS — Z00.00 HEALTHCARE MAINTENANCE: ICD-10-CM

## 2021-07-07 PROCEDURE — 99397 PER PM REEVAL EST PAT 65+ YR: CPT | Performed by: FAMILY MEDICINE

## 2021-07-07 PROCEDURE — G0439 PPPS, SUBSEQ VISIT: HCPCS | Performed by: FAMILY MEDICINE

## 2021-07-07 PROCEDURE — 96160 PT-FOCUSED HLTH RISK ASSMT: CPT | Performed by: FAMILY MEDICINE

## 2021-07-07 PROCEDURE — 1170F FXNL STATUS ASSESSED: CPT | Performed by: FAMILY MEDICINE

## 2021-07-07 PROCEDURE — 1160F RVW MEDS BY RX/DR IN RCRD: CPT | Performed by: FAMILY MEDICINE

## 2021-07-07 RX ORDER — PRAMIPEXOLE DIHYDROCHLORIDE 0.25 MG/1
0.25 TABLET ORAL 2 TIMES DAILY
COMMUNITY
Start: 2021-06-21

## 2021-07-07 RX ORDER — ATORVASTATIN CALCIUM 10 MG/1
10 TABLET, FILM COATED ORAL DAILY
Qty: 90 TABLET | Refills: 3 | Status: SHIPPED | OUTPATIENT
Start: 2021-07-07 | End: 2022-07-20 | Stop reason: SDUPTHER

## 2021-07-07 NOTE — PROGRESS NOTES
The ABCs of the Annual Wellness Visit  Subsequent Medicare Wellness Visit    Chief Complaint   Patient presents with   • Medicare Wellness-subsequent       Subjective   History of Present Illness:  Bowen Concepcion is a 66 y.o. female who presents for a Subsequent Medicare Wellness Visit.    HEALTH RISK ASSESSMENT    Recent Hospitalizations:  No hospitalization(s) within the last year.    Current Medical Providers:  Patient Care Team:  Aubrey Pitts MD as PCP - General (Family Medicine)  Bright Rodriguez MD as Consulting Physician (Psychiatry)    Smoking Status:  Social History     Tobacco Use   Smoking Status Former Smoker   • Packs/day: 2.00   • Years: 26.00   • Pack years: 52.00   • Quit date: 1997   • Years since quittin.8   Smokeless Tobacco Never Used       Alcohol Consumption:  Social History     Substance and Sexual Activity   Alcohol Use No       Depression Screen:   PHQ-2/PHQ-9 Depression Screening 2021   Little interest or pleasure in doing things 0   Feeling down, depressed, or hopeless 0   Trouble falling or staying asleep, or sleeping too much -   Feeling tired or having little energy -   Poor appetite or overeating -   Feeling bad about yourself - or that you are a failure or have let yourself or your family down -   Trouble concentrating on things, such as reading the newspaper or watching television -   Moving or speaking so slowly that other people could have noticed. Or the opposite - being so fidgety or restless that you have been moving around a lot more than usual -   Thoughts that you would be better off dead, or of hurting yourself in some way -   Total Score 0   If you checked off any problems, how difficult have these problems made it for you to do your work, take care of things at home, or get along with other people? -       Fall Risk Screen:  STEADI Fall Risk Assessment was completed, and patient is at LOW risk for falls.Assessment completed on:2021    Health  Habits and Functional and Cognitive Screening:  Functional & Cognitive Status 7/7/2021   Do you have difficulty preparing food and eating? No   Do you have difficulty bathing yourself, getting dressed or grooming yourself? No   Do you have difficulty using the toilet? No   Do you have difficulty moving around from place to place? No   Do you have trouble with steps or getting out of a bed or a chair? No   Current Diet Limited Junk Food   Dental Exam Up to date   Eye Exam Up to date   Exercise (times per week) 0 times per week   Current Exercises Include No Regular Exercise   Current Exercise Activities Include -   Do you need help using the phone?  No   Are you deaf or do you have serious difficulty hearing?  No   Do you need help with transportation? No   Do you need help shopping? No   Do you need help preparing meals?  No   Do you need help with housework?  No   Do you need help with laundry? No   Do you need help taking your medications? No   Do you need help managing money? No   Do you ever drive or ride in a car without wearing a seat belt? No   Have you felt unusual stress, anger or loneliness in the last month? No   Who do you live with? Alone   If you need help, do you have trouble finding someone available to you? No   Have you been bothered in the last four weeks by sexual problems? No   Do you have difficulty concentrating, remembering or making decisions? No         Does the patient have evidence of cognitive impairment? No    Asprin use counseling:Taking ASA appropriately as indicated    Age-appropriate Screening Schedule:  Refer to the list below for future screening recommendations based on patient's age, sex and/or medical conditions. Orders for these recommended tests are listed in the plan section. The patient has been provided with a written plan.    Health Maintenance   Topic Date Due   • ZOSTER VACCINE (2 of 3) 02/26/2015   • DXA SCAN  03/14/2020   • PAP SMEAR  03/14/2021   • INFLUENZA VACCINE   08/01/2021   • MAMMOGRAM  12/26/2021   • LIPID PANEL  06/29/2022   • TDAP/TD VACCINES (2 - Td or Tdap) 11/09/2027          The following portions of the patient's history were reviewed and updated as appropriate: allergies, current medications, past family history, past medical history, past social history, past surgical history and problem list.    Outpatient Medications Prior to Visit   Medication Sig Dispense Refill   • Armodafinil (NUVIGIL) 250 MG tablet Take 250 mg by mouth Daily.     • aspirin 81 MG tablet Take 1 tablet by mouth Daily.     • Calcium 500-100 MG-UNIT chewable tablet Chew 1 tablet Every 4 (Four) Hours As Needed.     • Cholecalciferol (VITAMIN D) 2000 units capsule Take 2,000 Units by mouth Daily.     • clonazePAM (KlonoPIN) 0.5 MG tablet Take 0.5 mg by mouth 2 (Two) Times a Day As Needed for Anxiety.     • Ibuprofen (ADVIL PO) Take 1 tablet by mouth As Needed.     • metoprolol succinate XL (TOPROL-XL) 50 MG 24 hr tablet Take 1 tablet by mouth Daily. 90 tablet 3   • Multiple Vitamins-Minerals (PRESERVISION AREDS 2 PO) Take 1 tablet by mouth Daily.     • naproxen (NAPROSYN) 250 MG tablet Take 500 mg by mouth Daily As Needed.     • pramipexole (MIRAPEX) 0.25 MG tablet      • topiramate (TOPAMAX) 100 MG tablet Take 100 mg by mouth Daily.     • Acetaminophen (TYLENOL EXTRA STRENGTH PO) Take 1 tablet by mouth As Needed.     • ALPRAZolam XR (XANAX XR) 0.5 MG 24 hr tablet Take 0.5 mg by mouth 2 (Two) Times a Day As Needed for Anxiety.     • Brexpiprazole (REXULTI) 0.5 MG tablet Take 1 tablet by mouth 3 (Three) Times a Week.     • umeclidinium-vilanterol (ANORO ELLIPTA) 62.5-25 MCG/INH aerosol powder  inhaler Inhale 1 puff Daily. (Patient taking differently: Inhale 1 puff As Needed.) 1 each 6   • zolpidem CR (AMBIEN CR) 12.5 MG CR tablet Take 12.5 mg by mouth At Night As Needed for Sleep.       No facility-administered medications prior to visit.       Patient Active Problem List   Diagnosis   • Bipolar I  disorder, single manic episode (CMS/[facility])   • Acid reflux   • HLD (hyperlipidemia)   • LBP (low back pain)   • Chronic atrial fibrillation (CMS/[facility])   • BARBIE (obstructive sleep apnea)   • Nausea & vomiting   • Hypokalemia   • Abnormal weight loss   • Hyperthyroidism   • Visual changes   • Nontoxic multinodular goiter   • Jaundice of recent onset   • Anorexia   • Hyperbilirubinemia   • Drug induced liver disease   • Abnormal weight gain   • Vitamin D deficiency   • Shortness of breath   • Thyroiditis   • H/O hyperthyroidism   • Morbid obesity with BMI of 40.0-44.9, adult (CMS/[facility])       Advanced Care Planning:  ACP discussion was held with the patient during this visit. Patient does not have an advance directive, declines further assistance.    Review of Systems    Compared to one year ago, the patient feels her physical health is worse.  Compared to one year ago, the patient feels her mental health is worse.    Reviewed chart for potential of high risk medication in the elderly: yes  Reviewed chart for potential of harmful drug interactions in the elderly:yes    Objective         Vitals:    07/07/21 1109   BP: 124/82   Pulse: 97   Resp: 18   SpO2: 98%   Weight: 115 kg (253 lb 12.8 oz)       Body mass index is 46.42 kg/m².  Discussed the patient's BMI with her. The BMI is above average; BMI management plan is completed.    Physical Exam    Lab Results   Component Value Date    TRIG 68 06/29/2021    HDL 56 06/29/2021     (H) 06/29/2021    VLDL 13 06/29/2021    HGBA1C 5.48 06/29/2021        Assessment/Plan   Medicare Risks and Personalized Health Plan  CMS Preventative Services Quick Reference  Advance Directive Discussion  Cardiovascular risk    The above risks/problems have been discussed with the patient.  Pertinent information has been shared with the patient in the After Visit Summary.  Follow up plans and orders are seen below in the Assessment/Plan Section.    Diagnoses and all orders for this visit:    1.  Well woman exam (no gynecological exam) (Primary)    2. Medicare annual wellness visit, subsequent    3. Healthcare maintenance  -     DEXA Bone Density Axial  -     CBC & Differential  -     Comprehensive Metabolic Panel  -     Hemoglobin A1c  -     Thyroid Panel With TSH  -     Lipid Panel With LDL / HDL Ratio  -     Hepatitis C Antibody    4. Screening for osteoporosis  -     DEXA Bone Density Axial    5. Screen for colon cancer  -     Ambulatory Referral For Screening Colonoscopy    6. Encounter for follow-up examination after completed treatment for conditions other than malignant neoplasm   -     DEXA Bone Density Axial    7. Mixed hyperlipidemia  -     atorvastatin (LIPITOR) 10 MG tablet; Take 1 tablet by mouth Daily.  Dispense: 90 tablet; Refill: 3      Follow Up:  No follow-ups on file.     An After Visit Summary and PPPS were given to the patient.

## 2021-07-12 ENCOUNTER — TELEPHONE (OUTPATIENT)
Dept: GASTROENTEROLOGY | Facility: CLINIC | Age: 66
End: 2021-07-12

## 2021-07-19 ENCOUNTER — OFFICE VISIT (OUTPATIENT)
Dept: CARDIOLOGY | Facility: CLINIC | Age: 66
End: 2021-07-19

## 2021-07-19 VITALS
BODY MASS INDEX: 46.46 KG/M2 | HEIGHT: 62 IN | HEART RATE: 103 BPM | SYSTOLIC BLOOD PRESSURE: 118 MMHG | WEIGHT: 252.5 LBS | DIASTOLIC BLOOD PRESSURE: 80 MMHG

## 2021-07-19 DIAGNOSIS — I48.20 CHRONIC ATRIAL FIBRILLATION (HCC): Primary | Chronic | ICD-10-CM

## 2021-07-19 DIAGNOSIS — E78.2 MIXED HYPERLIPIDEMIA: Chronic | ICD-10-CM

## 2021-07-19 DIAGNOSIS — E66.01 MORBID OBESITY WITH BMI OF 40.0-44.9, ADULT (HCC): Chronic | ICD-10-CM

## 2021-07-19 DIAGNOSIS — G47.33 OSA (OBSTRUCTIVE SLEEP APNEA): ICD-10-CM

## 2021-07-19 PROCEDURE — 93000 ELECTROCARDIOGRAM COMPLETE: CPT | Performed by: INTERNAL MEDICINE

## 2021-07-19 PROCEDURE — 99214 OFFICE O/P EST MOD 30 MIN: CPT | Performed by: INTERNAL MEDICINE

## 2021-07-19 RX ORDER — METOPROLOL SUCCINATE 100 MG/1
100 TABLET, EXTENDED RELEASE ORAL DAILY
Qty: 90 TABLET | Refills: 3 | Status: SHIPPED | OUTPATIENT
Start: 2021-07-19 | End: 2021-07-23 | Stop reason: SDUPTHER

## 2021-07-19 NOTE — PROGRESS NOTES
Subjective:     Encounter Date: 7/19/2021      Patient ID: Bowen Concepcion is a 66 y.o. female.    Chief Complaint: PAF  History of Present Illness    Dear Dr. Pitts,    She comes in for follow-up of her chronic atrial fibrillation.  She initially presented with hyperthyroidism, acute.  She was also found to have atrial fibrillation.  She also at that time was hospitalized for cholestatic hepatitis with her total bilirubin over 17.  She was not felt to be a candidate for anticoagulation nor antiarrhythmic therapy at the time, and rate control was utilized.  She had a CHADS Vascular score of 1, and has been maintained on aspirin since her liver function recovered-she decided to remain on aspirin after 64.    She has been noticing that it seems like sometimes her heart rate seems to be speeding up a little bit more than it was before.  She is noted that she gets a little more fatigued than she used to.  She says it seemed that when she gained some weight that she started noticing the heart rate was starting to go up a little bit more than in the past.  She has had some fatigue.  No lower extremity edema.  No orthopnea or PND.  No chills or fevers.        Past Medical History:   Diagnosis Date   • Abnormal weight loss    • Anxiety    • Arthritis     OSTEOARTHRITIS RIGHT KNEE AND INDEX FINGERS   • Atrial fibrillation (CMS/HCC)    • Atrial fibrillation with RVR (CMS/HCC)    • Bipolar disorder (CMS/HCC)    • Cataract 2015    developing   • Cholelithiasis     uncompllicated   • Chronic atrial fibrillation (CMS/HCC) 12/9/2017   • Depression    • GERD (gastroesophageal reflux disease)     with spicy food   • Headache     when I am stressed   • Hyperkalemia    • Hyperthyroidism 12/12/2017   • Hypokalemia    • Jaundice    • Low back pain 2013    seems to get worse with age   • Mild mitral regurgitation    • Mild tricuspid regurgitation    • Mixed hyperlipidemia    • Morbid obesity with BMI of 40.0-44.9, adult (CMS/HCC)  "6/3/2019   • Nausea and vomiting    • BARBIE (obstructive sleep apnea)    • Osteopenia osteopenia   • Scoliosis    • Sleep apnea    • Uterine mass     muliple masses, 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   • Visual impairment cataracts developing       Past Surgical History:   Procedure Laterality Date   • COLONOSCOPY     • UPPER GASTROINTESTINAL ENDOSCOPY         Social History     Socioeconomic History   • Marital status: Single     Spouse name: Not on file   • Number of children: Not on file   • Years of education: Not on file   • Highest education level: Not on file   Tobacco Use   • Smoking status: Former Smoker     Packs/day: 2.00     Years: 26.00     Pack years: 52.00     Quit date: 1997     Years since quittin.8   • Smokeless tobacco: Never Used   • Tobacco comment: caffeine use- tea   Substance and Sexual Activity   • Alcohol use: No   • Drug use: No   • Sexual activity: Never           ECG 12 Lead    Date/Time: 2021 3:37 PM  Performed by: Victoriano Murdock III, MD  Authorized by: Victoriano Murdock III, MD   Comparison: compared with previous ECG   Similar to previous ECG  Rhythm: atrial fibrillation  Rate: tachycardic  Conduction: conduction normal  ST Segments: ST segments normal  T Waves: T waves normal  QRS axis: normal  Other findings: non-specific ST-T wave changes    Clinical impression: abnormal EKG               Objective:     Vitals:    21 1336   BP: 118/80   BP Location: Left arm   Pulse: 103   Weight: 115 kg (252 lb 8 oz)   Height: 157.5 cm (62\")   Body mass index is 46.18 kg/m².'        General Appearance:    Alert, cooperative, in no acute distress   Head:    Normocephalic, without obvious abnormality, atraumatic   Eyes:            Lids and lashes normal, conjunctivae and sclerae normal, no   icterus, no pallor, corneas clear, PERRLA   Ears:    Ears appear intact with no abnormalities noted   Throat:   No oral lesions, no thrush, oral mucosa moist   Neck:   No adenopathy, supple, trachea " midline, no thyromegaly, no   carotid bruit, no JVD   Back:     No kyphosis present, no scoliosis present, no skin lesions, erythema or scars, no tenderness to percussion or palpation, range of motion normal   Lungs:     Clear to auscultation,respirations regular, even and unlabored    Heart:    irregular rhythm and normal rate, normal S1 and S2, no murmur, no gallop, no rub, no click   Chest Wall:    No abnormalities observed   Abdomen:     Normal bowel sounds, no masses, no organomegaly, soft        non-tender, non-distended, no guarding, no rebound  tenderness   Extremities:   Moves all extremities well, no edema, no cyanosis, no redness   Pulses:   Pulses palpable and equal bilaterally. Normal radial, carotid, femoral, dorsalis pedis and posterior tibial pulses bilaterally. Normal abdominal aorta   Skin:  Psychiatric:   No bleeding, bruising or rash    Alert and oriented x 3, normal mood and affect     CHADS-VASc Risk Assessment            2 Total Score    1 Age 65-74    1 Sex: Female        Criteria that do not apply:    CHF    Hypertension    Age >/= 75    DM    PRIOR STROKE/TIA/THROMBO    Vascular Disease            Lab Review:             Lab Results   Component Value Date    GLUCOSE 102 (H) 06/29/2021    BUN 19 06/29/2021    CREATININE 0.82 06/29/2021    EGFRIFNONA 70 06/29/2021    EGFRIFAFRI 91 02/28/2019    BCR 23.2 06/29/2021    K 3.9 06/29/2021    CO2 21.0 (L) 06/29/2021    CALCIUM 9.1 06/29/2021    PROTENTOTREF 6.7 02/28/2019    ALBUMIN 4.10 06/29/2021    LABIL2 1.6 02/28/2019    AST 19 06/29/2021    ALT 15 06/29/2021             Assessment:          Diagnosis Plan   1. Chronic atrial fibrillation (CMS/HCC)  metoprolol succinate XL (TOPROL-XL) 100 MG 24 hr tablet   2. Mixed hyperlipidemia     3. Morbid obesity with BMI of 40.0-44.9, adult (CMS/HCC)     4. BARBIE (obstructive sleep apnea)            Plan:       1. Atrial Fibrillation and Atrial Flutter  Assessment  • The patient has paroxysmal atrial  fibrillation  • This is non-valvular in etiology  • The transient or reversible cause of the patient's arrhythmia is hyperthyroidism  • The patient's CHADS2-VASc score is 2  • A [id number] score of 2 or more is considered a high risk for a thromboembolic event  • Aspirin prescribed    Plan  • Continue aspirin for antithrombotic therapy, bleeding issues discussed  • Continue beta blocker for rhythm control  • Resting heart rate is 103, I will increase her metoprolol succinate from 50 mg daily to 100 mg daily, patient had previously decided to stay on aspirin    2.  Mixed hyperlipidemia-continue lipid-lowering therapy with atorvastatin, AST ALT reviewed  3.  Cholestatic hepatitis, resolved  4.  Morbid obesity-she has gained back some of the weight that she has lost    Thank you very much for allowing us to participate in the care of this pleasant patient.  Please don't hesitate to call if I can be of assistance in any way.      Current Outpatient Medications:   •  Armodafinil (NUVIGIL) 250 MG tablet, Take 250 mg by mouth Daily., Disp: , Rfl:   •  aspirin 81 MG tablet, Take 1 tablet by mouth Daily., Disp: , Rfl:   •  atorvastatin (LIPITOR) 10 MG tablet, Take 1 tablet by mouth Daily., Disp: 90 tablet, Rfl: 3  •  Calcium 500-100 MG-UNIT chewable tablet, Chew 1 tablet Every 4 (Four) Hours As Needed., Disp: , Rfl:   •  Cholecalciferol (VITAMIN D) 2000 units capsule, Take 2,000 Units by mouth Daily., Disp: , Rfl:   •  clonazePAM (KlonoPIN) 0.5 MG tablet, Take 0.5 mg by mouth 2 (Two) Times a Day As Needed for Anxiety., Disp: , Rfl:   •  Ibuprofen (ADVIL PO), Take 1 tablet by mouth As Needed., Disp: , Rfl:   •  metoprolol succinate XL (TOPROL-XL) 100 MG 24 hr tablet, Take 1 tablet by mouth Daily., Disp: 90 tablet, Rfl: 3  •  Multiple Vitamins-Minerals (PRESERVISION AREDS 2 PO), Take 1 tablet by mouth Daily., Disp: , Rfl:   •  naproxen (NAPROSYN) 250 MG tablet, Take 500 mg by mouth Daily As Needed., Disp: , Rfl:   •   pramipexole (MIRAPEX) 0.25 MG tablet, , Disp: , Rfl:   •  topiramate (TOPAMAX) 100 MG tablet, Take 100 mg by mouth Daily., Disp: , Rfl:        Return in about 1 year (around 7/19/2022).

## 2021-07-23 DIAGNOSIS — I48.20 CHRONIC ATRIAL FIBRILLATION (HCC): Chronic | ICD-10-CM

## 2021-07-23 RX ORDER — METOPROLOL SUCCINATE 100 MG/1
100 TABLET, EXTENDED RELEASE ORAL DAILY
Qty: 90 TABLET | Refills: 3 | Status: SHIPPED | OUTPATIENT
Start: 2021-07-23 | End: 2022-07-20 | Stop reason: SDUPTHER

## 2021-07-24 NOTE — PROGRESS NOTES
Rola DOTSON Carlene is a 66 y.o. female and is here for a comprehensive physical exam. The patient reports no problems.    Pt is due for annual gyn exam and mammo           Social History:   Social History     Socioeconomic History   • Marital status: Single     Spouse name: Not on file   • Number of children: Not on file   • Years of education: Not on file   • Highest education level: Not on file   Tobacco Use   • Smoking status: Former Smoker     Packs/day: 2.00     Years: 26.00     Pack years: 52.00     Quit date: 1997     Years since quittin.9   • Smokeless tobacco: Never Used   • Tobacco comment: caffeine use- tea   Substance and Sexual Activity   • Alcohol use: No   • Drug use: No   • Sexual activity: Never       Family History:   Family History   Problem Relation Age of Onset   • Heart disease Mother    • Hypertension Mother    • Depression Mother    • Miscarriages / Stillbirths Mother    • Cancer Father    • Depression Sister    • Cancer Sister    • Depression Brother    • Colon polyps Brother    • Atrial fibrillation Brother    • Depression Brother    • Depression Brother    • Depression Sister    • Vision loss Maternal Grandmother    • Arthritis Maternal Grandmother    • Vision loss Paternal Grandmother    • Breast cancer Maternal Aunt    • Breast cancer Maternal Aunt    • Diabetes Paternal Grandfather        Past Medical History:   Past Medical History:   Diagnosis Date   • Abnormal weight loss    • Anxiety    • Arthritis     OSTEOARTHRITIS RIGHT KNEE AND INDEX FINGERS   • Atrial fibrillation (CMS/HCC)    • Atrial fibrillation with RVR (CMS/HCC)    • Bipolar disorder (CMS/HCC)    • Cataract 2015    developing   • Cholelithiasis     uncompllicated   • Chronic atrial fibrillation (CMS/HCC) 2017   • Depression    • GERD (gastroesophageal reflux disease)     with spicy food   • Headache     when I am stressed   • Hyperkalemia    • Hyperthyroidism 2017   • Hypokalemia    •  Jaundice    • Low back pain 2013    seems to get worse with age   • Mild mitral regurgitation    • Mild tricuspid regurgitation    • Mixed hyperlipidemia    • Morbid obesity with BMI of 40.0-44.9, adult (CMS/[facility]) 6/3/2019   • Nausea and vomiting    • BARBIE (obstructive sleep apnea)    • Osteopenia osteopenia   • Scoliosis    • Sleep apnea    • Uterine mass     muliple masses, 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   • Visual impairment cataracts developing       The following portions of the patient's history were reviewed and updated as appropriate: allergies, current medications, past family history, past medical history, past social history, past surgical history and problem list.    Review of Systems    Review of Systems   Constitutional: Negative for chills and fever.   HENT: Negative for congestion, rhinorrhea, sinus pain and sore throat.    Eyes: Negative for photophobia and visual disturbance.   Respiratory: Negative for cough, chest tightness and shortness of breath.    Cardiovascular: Negative for chest pain and palpitations.   Gastrointestinal: Negative for diarrhea, nausea and vomiting.   Genitourinary: Negative for dysuria, frequency and urgency.   Skin: Negative for rash and wound.   Neurological: Negative for dizziness and syncope.   Psychiatric/Behavioral: Negative for behavioral problems and confusion.       Objective   Physical Exam  Vitals and nursing note reviewed.   Constitutional:       Appearance: She is well-developed.   HENT:      Head: Normocephalic and atraumatic.      Right Ear: External ear normal.      Left Ear: External ear normal.   Cardiovascular:      Rate and Rhythm: Normal rate and regular rhythm.      Heart sounds: Normal heart sounds.   Pulmonary:      Effort: Pulmonary effort is normal. No respiratory distress.      Breath sounds: Normal breath sounds.   Abdominal:      Palpations: Abdomen is soft.      Tenderness: There is no abdominal tenderness. There is no guarding.   Musculoskeletal:          General: Normal range of motion.      Cervical back: Normal range of motion and neck supple.   Lymphadenopathy:      Cervical: No cervical adenopathy.   Skin:     General: Skin is warm.   Neurological:      Mental Status: She is alert and oriented to person, place, and time.   Psychiatric:         Behavior: Behavior normal.         Vitals:    07/07/21 1109   BP: 124/82   Pulse: 97   Resp: 18   SpO2: 98%     Body mass index is 46.42 kg/m².      Medications:   Current Outpatient Medications:   •  Armodafinil (NUVIGIL) 250 MG tablet, Take 250 mg by mouth Daily., Disp: , Rfl:   •  aspirin 81 MG tablet, Take 1 tablet by mouth Daily., Disp: , Rfl:   •  Calcium 500-100 MG-UNIT chewable tablet, Chew 1 tablet Every 4 (Four) Hours As Needed., Disp: , Rfl:   •  Cholecalciferol (VITAMIN D) 2000 units capsule, Take 2,000 Units by mouth Daily., Disp: , Rfl:   •  clonazePAM (KlonoPIN) 0.5 MG tablet, Take 0.5 mg by mouth 2 (Two) Times a Day As Needed for Anxiety., Disp: , Rfl:   •  Ibuprofen (ADVIL PO), Take 1 tablet by mouth As Needed., Disp: , Rfl:   •  Multiple Vitamins-Minerals (PRESERVISION AREDS 2 PO), Take 1 tablet by mouth Daily., Disp: , Rfl:   •  naproxen (NAPROSYN) 250 MG tablet, Take 500 mg by mouth Daily As Needed., Disp: , Rfl:   •  pramipexole (MIRAPEX) 0.25 MG tablet, , Disp: , Rfl:   •  topiramate (TOPAMAX) 100 MG tablet, Take 100 mg by mouth Daily., Disp: , Rfl:   •  atorvastatin (LIPITOR) 10 MG tablet, Take 1 tablet by mouth Daily., Disp: 90 tablet, Rfl: 3  •  metoprolol succinate XL (TOPROL-XL) 100 MG 24 hr tablet, Take 1 tablet by mouth Daily., Disp: 90 tablet, Rfl: 3       Assessment/Plan   Healthy female exam.      1. Healthcare Maintenance:  2. Patient Counseling:  --Nutrition: Stressed importance of moderation in sodium/caffeine intake, saturated fat and cholesterol, caloric balance, sufficient intake of fresh fruits, vegetables, fiber, calcium and vit D  --Exercise: Recommended 30 minutes of exercise  daily.  --Immunizations reviewed.  --Discussed benefits of screening colonoscopy.    Diagnoses and all orders for this visit:    Well woman exam (no gynecological exam)    Medicare annual wellness visit, subsequent    Healthcare maintenance  -     DEXA Bone Density Axial  -     CBC & Differential  -     Comprehensive Metabolic Panel  -     Hemoglobin A1c  -     Thyroid Panel With TSH  -     Lipid Panel With LDL / HDL Ratio  -     Hepatitis C Antibody    Screening for osteoporosis  -     DEXA Bone Density Axial    Screen for colon cancer  -     Ambulatory Referral For Screening Colonoscopy    Encounter for follow-up examination after completed treatment for conditions other than malignant neoplasm   -     DEXA Bone Density Axial    Mixed hyperlipidemia  -     atorvastatin (LIPITOR) 10 MG tablet; Take 1 tablet by mouth Daily.    Other orders  -     pramipexole (MIRAPEX) 0.25 MG tablet        No follow-ups on file.             Dictated utilizing Dragon Voice Recognition Software

## 2021-07-30 ENCOUNTER — HOSPITAL ENCOUNTER (OUTPATIENT)
Dept: MAMMOGRAPHY | Facility: HOSPITAL | Age: 66
Discharge: HOME | End: 2021-07-30
Admitting: FAMILY MEDICINE

## 2021-07-30 DIAGNOSIS — Z12.31 VISIT FOR SCREENING MAMMOGRAM: ICD-10-CM

## 2021-07-30 PROCEDURE — 77063 BREAST TOMOSYNTHESIS BI: CPT

## 2021-07-30 PROCEDURE — 77067 SCR MAMMO BI INCL CAD: CPT

## 2021-08-31 ENCOUNTER — TELEPHONE (OUTPATIENT)
Dept: GASTROENTEROLOGY | Facility: CLINIC | Age: 66
End: 2021-08-31

## 2021-10-01 ENCOUNTER — HOSPITAL ENCOUNTER (OUTPATIENT)
Dept: BONE DENSITY | Facility: HOSPITAL | Age: 66
Discharge: HOME | End: 2021-10-01
Admitting: FAMILY MEDICINE

## 2021-10-01 PROCEDURE — 77080 DXA BONE DENSITY AXIAL: CPT

## 2021-10-08 ENCOUNTER — OFFICE VISIT (OUTPATIENT)
Dept: INTERNAL MEDICINE | Facility: CLINIC | Age: 66
End: 2021-10-08

## 2021-10-08 ENCOUNTER — TELEPHONE (OUTPATIENT)
Dept: INTERNAL MEDICINE | Facility: CLINIC | Age: 66
End: 2021-10-08

## 2021-10-08 ENCOUNTER — LAB (OUTPATIENT)
Dept: LAB | Facility: HOSPITAL | Age: 66
End: 2021-10-08

## 2021-10-08 VITALS
TEMPERATURE: 98 F | SYSTOLIC BLOOD PRESSURE: 120 MMHG | BODY MASS INDEX: 46.12 KG/M2 | HEART RATE: 106 BPM | HEIGHT: 62 IN | RESPIRATION RATE: 16 BRPM | OXYGEN SATURATION: 97 % | DIASTOLIC BLOOD PRESSURE: 74 MMHG | WEIGHT: 250.6 LBS

## 2021-10-08 DIAGNOSIS — E78.2 MIXED HYPERLIPIDEMIA: ICD-10-CM

## 2021-10-08 DIAGNOSIS — Z23 NEED FOR IMMUNIZATION AGAINST INFLUENZA: ICD-10-CM

## 2021-10-08 DIAGNOSIS — T30.0 BURN: Primary | ICD-10-CM

## 2021-10-08 LAB
ALBUMIN SERPL-MCNC: 4.1 G/DL (ref 3.5–5.2)
ALBUMIN/GLOB SERPL: 1.7 G/DL
ALP SERPL-CCNC: 103 U/L (ref 39–117)
ALT SERPL W P-5'-P-CCNC: 18 U/L (ref 1–33)
ANION GAP SERPL CALCULATED.3IONS-SCNC: 11.2 MMOL/L (ref 5–15)
AST SERPL-CCNC: 24 U/L (ref 1–32)
BASOPHILS # BLD AUTO: 0.04 10*3/MM3 (ref 0–0.2)
BASOPHILS NFR BLD AUTO: 0.6 % (ref 0–1.5)
BILIRUB SERPL-MCNC: 0.2 MG/DL (ref 0–1.2)
BUN SERPL-MCNC: 23 MG/DL (ref 8–23)
BUN/CREAT SERPL: 32.4 (ref 7–25)
CALCIUM SPEC-SCNC: 9.2 MG/DL (ref 8.6–10.5)
CHLORIDE SERPL-SCNC: 111 MMOL/L (ref 98–107)
CHOLEST SERPL-MCNC: 136 MG/DL (ref 0–200)
CO2 SERPL-SCNC: 21.8 MMOL/L (ref 22–29)
CREAT SERPL-MCNC: 0.71 MG/DL (ref 0.57–1)
DEPRECATED RDW RBC AUTO: 39.8 FL (ref 37–54)
EOSINOPHIL # BLD AUTO: 0.22 10*3/MM3 (ref 0–0.4)
EOSINOPHIL NFR BLD AUTO: 3.2 % (ref 0.3–6.2)
ERYTHROCYTE [DISTWIDTH] IN BLOOD BY AUTOMATED COUNT: 12.9 % (ref 12.3–15.4)
GFR SERPL CREATININE-BSD FRML MDRD: 82 ML/MIN/1.73
GLOBULIN UR ELPH-MCNC: 2.4 GM/DL
GLUCOSE SERPL-MCNC: 84 MG/DL (ref 65–99)
HBA1C MFR BLD: 5.11 % (ref 4.8–5.6)
HCT VFR BLD AUTO: 40.1 % (ref 34–46.6)
HCV AB SER DONR QL: NORMAL
HDLC SERPL-MCNC: 58 MG/DL (ref 40–60)
HGB BLD-MCNC: 13.1 G/DL (ref 12–15.9)
IMM GRANULOCYTES # BLD AUTO: 0.01 10*3/MM3 (ref 0–0.05)
IMM GRANULOCYTES NFR BLD AUTO: 0.1 % (ref 0–0.5)
LDLC SERPL CALC-MCNC: 61 MG/DL (ref 0–100)
LDLC/HDLC SERPL: 1.04 {RATIO}
LYMPHOCYTES # BLD AUTO: 2.12 10*3/MM3 (ref 0.7–3.1)
LYMPHOCYTES NFR BLD AUTO: 30.4 % (ref 19.6–45.3)
MCH RBC QN AUTO: 27.5 PG (ref 26.6–33)
MCHC RBC AUTO-ENTMCNC: 32.7 G/DL (ref 31.5–35.7)
MCV RBC AUTO: 84.1 FL (ref 79–97)
MONOCYTES # BLD AUTO: 0.57 10*3/MM3 (ref 0.1–0.9)
MONOCYTES NFR BLD AUTO: 8.2 % (ref 5–12)
NEUTROPHILS NFR BLD AUTO: 4.02 10*3/MM3 (ref 1.7–7)
NEUTROPHILS NFR BLD AUTO: 57.5 % (ref 42.7–76)
NRBC BLD AUTO-RTO: 0 /100 WBC (ref 0–0.2)
PLATELET # BLD AUTO: 224 10*3/MM3 (ref 140–450)
PMV BLD AUTO: 11 FL (ref 6–12)
POTASSIUM SERPL-SCNC: 4.3 MMOL/L (ref 3.5–5.2)
PROT SERPL-MCNC: 6.5 G/DL (ref 6–8.5)
RBC # BLD AUTO: 4.77 10*6/MM3 (ref 3.77–5.28)
SODIUM SERPL-SCNC: 144 MMOL/L (ref 136–145)
T-UPTAKE NFR SERPL: 0.97 TBI (ref 0.8–1.3)
T4 SERPL-MCNC: 8.06 MCG/DL (ref 4.5–11.7)
TRIGL SERPL-MCNC: 89 MG/DL (ref 0–150)
TSH SERPL DL<=0.05 MIU/L-ACNC: 0.82 UIU/ML (ref 0.27–4.2)
VLDLC SERPL-MCNC: 17 MG/DL (ref 5–40)
WBC # BLD AUTO: 6.98 10*3/MM3 (ref 3.4–10.8)

## 2021-10-08 PROCEDURE — 86803 HEPATITIS C AB TEST: CPT | Performed by: FAMILY MEDICINE

## 2021-10-08 PROCEDURE — 83036 HEMOGLOBIN GLYCOSYLATED A1C: CPT | Performed by: FAMILY MEDICINE

## 2021-10-08 PROCEDURE — 85025 COMPLETE CBC W/AUTO DIFF WBC: CPT | Performed by: FAMILY MEDICINE

## 2021-10-08 PROCEDURE — 84436 ASSAY OF TOTAL THYROXINE: CPT | Performed by: FAMILY MEDICINE

## 2021-10-08 PROCEDURE — 80061 LIPID PANEL: CPT | Performed by: FAMILY MEDICINE

## 2021-10-08 PROCEDURE — G0008 ADMIN INFLUENZA VIRUS VAC: HCPCS | Performed by: FAMILY MEDICINE

## 2021-10-08 PROCEDURE — 80053 COMPREHEN METABOLIC PANEL: CPT | Performed by: FAMILY MEDICINE

## 2021-10-08 PROCEDURE — 99214 OFFICE O/P EST MOD 30 MIN: CPT | Performed by: FAMILY MEDICINE

## 2021-10-08 PROCEDURE — 84479 ASSAY OF THYROID (T3 OR T4): CPT | Performed by: FAMILY MEDICINE

## 2021-10-08 PROCEDURE — 90662 IIV NO PRSV INCREASED AG IM: CPT | Performed by: FAMILY MEDICINE

## 2021-10-08 PROCEDURE — 84443 ASSAY THYROID STIM HORMONE: CPT | Performed by: FAMILY MEDICINE

## 2021-10-08 PROCEDURE — 36415 COLL VENOUS BLD VENIPUNCTURE: CPT | Performed by: FAMILY MEDICINE

## 2021-10-08 NOTE — TELEPHONE ENCOUNTER
Caller: Bowen Concepcion    Relationship: Self    Best call back number: [phone]    What is the best time to reach you: ANYTIME    Who are you requesting to speak with (clinical staff, provider,  specific staff member): CLINICAL STAFF    Do you know the name of the person who called: ESTELA    What was the call regarding: BONE DENSITY TEST RESULTS    Do you require a callback: YES

## 2021-10-10 NOTE — PROGRESS NOTES
"Chief Complaint  Treatment Follow-up (Burn on right hand concerns )    Subjective          Bowen Concepcion presents to [facility]  History of Present Illness    Patient has a history of hyperlipidemia.  Patient is currently taking Lipitor 10 mg daily.  Started on his new medication, she states that she is trying to monitor her diet as well.  She denies any side effects of the medicine.    She had a burn in her hand couple days ago, and has used just some cold water.  Is currently not applying anything else to the hand.  There is currently no blisters.    Objective   Vital Signs:   /74   Pulse 106   Temp 98 °F (36.7 °C)   Resp 16   Ht 157.5 cm (62\")   Wt 114 kg (250 lb 9.6 oz)   SpO2 97%   BMI 45.84 kg/m²     Physical Exam  Vitals and nursing note reviewed.   Constitutional:       Appearance: She is well-developed.   HENT:      Head: Normocephalic and atraumatic.   Musculoskeletal:      Cervical back: Normal range of motion and neck supple.   Skin:     Comments: Right hand slightly red, no blisters seen.   Neurological:      Mental Status: She is alert and oriented to person, place, and time.   Psychiatric:         Behavior: Behavior normal.        Result Review :                 Assessment and Plan    Diagnoses and all orders for this visit:    1. Burn (Primary)  -     silver sulfadiazine (Silvadene) 1 % cream; Apply 1 application topically to the appropriate area as directed 2 (Two) Times a Day for 7 days.  Dispense: 14 g; Refill: 0  -     Start patient on Silvadene.    2. Mixed hyperlipidemia  -     Lipid Panel With LDL / HDL Ratio  -     Comprehensive Metabolic Panel  -     Continue atorvastatin 10 mg daily.    3. Need for immunization against influenza  -     Fluzone High-Dose 65+yrs ([id number])        Follow Up   No follow-ups on file.  Patient was given instructions and counseling regarding her condition or for health maintenance advice. Please see specific " information pulled into the AVS if appropriate.

## 2022-01-06 ENCOUNTER — PRE-PROCEDURE SCREENING (OUTPATIENT)
Dept: GASTROENTEROLOGY | Facility: CLINIC | Age: 67
End: 2022-01-06

## 2022-01-06 NOTE — TELEPHONE ENCOUNTER
Colonoscopy screening has been sent  for review                      Pt verbalize and understood that it would be few weeks before been scheduling

## 2022-01-12 ENCOUNTER — PREP FOR SURGERY (OUTPATIENT)
Dept: OTHER | Facility: HOSPITAL | Age: 67
End: 2022-01-12

## 2022-01-12 DIAGNOSIS — Z12.11 SCREEN FOR COLON CANCER: Primary | ICD-10-CM

## 2022-02-16 ENCOUNTER — TELEPHONE (OUTPATIENT)
Dept: GASTROENTEROLOGY | Facility: CLINIC | Age: 67
End: 2022-02-16

## 2022-02-17 PROBLEM — Z12.11 SCREEN FOR COLON CANCER: Status: ACTIVE | Noted: 2022-02-17

## 2022-02-17 NOTE — TELEPHONE ENCOUNTER
URSULA patient via telephone for colonoscopy. Scheduled 06/02/2022 with arrival time 2:30pm. Prep packet mailed to verified address on file. Also advised arrival time may change based on [facility] guidelines. URSULA Lyons--Nedra

## 2022-05-17 ENCOUNTER — TELEPHONE (OUTPATIENT)
Dept: CARDIOLOGY | Facility: CLINIC | Age: 67
End: 2022-05-17

## 2022-05-17 NOTE — TELEPHONE ENCOUNTER
I have left a message for patient to return my call to discuss why she wants to be referred to the [facility].

## 2022-05-17 NOTE — TELEPHONE ENCOUNTER
----- Message from Viri Oconnor MA sent at 5/17/2022  2:58 PM EDT -----  Regarding: FW: Referral to [facility]  Should she make an appointment to discuss a referral to see EP?    ----- Message -----  From: Bowen Concepcion  Sent: 5/17/2022   2:06 PM EDT  To: Ricadro [facility]  Subject: Referral to [facility]          In order to make an appointment with one of their specialists in atrial fibrillation, they require a referral from my cardiologist. I would appreciate your assistance in this matter.  Thanks.  Bowen Concepcion

## 2022-05-19 NOTE — TELEPHONE ENCOUNTER
"  I returned patient's call to obtain more information.  She had another physician and the friend recommended [facility] electrophys[facility] service and that they are \"on the cutting edge\".  She wants to be more proactive about her atrial fibrillation.  I explained that we do offer similar EP services here at [facility].  She really just wants to see the [place] [facility].    Dr. Murdock-she wants to know if you will give her referral to [facility] EP [facility].  She says she has to have a referral to make an appointment.  I told her also discussing with her PCP may be an option.  "

## 2022-05-26 NOTE — TELEPHONE ENCOUNTER
Yes, thanks, I do not have an easy way to refer her to [facility].  Viri, it is almost a year since we saw her, lets move up her appointment so we can go over things with her.

## 2022-06-01 ENCOUNTER — TRANSCRIBE ORDERS (OUTPATIENT)
Dept: ADMINISTRATIVE | Facility: HOSPITAL | Age: 67
End: 2022-06-01

## 2022-06-01 DIAGNOSIS — Z12.31 SCREENING MAMMOGRAM, ENCOUNTER FOR: Primary | ICD-10-CM

## 2022-06-02 ENCOUNTER — ANESTHESIA EVENT (OUTPATIENT)
Dept: GASTROENTEROLOGY | Facility: HOSPITAL | Age: 67
End: 2022-06-02

## 2022-06-02 ENCOUNTER — HOSPITAL ENCOUNTER (OUTPATIENT)
Facility: HOSPITAL | Age: 67
Setting detail: HOSPITAL OUTPATIENT SURGERY
Discharge: HOME | End: 2022-06-02
Attending: INTERNAL MEDICINE | Admitting: INTERNAL MEDICINE

## 2022-06-02 ENCOUNTER — ANESTHESIA (OUTPATIENT)
Dept: GASTROENTEROLOGY | Facility: HOSPITAL | Age: 67
End: 2022-06-02

## 2022-06-02 VITALS
SYSTOLIC BLOOD PRESSURE: 125 MMHG | RESPIRATION RATE: 16 BRPM | DIASTOLIC BLOOD PRESSURE: 60 MMHG | HEIGHT: 62 IN | WEIGHT: 229 LBS | OXYGEN SATURATION: 98 % | HEART RATE: 96 BPM | BODY MASS INDEX: 42.14 KG/M2

## 2022-06-02 DIAGNOSIS — Z12.11 SCREEN FOR COLON CANCER: ICD-10-CM

## 2022-06-02 PROCEDURE — 25010000002 PROPOFOL 10 MG/ML EMULSION: Performed by: ANESTHESIOLOGY

## 2022-06-02 PROCEDURE — 45380 COLONOSCOPY AND BIOPSY: CPT | Performed by: INTERNAL MEDICINE

## 2022-06-02 PROCEDURE — 88305 TISSUE EXAM BY PATHOLOGIST: CPT | Performed by: INTERNAL MEDICINE

## 2022-06-02 RX ORDER — SODIUM CHLORIDE 0.9 % (FLUSH) 0.9 %
10 SYRINGE (ML) INJECTION EVERY 12 HOURS SCHEDULED
Status: DISCONTINUED | OUTPATIENT
Start: 2022-06-02 | End: 2022-06-02 | Stop reason: HOSPADM

## 2022-06-02 RX ORDER — SODIUM CHLORIDE 0.9 % (FLUSH) 0.9 %
10 SYRINGE (ML) INJECTION AS NEEDED
Status: DISCONTINUED | OUTPATIENT
Start: 2022-06-02 | End: 2022-06-02 | Stop reason: HOSPADM

## 2022-06-02 RX ORDER — SODIUM CHLORIDE, SODIUM LACTATE, POTASSIUM CHLORIDE, CALCIUM CHLORIDE 600; 310; 30; 20 MG/100ML; MG/100ML; MG/100ML; MG/100ML
30 INJECTION, SOLUTION INTRAVENOUS CONTINUOUS PRN
Status: DISCONTINUED | OUTPATIENT
Start: 2022-06-02 | End: 2022-06-02 | Stop reason: HOSPADM

## 2022-06-02 RX ORDER — LIDOCAINE HYDROCHLORIDE 20 MG/ML
INJECTION, SOLUTION INFILTRATION; PERINEURAL AS NEEDED
Status: DISCONTINUED | OUTPATIENT
Start: 2022-06-02 | End: 2022-06-02 | Stop reason: SURG

## 2022-06-02 RX ORDER — PROPOFOL 10 MG/ML
VIAL (ML) INTRAVENOUS AS NEEDED
Status: DISCONTINUED | OUTPATIENT
Start: 2022-06-02 | End: 2022-06-02 | Stop reason: SURG

## 2022-06-02 RX ADMIN — PROPOFOL 160 MCG/KG/MIN: 10 INJECTION, EMULSION INTRAVENOUS at 15:38

## 2022-06-02 RX ADMIN — SODIUM CHLORIDE, POTASSIUM CHLORIDE, SODIUM LACTATE AND CALCIUM CHLORIDE 30 ML/HR: 600; 310; 30; 20 INJECTION, SOLUTION INTRAVENOUS at 14:51

## 2022-06-02 RX ADMIN — LIDOCAINE HYDROCHLORIDE 60 MG: 20 INJECTION, SOLUTION INFILTRATION; PERINEURAL at 15:38

## 2022-06-02 RX ADMIN — PROPOFOL 150 MG: 10 INJECTION, EMULSION INTRAVENOUS at 15:38

## 2022-06-02 NOTE — ANESTHESIA PREPROCEDURE EVALUATION
Anesthesia Evaluation                  Airway   Mallampati: IV  Neck ROM: full  Difficult intubation highly probable  Dental - normal exam     Pulmonary - normal exam   (+) shortness of breath, sleep apnea on CPAP,   Cardiovascular - normal exam    Beta blocker not taken-may be given intraoperatively    (+) valvular problems/murmurs, dysrhythmias Atrial Fib, hyperlipidemia,       Neuro/Psych  (+) headaches, psychiatric history Bipolar,    GI/Hepatic/Renal/Endo    (+) obesity, morbid obesity, GERD,  liver disease, thyroid problem hypothyroidism    Musculoskeletal     Abdominal    Substance History      OB/GYN          Other                      Anesthesia Plan    ASA 3     MAC     intravenous induction     Anesthetic plan, all risks, benefits, and alternatives have been provided, discussed and informed consent has been obtained with: patient.        CODE STATUS:

## 2022-06-02 NOTE — ANESTHESIA POSTPROCEDURE EVALUATION
"Patient: Bowen Concepcion    Procedure Summary     Date: 06/02/22 Room / Location:  [facility]    Anesthesia Start: 1530 Anesthesia Stop: 1616    Procedure: COLONOSCOPY to terminal ileum with polypectomy (cold bx) (N/A ) Diagnosis:       Screen for colon cancer      (Screen for colon cancer [Z12.11])    Surgeons: Inder Brand MD Provider: Garry Grnat MD    Anesthesia Type: MAC ASA Status: 3          Anesthesia Type: MAC    Vitals  Vitals Value Taken Time   /75 06/02/22 1614   Temp     Pulse 101 06/02/22 1614   Resp 16 06/02/22 1614   SpO2 100 % 06/02/22 1614           Post Anesthesia Care and Evaluation    Pain management: adequate  Airway patency: patent  Anesthetic complications: No anesthetic complications    Cardiovascular status: acceptable  Respiratory status: acceptable  Hydration status: acceptable    Comments: /75 (BP Location: Left arm, Patient Position: Lying)   Pulse 101   Resp 16   Ht 157.5 cm (62\")   Wt 104 kg (229 lb)   SpO2 100%   BMI 41.88 kg/m²         "

## 2022-06-02 NOTE — H&P
[facility]  Pre Procedure History & Physical    Chief Complaint:   Time for my colonoscopy    Subjective     HPI:   66 y.o. female whose brother has colon polyps. Her last colonoscopy was in 2006.     Past Medical History:   Past Medical History:   Diagnosis Date   • Abnormal weight loss    • Anxiety    • Arthritis     OSTEOARTHRITIS RIGHT KNEE AND INDEX FINGERS   • Atrial fibrillation (HCC)    • Atrial fibrillation with RVR ([facility])    • Bipolar disorder (HCC)    • Cataract 2015    developing   • Cholelithiasis     uncompllicated   • Chronic atrial fibrillation ([facility]) 12/9/2017   • Depression    • GERD (gastroesophageal reflux disease)     with spicy food   • Headache     when I am stressed   • Hyperkalemia    • Hyperthyroidism 12/12/2017   • Hypokalemia    • Jaundice    • Low back pain 2013    seems to get worse with age   • Mild mitral regurgitation    • Mild tricuspid regurgitation    • Mixed hyperlipidemia    • Morbid obesity with BMI of 40.0-44.9, adult ([facility]) 6/3/2019   • Nausea and vomiting    • BARBIE (obstructive sleep apnea)    • Osteopenia osteopenia   • Scoliosis    • Sleep apnea    • Uterine mass     muliple masses, 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   • Visual impairment cataracts developing       Family History:  Family History   Problem Relation Age of Onset   • Heart disease Mother    • Hypertension Mother    • Depression Mother    • Miscarriages / Stillbirths Mother    • Cancer Father    • Depression Sister    • Cancer Sister    • Depression Brother    • Colon polyps Brother    • Atrial fibrillation Brother    • Depression Brother    • Depression Brother    • Depression Sister    • Vision loss Maternal Grandmother    • Arthritis Maternal Grandmother    • Vision loss Paternal Grandmother    • Breast cancer Maternal Aunt    • Breast cancer Maternal Aunt    • Diabetes Paternal Grandfather        Social History:   reports that she quit smoking about 24 years ago. She has a 52.00 pack-year smoking  "history. She has never used smokeless tobacco. She reports that she does not drink alcohol and does not use drugs.    Medications:   Medications Prior to Admission   Medication Sig Dispense Refill Last Dose   • Armodafinil 250 MG tablet Take 250 mg by mouth Daily.   6/1/2022 at Unknown time   • aspirin 81 MG tablet Take 1 tablet by mouth Daily.   6/1/2022 at Unknown time   • atorvastatin (LIPITOR) 10 MG tablet Take 1 tablet by mouth Daily. 90 tablet 3 Past Week at Unknown time   • Calcium 500-100 MG-UNIT chewable tablet Chew 1 tablet Every 4 (Four) Hours As Needed.   Past Week at Unknown time   • Cholecalciferol (VITAMIN D) 2000 units capsule Take 2,000 Units by mouth Daily.   Past Week at Unknown time   • clonazePAM (KlonoPIN) 0.5 MG tablet Take 0.5 mg by mouth 2 (Two) Times a Day As Needed for Anxiety.   Past Week at Unknown time   • metoprolol succinate XL (TOPROL-XL) 100 MG 24 hr tablet Take 1 tablet by mouth Daily. 90 tablet 3 6/1/2022 at Unknown time   • Multiple Vitamins-Minerals (PRESERVISION AREDS 2 PO) Take 1 tablet by mouth Daily.   Past Week at Unknown time   • pramipexole (MIRAPEX) 0.25 MG tablet    6/1/2022 at Unknown time   • topiramate (TOPAMAX) 100 MG tablet Take 100 mg by mouth Daily.   6/1/2022 at Unknown time   • Ibuprofen (ADVIL PO) Take 1 tablet by mouth As Needed.   More than a month at Unknown time   • naproxen (NAPROSYN) 250 MG tablet Take 500 mg by mouth Daily As Needed.   More than a month at Unknown time       Allergies:  Desyrel [trazodone] and Modafinil    ROS:    Pertinent items are noted in HPI     Objective     Blood pressure (!) 127/102, pulse 102, resp. rate 16, height 157.5 cm (62\"), weight 104 kg (229 lb), SpO2 98 %, not currently breastfeeding.    Physical Exam   Constitutional: Pt is oriented to person, place, and time and well-developed, well-nourished, and in no distress.   HENT:   Mouth/Throat: Oropharynx is clear and moist.   Neck: Normal range of motion. Neck supple. "   Cardiovascular: Normal rate, regular rhythm and normal heart sounds.    Pulmonary/Chest: Effort normal and breath sounds normal. No respiratory distress. No  wheezes.   Abdominal: Soft. Bowel sounds are normal.   Skin: Skin is warm and dry.   Psychiatric: Mood, memory, affect and judgment normal.     Assessment & Plan     Diagnosis:  66 y.o. female whose brother has colon polyps. Her last colonoscopy was in 2006.     Anticipated Surgical Procedure:  Colonoscopy    The risks, benefits, and alternatives of this procedure have been discussed with the patient or the responsible party- the patient understands and agrees to proceed.    Inder Brand M.D.

## 2022-06-06 ENCOUNTER — OFFICE VISIT (OUTPATIENT)
Dept: CARDIOLOGY | Facility: CLINIC | Age: 67
End: 2022-06-06

## 2022-06-06 VITALS
BODY MASS INDEX: 41.41 KG/M2 | HEIGHT: 62 IN | SYSTOLIC BLOOD PRESSURE: 130 MMHG | HEART RATE: 98 BPM | WEIGHT: 225 LBS | DIASTOLIC BLOOD PRESSURE: 78 MMHG

## 2022-06-06 DIAGNOSIS — I48.20 CHRONIC ATRIAL FIBRILLATION: Primary | ICD-10-CM

## 2022-06-06 DIAGNOSIS — E78.2 MIXED HYPERLIPIDEMIA: Chronic | ICD-10-CM

## 2022-06-06 LAB
LAB AP CASE REPORT: NORMAL
PATH REPORT.FINAL DX SPEC: NORMAL
PATH REPORT.GROSS SPEC: NORMAL

## 2022-06-06 PROCEDURE — 99214 OFFICE O/P EST MOD 30 MIN: CPT | Performed by: INTERNAL MEDICINE

## 2022-06-06 PROCEDURE — 93000 ELECTROCARDIOGRAM COMPLETE: CPT | Performed by: INTERNAL MEDICINE

## 2022-06-06 NOTE — PROGRESS NOTES
Subjective:     Encounter Date: 7/19/2021      Patient ID: Bowen Concepcion is a 66 y.o. female.    Chief Complaint: PAF  Atrial Fibrillation  Past medical history includes atrial fibrillation.       Dear Dr. Pitts,    She comes in for follow-up of her chronic atrial fibrillation.  She initially presented with hyperthyroidism, acute.  She was also found to have atrial fibrillation.  She also at that time was hospitalized for cholestatic hepatitis with her total bilirubin over 17.  She was not felt to be a candidate for anticoagulation nor antiarrhythmic therapy at the time, and rate control was utilized.  She had a CHADS Vascular score of 1, and has been maintained on aspirin since her liver function recovered-she decided to remain on aspirin after 64.    She comes in today for follow-up.  Specifically, a friend had recommended that she be seen by electrophysiology at [facility], but when she called they would not see her unless a cardiologist referred her.    She does not notice any palpitations or tachycardia except rarely she will have some palpitations.  She was noted to have some tachycardia during her colonoscopy.  She denies any shortness of breath.  No lower extreme edema.  No chest pain or chest discomfort, no unusual fatigue.    No further issues from her liver standpoint.  No bleeding complications.      Past Medical History:   Diagnosis Date   • Abnormal ECG 2018   • Abnormal weight loss    • Anxiety    • Arthritis     OSTEOARTHRITIS RIGHT KNEE AND INDEX FINGERS   • Asthma [id number]    Shortness of breath if weight too high   • Atrial fibrillation (HCC)    • Atrial fibrillation with RVR (HCC)    • Bipolar disorder (HCC)    • Cataract 2015    developing   • Cholelithiasis     uncompllicated   • Chronic atrial fibrillation (HCC) 12/09/2017   • Depression    • GERD (gastroesophageal reflux disease)     with spicy food   • Headache     when I am stressed   • Hyperkalemia    • Hyperthyroidism 12/12/2017  "  • Hypokalemia    • Jaundice    • Low back pain 2013    seems to get worse with age   • Mild mitral regurgitation    • Mild tricuspid regurgitation    • Mixed hyperlipidemia    • Morbid obesity with BMI of 40.0-44.9, adult ([facility]) 2019   • Nausea and vomiting    • BARBIE (obstructive sleep apnea)    • Osteopenia osteopenia   • Scoliosis    • Sleep apnea    • Uterine mass     muliple masses, 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   • Visual impairment cataracts developing       Past Surgical History:   Procedure Laterality Date   • COLONOSCOPY     • COLONOSCOPY N/A 2022    Procedure: COLONOSCOPY to terminal ileum with polypectomy (cold bx);  Surgeon: Inder Brand MD;  Location: [facility];  Service: Gastroenterology;  Laterality: N/A;  pre - family hx colon polyps  post - polyps, hemorrhoids   • UPPER GASTROINTESTINAL ENDOSCOPY         Social History     Socioeconomic History   • Marital status: Single   Tobacco Use   • Smoking status: Former Smoker     Packs/day: 2.00     Years: 26.00     Pack years: 52.00     Quit date: 1997     Years since quittin.7   • Smokeless tobacco: Never Used   • Tobacco comment: caffeine use- tea   Vaping Use   • Vaping Use: Never used   Substance and Sexual Activity   • Alcohol use: No   • Drug use: No   • Sexual activity: Not Currently     Partners: Male     Birth control/protection: Post-menopausal           ECG 12 Lead    Date/Time: 2022 12:10 PM  Performed by: Victoriano Murdock III, MD  Authorized by: Victoriano Murdock III, MD   Comparison: compared with previous ECG   Similar to previous ECG  Rhythm: atrial fibrillation  Rate: normal  Conduction: conduction normal  ST Segments: ST segments normal  T Waves: T waves normal  QRS axis: normal  Other: no other findings    Clinical impression: abnormal EKG               Objective:     Vitals:    22 1027   BP: 130/78   Pulse: 98   Weight: 102 kg (225 lb)   Height: 157.5 cm (62\")   Body mass index is 41.15 kg/m².'      "   General Appearance:    Alert, cooperative, in no acute distress   Head:    Normocephalic, without obvious abnormality, atraumatic   Eyes:            Lids and lashes normal, conjunctivae and sclerae normal, no   icterus, no pallor, corneas clear, PERRLA   Ears:    Ears appear intact with no abnormalities noted   Throat:   No oral lesions, no thrush, oral mucosa moist   Neck:   No adenopathy, supple, trachea midline, no thyromegaly, no   carotid bruit, no JVD   Back:     No kyphosis present, no scoliosis present, no skin lesions, erythema or scars, no tenderness to percussion or palpation, range of motion normal   Lungs:     Clear to auscultation,respirations regular, even and unlabored    Heart:    irregular rhythm and normal rate, normal S1 and S2, no murmur, no gallop, no rub, no click   Chest Wall:    No abnormalities observed   Abdomen:     Normal bowel sounds, no masses, no organomegaly, soft        non-tender, non-distended, no guarding, no rebound  tenderness   Extremities:   Moves all extremities well, no edema, no cyanosis, no redness   Pulses:   Pulses palpable and equal bilaterally. Normal radial, carotid, femoral, dorsalis pedis and posterior tibial pulses bilaterally. Normal abdominal aorta   Skin:  Psychiatric:   No bleeding, bruising or rash    Alert and oriented x 3, normal mood and affect     CHADS-VASc Risk Assessment            2 Total Score    1 Age 65-74    1 Sex: Female        Criteria that do not apply:    CHF    Hypertension    Age >/= 75    DM    PRIOR STROKE/TIA/THROMBO    Vascular Disease            Lab Review:             Lab Results   Component Value Date    GLUCOSE 84 10/08/2021    BUN 23 10/08/2021    CREATININE 0.71 10/08/2021    EGFRIFNONA 82 10/08/2021    EGFRIFAFRI 91 02/28/2019    BCR 32.4 (H) 10/08/2021    K 4.3 10/08/2021    CO2 21.8 (L) 10/08/2021    CALCIUM 9.2 10/08/2021    PROTENTOTREF 6.7 02/28/2019    ALBUMIN 4.10 10/08/2021    LABIL2 1.6 02/28/2019    AST 24 10/08/2021     ALT 18 10/08/2021             Assessment:          Diagnosis Plan   1. Chronic atrial fibrillation (HCC)  apixaban (ELIQUIS) 5 MG tablet tablet    ECG 12 Lead          Plan:       1. Atrial Fibrillation and Atrial Flutter  Assessment  • The patient has permanent atrial fibrillation  • This is non-valvular in etiology  • The patient's CHADS2-VASc score is 2  • A [id number] score of 2 or more is considered a high risk for a thromboembolic event  • Aspirin prescribed    Plan  • Continue in atrial fibrillation with rate control  • Add apixaban for antithrombotic therapy  • Continue beta blocker for rhythm control  • Lengthy discussion, patient will consider whether she wants a referral to see EP here.  She very much would like to see an electrophysiologist at this point.  We had a lengthy discussion she understands that since she has been in persistent A. fib since 2017 and at that time she had severe left atrial enlargement that likelihood of success with any other therapy would be remote.  Additionally, after lengthy discussion she is willing to consider anticoagulation.  She understands left to stop taking her Naprosyn for that.  I sent a prescription for apixaban.  We will also have her stop her aspirin that she had been taking.    2.  Mixed hyperlipidemia-continue lipid-lowering therapy with atorvastatin, AST ALT reviewed  3.  Cholestatic hepatitis, resolved  4.  Morbid obesity-conts to work on weight loss.    Thank you very much for allowing us to participate in the care of this pleasant patient.  Please don't hesitate to call if I can be of assistance in any way.      Current Outpatient Medications:   •  Armodafinil 250 MG tablet, Take 250 mg by mouth Daily., Disp: , Rfl:   •  atorvastatin (LIPITOR) 10 MG tablet, Take 1 tablet by mouth Daily., Disp: 90 tablet, Rfl: 3  •  Calcium 500-100 MG-UNIT chewable tablet, Chew 1 tablet Every 4 (Four) Hours As Needed., Disp: , Rfl:   •  Cholecalciferol (VITAMIN D) 2000  units capsule, Take 2,000 Units by mouth Daily., Disp: , Rfl:   •  clonazePAM (KlonoPIN) 0.5 MG tablet, Take 0.5 mg by mouth 2 (Two) Times a Day As Needed for Anxiety., Disp: , Rfl:   •  metoprolol succinate XL (TOPROL-XL) 100 MG 24 hr tablet, Take 1 tablet by mouth Daily., Disp: 90 tablet, Rfl: 3  •  Multiple Vitamins-Minerals (PRESERVISION AREDS 2 PO), Take 1 tablet by mouth Daily., Disp: , Rfl:   •  pramipexole (MIRAPEX) 0.25 MG tablet, , Disp: , Rfl:   •  topiramate (TOPAMAX) 100 MG tablet, Take 100 mg by mouth Daily., Disp: , Rfl:   •  apixaban (ELIQUIS) 5 MG tablet tablet, Take 1 tablet by mouth 2 (Two) Times a Day., Disp: 180 tablet, Rfl: 3       No follow-ups on file.

## 2022-06-25 NOTE — PROGRESS NOTES
06/25/22       Tell her that the colon polyps that were removed were not cancerous and not precancerous, which is good.  I recommend that she have a repeat colonoscopy in 5 years.  Please send a copy of this report to her PCP.  Blair beauchamp

## 2022-06-29 ENCOUNTER — TELEPHONE (OUTPATIENT)
Dept: GASTROENTEROLOGY | Facility: CLINIC | Age: 67
End: 2022-06-29

## 2022-06-29 NOTE — TELEPHONE ENCOUNTER
----- Message from Inder Brand MD sent at 6/25/2022 11:34 AM EDT -----  06/25/22       Tell her that the colon polyps that were removed were not cancerous and not precancerous, which is good.  I recommend that she have a repeat colonoscopy in 5 years.  Please send a copy of this report to her PCP.  Thx. kjh

## 2022-07-14 ENCOUNTER — TELEPHONE (OUTPATIENT)
Dept: GASTROENTEROLOGY | Facility: CLINIC | Age: 67
End: 2022-07-14

## 2022-07-14 NOTE — TELEPHONE ENCOUNTER
Caller: Bowen Concepcion    Relationship: Self    Best call back number: [phone]    What is the best time to reach you: ANYTIME    Who are you requesting to speak with (clinical staff, provider,  specific staff member): CLINICAL STAFF    Do you know the name of the person who called: JOSE BRAXTON OR SERAFIN    Do you require a callback: YES

## 2022-07-20 ENCOUNTER — LAB (OUTPATIENT)
Dept: LAB | Facility: HOSPITAL | Age: 67
End: 2022-07-20

## 2022-07-20 ENCOUNTER — OFFICE VISIT (OUTPATIENT)
Dept: INTERNAL MEDICINE | Facility: CLINIC | Age: 67
End: 2022-07-20

## 2022-07-20 VITALS
HEART RATE: 88 BPM | HEIGHT: 62 IN | OXYGEN SATURATION: 97 % | SYSTOLIC BLOOD PRESSURE: 110 MMHG | DIASTOLIC BLOOD PRESSURE: 70 MMHG | WEIGHT: 237.7 LBS | BODY MASS INDEX: 43.74 KG/M2

## 2022-07-20 DIAGNOSIS — Z00.00 MEDICARE ANNUAL WELLNESS VISIT, SUBSEQUENT: ICD-10-CM

## 2022-07-20 DIAGNOSIS — I48.20 CHRONIC ATRIAL FIBRILLATION: Chronic | ICD-10-CM

## 2022-07-20 DIAGNOSIS — Z00.00 HEALTHCARE MAINTENANCE: ICD-10-CM

## 2022-07-20 DIAGNOSIS — Z00.00 WELL WOMAN EXAM (NO GYNECOLOGICAL EXAM): Primary | ICD-10-CM

## 2022-07-20 DIAGNOSIS — E78.2 MIXED HYPERLIPIDEMIA: ICD-10-CM

## 2022-07-20 LAB
ALBUMIN SERPL-MCNC: 4 G/DL (ref 3.5–5.2)
ALBUMIN/GLOB SERPL: 1.6 G/DL
ALP SERPL-CCNC: 110 U/L (ref 39–117)
ALT SERPL W P-5'-P-CCNC: 15 U/L (ref 1–33)
ANION GAP SERPL CALCULATED.3IONS-SCNC: 11.4 MMOL/L (ref 5–15)
AST SERPL-CCNC: 22 U/L (ref 1–32)
BASOPHILS # BLD AUTO: 0.05 10*3/MM3 (ref 0–0.2)
BASOPHILS NFR BLD AUTO: 0.7 % (ref 0–1.5)
BILIRUB SERPL-MCNC: 0.4 MG/DL (ref 0–1.2)
BUN SERPL-MCNC: 19 MG/DL (ref 8–23)
BUN/CREAT SERPL: 27.5 (ref 7–25)
CALCIUM SPEC-SCNC: 9.3 MG/DL (ref 8.6–10.5)
CHLORIDE SERPL-SCNC: 107 MMOL/L (ref 98–107)
CHOLEST SERPL-MCNC: 163 MG/DL (ref 0–200)
CO2 SERPL-SCNC: 26.6 MMOL/L (ref 22–29)
CREAT SERPL-MCNC: 0.69 MG/DL (ref 0.57–1)
DEPRECATED RDW RBC AUTO: 38.7 FL (ref 37–54)
EGFRCR SERPLBLD CKD-EPI 2021: 94.7 ML/MIN/1.73
EOSINOPHIL # BLD AUTO: 0.26 10*3/MM3 (ref 0–0.4)
EOSINOPHIL NFR BLD AUTO: 3.8 % (ref 0.3–6.2)
ERYTHROCYTE [DISTWIDTH] IN BLOOD BY AUTOMATED COUNT: 13.1 % (ref 12.3–15.4)
GLOBULIN UR ELPH-MCNC: 2.5 GM/DL
GLUCOSE SERPL-MCNC: 107 MG/DL (ref 65–99)
HBA1C MFR BLD: 5.4 % (ref 4.8–5.6)
HCT VFR BLD AUTO: 40.1 % (ref 34–46.6)
HDLC SERPL-MCNC: 59 MG/DL (ref 40–60)
HGB BLD-MCNC: 13.3 G/DL (ref 12–15.9)
IMM GRANULOCYTES # BLD AUTO: 0.03 10*3/MM3 (ref 0–0.05)
IMM GRANULOCYTES NFR BLD AUTO: 0.4 % (ref 0–0.5)
LDLC SERPL CALC-MCNC: 92 MG/DL (ref 0–100)
LDLC/HDLC SERPL: 1.56 {RATIO}
LYMPHOCYTES # BLD AUTO: 1.99 10*3/MM3 (ref 0.7–3.1)
LYMPHOCYTES NFR BLD AUTO: 28.9 % (ref 19.6–45.3)
MCH RBC QN AUTO: 27.5 PG (ref 26.6–33)
MCHC RBC AUTO-ENTMCNC: 33.2 G/DL (ref 31.5–35.7)
MCV RBC AUTO: 82.9 FL (ref 79–97)
MONOCYTES # BLD AUTO: 0.51 10*3/MM3 (ref 0.1–0.9)
MONOCYTES NFR BLD AUTO: 7.4 % (ref 5–12)
NEUTROPHILS NFR BLD AUTO: 4.04 10*3/MM3 (ref 1.7–7)
NEUTROPHILS NFR BLD AUTO: 58.8 % (ref 42.7–76)
NRBC BLD AUTO-RTO: 0.1 /100 WBC (ref 0–0.2)
PLATELET # BLD AUTO: 195 10*3/MM3 (ref 140–450)
PMV BLD AUTO: 10.8 FL (ref 6–12)
POTASSIUM SERPL-SCNC: 4.2 MMOL/L (ref 3.5–5.2)
PROT SERPL-MCNC: 6.5 G/DL (ref 6–8.5)
RBC # BLD AUTO: 4.84 10*6/MM3 (ref 3.77–5.28)
SODIUM SERPL-SCNC: 145 MMOL/L (ref 136–145)
T-UPTAKE NFR SERPL: 0.92 TBI (ref 0.8–1.3)
T4 SERPL-MCNC: 8.11 MCG/DL (ref 4.5–11.7)
TRIGL SERPL-MCNC: 61 MG/DL (ref 0–150)
TSH SERPL DL<=0.05 MIU/L-ACNC: 0.97 UIU/ML (ref 0.27–4.2)
VLDLC SERPL-MCNC: 12 MG/DL (ref 5–40)
WBC NRBC COR # BLD: 6.88 10*3/MM3 (ref 3.4–10.8)

## 2022-07-20 PROCEDURE — 84443 ASSAY THYROID STIM HORMONE: CPT | Performed by: FAMILY MEDICINE

## 2022-07-20 PROCEDURE — 83036 HEMOGLOBIN GLYCOSYLATED A1C: CPT | Performed by: FAMILY MEDICINE

## 2022-07-20 PROCEDURE — 1170F FXNL STATUS ASSESSED: CPT | Performed by: FAMILY MEDICINE

## 2022-07-20 PROCEDURE — 1159F MED LIST DOCD IN RCRD: CPT | Performed by: FAMILY MEDICINE

## 2022-07-20 PROCEDURE — 1125F AMNT PAIN NOTED PAIN PRSNT: CPT | Performed by: FAMILY MEDICINE

## 2022-07-20 PROCEDURE — 96160 PT-FOCUSED HLTH RISK ASSMT: CPT | Performed by: FAMILY MEDICINE

## 2022-07-20 PROCEDURE — 80053 COMPREHEN METABOLIC PANEL: CPT | Performed by: FAMILY MEDICINE

## 2022-07-20 PROCEDURE — G0439 PPPS, SUBSEQ VISIT: HCPCS | Performed by: FAMILY MEDICINE

## 2022-07-20 PROCEDURE — 84479 ASSAY OF THYROID (T3 OR T4): CPT | Performed by: FAMILY MEDICINE

## 2022-07-20 PROCEDURE — 85025 COMPLETE CBC W/AUTO DIFF WBC: CPT | Performed by: FAMILY MEDICINE

## 2022-07-20 PROCEDURE — 80061 LIPID PANEL: CPT | Performed by: FAMILY MEDICINE

## 2022-07-20 PROCEDURE — 36415 COLL VENOUS BLD VENIPUNCTURE: CPT | Performed by: FAMILY MEDICINE

## 2022-07-20 PROCEDURE — 84436 ASSAY OF TOTAL THYROXINE: CPT | Performed by: FAMILY MEDICINE

## 2022-07-20 RX ORDER — ATORVASTATIN CALCIUM 10 MG/1
10 TABLET, FILM COATED ORAL DAILY
Qty: 90 TABLET | Refills: 3 | Status: SHIPPED | OUTPATIENT
Start: 2022-07-20 | End: 2023-02-06 | Stop reason: SDDI

## 2022-07-20 RX ORDER — METOPROLOL SUCCINATE 100 MG/1
100 TABLET, EXTENDED RELEASE ORAL DAILY
Qty: 90 TABLET | Refills: 0 | Status: SHIPPED | OUTPATIENT
Start: 2022-07-20 | End: 2022-11-02 | Stop reason: SDUPTHER

## 2022-07-20 NOTE — PROGRESS NOTES
The ABCs of the Annual Wellness Visit  Subsequent Medicare Wellness Visit    Chief Complaint   Patient presents with   • follow up to hyperlipidemia   • Medicare Wellness-subsequent      Subjective    History of Present Illness:  Bowen Concepcion is a 67 y.o. female who presents for a Subsequent Medicare Wellness Visit.    The following portions of the patient's history were reviewed and   updated as appropriate: allergies, current medications, past family history, past medical history, past social history, past surgical history and problem list.    Compared to one year ago, the patient feels her physical   health is worse.    Compared to one year ago, the patient feels her mental   health is better.    Recent Hospitalizations:  She was not admitted to the hospital during the last year.       Current Medical Providers:  Patient Care Team:  Aubrey Pitts MD as PCP - General (Family Medicine)  Bright Rodriguez MD as Consulting Physician (Psychiatry)    Outpatient Medications Prior to Visit   Medication Sig Dispense Refill   • apixaban (ELIQUIS) 5 MG tablet tablet Take 1 tablet by mouth 2 (Two) Times a Day. 180 tablet 3   • Armodafinil 250 MG tablet Take 250 mg by mouth Daily.     • Calcium 500-100 MG-UNIT chewable tablet Chew 1 tablet Every 4 (Four) Hours As Needed.     • Cholecalciferol (VITAMIN D) 2000 units capsule Take 2,000 Units by mouth Daily.     • clonazePAM (KlonoPIN) 0.5 MG tablet Take 0.5 mg by mouth 2 (Two) Times a Day As Needed for Anxiety.     • metoprolol succinate XL (TOPROL-XL) 100 MG 24 hr tablet Take 1 tablet by mouth Daily. 90 tablet 0   • Multiple Vitamins-Minerals (PRESERVISION AREDS 2 PO) Take 1 tablet by mouth Daily.     • pramipexole (MIRAPEX) 0.25 MG tablet      • topiramate (TOPAMAX) 100 MG tablet Take 100 mg by mouth Daily.     • atorvastatin (LIPITOR) 10 MG tablet Take 1 tablet by mouth Daily. 90 tablet 3     No facility-administered medications prior to visit.       No opioid  "medication identified on active medication list. I have reviewed chart for other potential  high risk medication/s and harmful drug interactions in the elderly.          Aspirin is not on active medication list.  Aspirin use is contraindicated for this patient due to: current use of Eliquis.  .    Patient Active Problem List   Diagnosis   • Bipolar I disorder, single manic episode (HCC)   • Acid reflux   • HLD (hyperlipidemia)   • LBP (low back pain)   • Chronic atrial fibrillation (HCC)   • BARBIE (obstructive sleep apnea)   • Nausea & vomiting   • Hypokalemia   • Abnormal weight loss   • Hyperthyroidism   • Visual changes   • Nontoxic multinodular goiter   • Jaundice of recent onset   • Anorexia   • Hyperbilirubinemia   • Drug induced liver disease   • Abnormal weight gain   • Vitamin D deficiency   • Shortness of breath   • Thyroiditis   • H/O hyperthyroidism   • Morbid obesity with BMI of 40.0-44.9, adult (HCC)   • Screen for colon cancer     Advance Care Planning  Advance Directive is not on file.  ACP discussion was held with the patient during this visit. Patient does not have an advance directive, declines further assistance.          Objective    Vitals:    07/20/22 1402   BP: 110/70   BP Location: Left arm   Patient Position: Sitting   Cuff Size: Large Adult   Pulse: 88   SpO2: 97%   Weight: 108 kg (237 lb 11.2 oz)   Height: 157.5 cm (62\")   PainSc:   4     Estimated body mass index is 43.48 kg/m² as calculated from the following:    Height as of this encounter: 157.5 cm (62\").    Weight as of this encounter: 108 kg (237 lb 11.2 oz).    Class 3 Severe Obesity (BMI >=40). Obesity-related health conditions include the following: hypertension and dyslipidemias. Obesity is unchanged. BMI is is above average; BMI management plan is completed. We discussed portion control and increasing exercise.      Does the patient have evidence of cognitive impairment? No    Physical Exam  Lab Results   Component Value Date    " TRIG 61 2022    HDL 59 2022    LDL 92 2022    VLDL 12 2022    HGBA1C 5.40 2022            HEALTH RISK ASSESSMENT    Smoking Status:  Social History     Tobacco Use   Smoking Status Former Smoker   • Packs/day: 2.00   • Years: 26.00   • Pack years: 52.00   • Quit date: 1997   • Years since quittin.9   Smokeless Tobacco Never Used   Tobacco Comment    caffeine use- tea     Alcohol Consumption:  Social History     Substance and Sexual Activity   Alcohol Use No     Fall Risk Screen:    STEADI Fall Risk Assessment was completed, and patient is at LOW risk for falls.Assessment completed on:2022    Depression Screening:  PHQ-2/PHQ-9 Depression Screening 2022   Retired PHQ-9 Total Score -   Retired Total Score -   Little Interest or Pleasure in Doing Things 0-->not at all   Feeling Down, Depressed or Hopeless 0-->not at all   PHQ-9: Brief Depression Severity Measure Score 0       Health Habits and Functional and Cognitive Screening:  Functional & Cognitive Status 2022   Do you have difficulty preparing food and eating? No   Do you have difficulty bathing yourself, getting dressed or grooming yourself? No   Do you have difficulty using the toilet? No   Do you have difficulty moving around from place to place? Yes   Do you have trouble with steps or getting out of a bed or a chair? Yes   Current Diet Well Balanced Diet   Dental Exam Up to date   Eye Exam Up to date   Exercise (times per week) 3 times per week   Current Exercises Include Gardening;Yard Work   Current Exercise Activities Include -   Do you need help using the phone?  No   Are you deaf or do you have serious difficulty hearing?  No   Do you need help with transportation? Yes   Do you need help shopping? No   Do you need help preparing meals?  No   Do you need help with housework?  No   Do you need help with laundry? No   Do you need help taking your medications? No   Do you need help managing money? No   Do  you ever drive or ride in a car without wearing a seat belt? No   Have you felt unusual stress, anger or loneliness in the last month? No   Who do you live with? Alone   If you need help, do you have trouble finding someone available to you? No   Have you been bothered in the last four weeks by sexual problems? No   Do you have difficulty concentrating, remembering or making decisions? No       Age-appropriate Screening Schedule:  Refer to the list below for future screening recommendations based on patient's age, sex and/or medical conditions. Orders for these recommended tests are listed in the plan section. The patient has been provided with a written plan.    Health Maintenance   Topic Date Due   • PAP SMEAR  03/14/2021   • ZOSTER VACCINE (3 of 3) 08/26/2022   • INFLUENZA VACCINE  10/01/2022   • LIPID PANEL  07/20/2023   • MAMMOGRAM  07/30/2023   • DXA SCAN  10/01/2023   • TDAP/TD VACCINES (2 - Td or Tdap) 11/09/2027              Assessment & Plan   CMS Preventative Services Quick Reference  Risk Factors Identified During Encounter  None Identified  The above risks/problems have been discussed with the patient.  Follow up actions/plans if indicated are seen below in the Assessment/Plan Section.  Pertinent information has been shared with the patient in the After Visit Summary.    Diagnoses and all orders for this visit:    1. Well woman exam (no gynecological exam) (Primary)    2. Medicare annual wellness visit, subsequent    3. Healthcare maintenance  -     CBC & Differential  -     Comprehensive Metabolic Panel  -     Hemoglobin A1c  -     Thyroid Panel With TSH  -     Lipid Panel With LDL / HDL Ratio    4. Mixed hyperlipidemia  -     atorvastatin (LIPITOR) 10 MG tablet; Take 1 tablet by mouth Daily.  Dispense: 90 tablet; Refill: 3    5. Chronic atrial fibrillation (HCC)        Follow Up:   No follow-ups on file.     An After Visit Summary and PPPS were made available to the patient.

## 2022-08-01 ENCOUNTER — HOSPITAL ENCOUNTER (OUTPATIENT)
Dept: MAMMOGRAPHY | Facility: HOSPITAL | Age: 67
Discharge: HOME | End: 2022-08-01
Admitting: FAMILY MEDICINE

## 2022-08-01 DIAGNOSIS — Z12.31 SCREENING MAMMOGRAM, ENCOUNTER FOR: ICD-10-CM

## 2022-08-01 PROCEDURE — 77067 SCR MAMMO BI INCL CAD: CPT

## 2022-08-01 PROCEDURE — 77063 BREAST TOMOSYNTHESIS BI: CPT

## 2022-09-06 ENCOUNTER — OFFICE VISIT (OUTPATIENT)
Dept: ORTHOPEDIC SURGERY | Facility: CLINIC | Age: 67
End: 2022-09-06

## 2022-09-06 VITALS — TEMPERATURE: 96.7 F | HEIGHT: 62 IN | BODY MASS INDEX: 42.65 KG/M2 | WEIGHT: 231.8 LBS

## 2022-09-06 DIAGNOSIS — R52 PAIN: Primary | ICD-10-CM

## 2022-09-06 DIAGNOSIS — M17.11 PRIMARY OSTEOARTHRITIS OF RIGHT KNEE: ICD-10-CM

## 2022-09-06 DIAGNOSIS — M25.561 ACUTE PAIN OF RIGHT KNEE: ICD-10-CM

## 2022-09-06 PROCEDURE — 73562 X-RAY EXAM OF KNEE 3: CPT | Performed by: ORTHOPAEDIC SURGERY

## 2022-09-06 PROCEDURE — 20610 DRAIN/INJ JOINT/BURSA W/O US: CPT | Performed by: NURSE PRACTITIONER

## 2022-09-06 PROCEDURE — 99204 OFFICE O/P NEW MOD 45 MIN: CPT | Performed by: NURSE PRACTITIONER

## 2022-09-06 RX ORDER — METHYLPREDNISOLONE ACETATE 80 MG/ML
80 INJECTION, SUSPENSION INTRA-ARTICULAR; INTRALESIONAL; INTRAMUSCULAR; SOFT TISSUE
Status: COMPLETED | OUTPATIENT
Start: 2022-09-06 | End: 2022-09-06

## 2022-09-06 RX ADMIN — METHYLPREDNISOLONE ACETATE 80 MG: 80 INJECTION, SUSPENSION INTRA-ARTICULAR; INTRALESIONAL; INTRAMUSCULAR; SOFT TISSUE at 09:39

## 2022-09-06 NOTE — PROGRESS NOTES
Patient: Bowen Concepcion  YOB: 1955 67 y.o. female  Medical Record Number: [id number]    Chief Complaints:   Chief Complaint   Patient presents with   • Right Knee - Follow-up       History of Present Illness:Bowen Concepcion is a 67 y.o. female who presents as a new patient both myself as well as to the practice with complaints of right knee pain.  Patient reports she has had pain off and on for years but it is definitely gotten worse over the last couple of months, she denies any recent injury.  She reports that the pain definitely gets worse when her weight goes up.  She describes the knee pain as a moderate sometimes severe constant ache worse with increased activity and weight, only slightly better with rest.    Allergies:   Allergies   Allergen Reactions   • Desyrel [Trazodone] Nausea And Vomiting   • Modafinil Other (See Comments)     Those manufactured in [place].  Weight gain, cough       Medications:   Current Outpatient Medications   Medication Sig Dispense Refill   • apixaban (ELIQUIS) 5 MG tablet tablet Take 1 tablet by mouth 2 (Two) Times a Day. 180 tablet 3   • Armodafinil 250 MG tablet Take 250 mg by mouth Daily.     • atorvastatin (LIPITOR) 10 MG tablet Take 1 tablet by mouth Daily. 90 tablet 3   • Calcium 500-100 MG-UNIT chewable tablet Chew 1 tablet Every 4 (Four) Hours As Needed.     • Cholecalciferol (VITAMIN D) 2000 units capsule Take 2,000 Units by mouth Daily.     • clonazePAM (KlonoPIN) 0.5 MG tablet Take 0.5 mg by mouth 2 (Two) Times a Day As Needed for Anxiety.     • metoprolol succinate XL (TOPROL-XL) 100 MG 24 hr tablet Take 1 tablet by mouth Daily. 90 tablet 0   • Multiple Vitamins-Minerals (PRESERVISION AREDS 2 PO) Take 1 tablet by mouth Daily.     • pramipexole (MIRAPEX) 0.25 MG tablet      • topiramate (TOPAMAX) 100 MG tablet Take 100 mg by mouth Daily.       No current facility-administered medications for this visit.         The following portions of the patient's  "history were reviewed and updated as appropriate: allergies, current medications, past family history, past medical history, past social history, past surgical history and problem list.    Review of Systems:   A 14 point review of systems was performed. All systems negative except pertinent positives/negative listed in HPI above    Physical Exam:   Vitals:    09/06/22 0850   Temp: 96.7 °F (35.9 °C)   TempSrc: Temporal   Weight: 105 kg (231 lb 12.8 oz)   Height: 157.5 cm (62.01\")   Body mass index is 42.39 kg/m².      General: A and O x 3, ASA, NAD    SCLERA:    Normal    Skin clear no unusual lesions noted  Right knee patient has trace amount of effusion noted with 116 degrees flexion neutral in extension with a positive Theresa negative Lockman calf soft and nontender    Radiology:  Xrays 3views (ap,lateral, sunrise) right knee were ordered and reviewed today secondary to increasing pain show bone-on-bone end-stage osteoarthritis with cyst and spur formation.  No compared to views available    Assessment/Plan: End-stage osteoarthritis right knee with increasing pain    The patient and I as well as Dr. Andujar all discussed options, she would like to proceed with right knee cortisone injection, physical therapy, weight loss, Pennsaid gel twice daily, and I will see her back in 3 months for follow-up to potentially discuss total knee replacement    Large Joint Arthrocentesis: R knee  Date/Time: 9/6/2022 9:39 AM  Consent given by: patient  Site marked: site marked  Timeout: Immediately prior to procedure a time out was called to verify the correct patient, procedure, equipment, support staff and site/side marked as required   Supporting Documentation  Indications: pain and joint swelling   Procedure Details  Location: knee - R knee  Preparation: Patient was prepped and draped in the usual sterile fashion  Needle size: 22 G  Approach: anterolateral  Medications administered: 80 mg methylPREDNISolone acetate 80 MG/ML; 2 " mL lidocaine (cardiac)  Patient tolerance: patient tolerated the procedure well with no immediate complications            Holley Agudelo, APRN  9/6/2022

## 2022-09-07 ENCOUNTER — PATIENT ROUNDING (BHMG ONLY) (OUTPATIENT)
Dept: ORTHOPEDIC SURGERY | Facility: CLINIC | Age: 67
End: 2022-09-07

## 2022-09-07 NOTE — PROGRESS NOTES
A NanoH2O Message has been sent to the patient for PATIENT ROUNDING with [facility] [place]

## 2022-09-27 ENCOUNTER — TREATMENT (OUTPATIENT)
Dept: PHYSICAL THERAPY | Facility: CLINIC | Age: 67
End: 2022-09-27

## 2022-09-27 DIAGNOSIS — R26.9 GAIT DISTURBANCE: ICD-10-CM

## 2022-09-27 DIAGNOSIS — M25.561 ACUTE PAIN OF RIGHT KNEE: Primary | ICD-10-CM

## 2022-09-27 DIAGNOSIS — M17.11 PRIMARY OSTEOARTHRITIS OF RIGHT KNEE: ICD-10-CM

## 2022-09-27 DIAGNOSIS — R29.898 DECREASED STRENGTH OF LOWER EXTREMITY: ICD-10-CM

## 2022-09-27 PROCEDURE — 97162 PT EVAL MOD COMPLEX 30 MIN: CPT | Performed by: PHYSICAL THERAPIST

## 2022-09-27 PROCEDURE — 97110 THERAPEUTIC EXERCISES: CPT | Performed by: PHYSICAL THERAPIST

## 2022-09-27 PROCEDURE — 97140 MANUAL THERAPY 1/> REGIONS: CPT | Performed by: PHYSICAL THERAPIST

## 2022-09-27 NOTE — PROGRESS NOTES
Physical Therapy Initial Evaluation and Plan of Care        Patient: Bowen Concepcion   : 1955  Diagnosis/ICD-10 Code:  Acute pain of right knee [M25.561]  Referring practitioner: ARYAN Ruth  Date of Initial Visit: 2022  Today's Date: 2022  Patient seen for 1 sessions           Subjective Questionnaire: LEFS: 46/80      Subjective Evaluation    History of Present Illness  Mechanism of injury: Pt is a 66 y/o female who presents to physical therapy with complaints of R knee pain that began several years ago and has worsened over time. Pt states it hurts the most when she is standing or walking or doing stairs. Pt states that the injection she had in her knee a couple of weeks ago helped about 50%. Pt states she is afraid of falling at times.     Pain  Current pain ratin  At worst pain ratin  Quality: dull ache and burning  Relieving factors: rest and ice  Aggravating factors: squatting, standing, stairs, ambulation, movement and prolonged positioning    Social Support  Lives in: multiple-level home (most of living space is on first floor)  Lives with: alone    Diagnostic Tests  X-ray: abnormal (end stage OA R knee)    Treatments  Previous treatment: injection treatment  Current treatment: physical therapy  Patient Goals  Patient goals for therapy: decreased edema, decreased pain, improved balance, increased motion, independence with ADLs/IADLs and increased strength  Patient goal: gardening, go to art [employer]           Objective          Neurological Testing     Additional Neurological Details  No neurological symptoms reported    Active Range of Motion   Left Knee   Flexion: 110 degrees   Extension: 0 degrees     Right Knee   Flexion: 105 degrees   Extension: 0 degrees     Passive Range of Motion   Left Knee   Flexion: 115 degrees   Extension: 0 degrees     Right Knee   Flexion: 112 degrees   Extension: 0 degrees     Strength/Myotome Testing     Left Knee   Flexion:  4+  Extension: 4+  Quadriceps contraction: fair    Right Knee   Flexion: 4  Extension: 4-  Quadriceps contraction: fair    Tests     Left Knee   Negative anterior drawer, anterior Lachman, lateral Theresa and medial Theresa.     Right Knee   Positive medial Theresa.   Negative anterior drawer, anterior Lachman and lateral Theresa.     Ambulation   Weight-Bearing Status   Weight-Bearing Status (Left): full weight bearing   Weight-Bearing Status (Right): full weight-bearing    Assistive device used: none    Ambulation: Level Surfaces   Ambulation without assistive device: independent    Ambulation: Stairs   Ascend stairs: independent  Railings: two rails  Descend stairs: independent  Railings: two rails    Observational Gait   Gait: antalgic   Increased left stance time and right swing time. Decreased walking speed, stride length, right stance time and left swing time.     Functional Assessment     Comments  5TSTS: 14.04 seconds notes: using hands  TU.87 seconds           Assessment & Plan     Assessment  Impairments: abnormal gait, abnormal muscle firing, abnormal or restricted ROM, activity intolerance, impaired balance, impaired physical strength, lacks appropriate home exercise program, pain with function and weight-bearing intolerance  Functional Limitations: lifting, walking, pushing, uncomfortable because of pain, sitting, standing, stooping and unable to perform repetitive tasks  Assessment details: Pt is a 68 y/o female who presents to physical therapy with signs and symptoms consistent with acute R knee pain. Pt has decreased R knee ROM. In addition, pt has R LE strength deficits which limits her ability to perform her ADLs/IADLs pain free. Pt has pain and difficulty performing standing, ambulation, and stair negotiation tasks. Pt would benefit from skilled physical therapy in order to address the above impairments and activity limitations outlined in this initial evaluation, in order to decrease  pain, improve functional mobility, and return to PLOF.       Barriers to therapy: anxiety, osteorthritis, osteopenia, afib, obesity  Prognosis: fair  Prognosis details: Prognosis is fair due to barriers listed above and x-ray imaging showing end stage osteoarthritis in R knee    Goals  Plan Goals: STG 2-6 weeks    1. Increase LEFS score by 10 points to show improvement in overall functional activities  2. Pt will be independent in home exercise program  3. Pt will be able to maintain 5TSTS <14.5 seconds without using UEs for support in order to show improvement in LE strength for transfers  4. Increase MMT for knee to 4+/5 in order to be able to improve ADL/IADL performance    LTG 4-6 weeks    1. Increase LEFS score by 20 points from IE to show improvement in overall functional activities  2. Pt will be able to ascend/descend a flight of stairs pain free and reciprocally using one hand rail  3. Pt will have pain free knee range of motion      Plan  Therapy options: will be seen for skilled therapy services  Planned modality interventions: cryotherapy, TENS and ultrasound  Planned therapy interventions: IADL retraining, joint mobilization, home exercise program, gait training, functional ROM exercises, flexibility, ADL retraining, balance/weight-bearing training, manual therapy, motor coordination training, neuromuscular re-education, strengthening, stretching, therapeutic activities and transfer training  Frequency: 2x week  Duration in weeks: 10  Treatment plan discussed with: patient        Manual Therapy:    10     mins  06081;  Therapeutic Exercise:    13     mins  10694;     Neuromuscular Liam:    0    mins  83582;    Therapeutic Activity:     0     mins  21463;     Gait Trainin     mins  97999;     Ultrasound:     0     mins  26749;    Electrical Stimulation:    0     mins  68309 ( );  Dry Needling     0     mins self-pay    Timed Treatment:   23   mins   Total Treatment:     40   mins    PT  SIGNATURE: Sherrie Case, PT   [place] Lic #[id number]  DATE TREATMENT INITIATED: 9/27/2022    Initial Certification  Certification Period: 12/26/2022  I certify that the therapy services are furnished while this patient is under my care.  The services outlined above are required by this patient, and will be reviewed every 90 days.     PHYSICIAN: Holley Agudelo APRN          Please sign and return via fax to [phone].. Thank you, [facility].

## 2022-09-27 NOTE — PATIENT INSTRUCTIONS
Access Code: [email or web address]  URL: [email or web address]  Date: 09/27/2022  Prepared by: Sherrie Willoughby    Exercises  Supine Gluteal Sets - 1 x daily - 7 x weekly - 3 sets - 10 reps  Supine Quad Set - 1 x daily - 7 x weekly - 3 sets - 10 reps  Straight Leg Raise - 1 x daily - 7 x weekly - 3 sets - 10 reps  Hooklying Single Leg Bent Knee Fallouts with Resistance - 1 x daily - 7 x weekly - 3 sets - 10 reps

## 2022-09-29 ENCOUNTER — TREATMENT (OUTPATIENT)
Dept: PHYSICAL THERAPY | Facility: CLINIC | Age: 67
End: 2022-09-29

## 2022-09-29 DIAGNOSIS — M25.561 ACUTE PAIN OF RIGHT KNEE: Primary | ICD-10-CM

## 2022-09-29 DIAGNOSIS — M17.11 PRIMARY OSTEOARTHRITIS OF RIGHT KNEE: ICD-10-CM

## 2022-09-29 DIAGNOSIS — R29.898 DECREASED STRENGTH OF LOWER EXTREMITY: ICD-10-CM

## 2022-09-29 DIAGNOSIS — R26.9 GAIT DISTURBANCE: ICD-10-CM

## 2022-09-29 PROCEDURE — 97112 NEUROMUSCULAR REEDUCATION: CPT | Performed by: PHYSICAL THERAPIST

## 2022-09-29 PROCEDURE — 97530 THERAPEUTIC ACTIVITIES: CPT | Performed by: PHYSICAL THERAPIST

## 2022-09-29 PROCEDURE — 97110 THERAPEUTIC EXERCISES: CPT | Performed by: PHYSICAL THERAPIST

## 2022-09-29 NOTE — PROGRESS NOTES
"   Physical Therapy Daily Progress Note        Patient: Bowen Concepcion   : 1955  Diagnosis/ICD-10 Code:  Acute pain of right knee [M25.561]  Referring practitioner: ARYAN Ruth  Date of Initial Visit: Type: THERAPY  Noted: 2022  Today's Date: 2022  Patient seen for 2 sessions         Bowen Concepcion reports:       Subjective   Pt states \"I took a step this morning and my knee did not feel secure\"  Objective   See Exercise, Manual, and Modality Logs for complete treatment.       Assessment/Plan  Pt tolerated treatment well with no adverse reactions. Pt challenged by step up activity and demonstrated signs of fatigue with stepping back with LLE while RLE remained on stair step. Pt had no signs of knee buckling. Pt benefited from v/c during standing hip abduction on RLE to prevent R hip ER compensation, pt able to correct form with cue. Continue plan of care as outlined.   Progress per Plan of Care           Manual Therapy:    0     mins  49943;  Therapeutic Exercise:    20     mins  74063;     Neuromuscular Liam:    8    mins  72088;    Therapeutic Activity:     10     mins  09260;     Gait Trainin     mins  98619;     Ultrasound:     0     mins  19855;    Electrical Stimulation:    0     mins  22229 ( );  Dry Needling     0     mins self-pay    Timed Treatment:   38   mins   Total Treatment:     48   mins    Sherrie Willoughby PT   [place] Lic #[id number]  Physical Therapist                    "

## 2022-10-04 ENCOUNTER — TREATMENT (OUTPATIENT)
Dept: PHYSICAL THERAPY | Facility: CLINIC | Age: 67
End: 2022-10-04

## 2022-10-04 DIAGNOSIS — R29.898 DECREASED STRENGTH OF LOWER EXTREMITY: ICD-10-CM

## 2022-10-04 DIAGNOSIS — R26.9 GAIT DISTURBANCE: ICD-10-CM

## 2022-10-04 DIAGNOSIS — M25.561 ACUTE PAIN OF RIGHT KNEE: Primary | ICD-10-CM

## 2022-10-04 DIAGNOSIS — M17.11 PRIMARY OSTEOARTHRITIS OF RIGHT KNEE: ICD-10-CM

## 2022-10-04 PROCEDURE — 97110 THERAPEUTIC EXERCISES: CPT | Performed by: PHYSICAL THERAPIST

## 2022-10-04 PROCEDURE — 97530 THERAPEUTIC ACTIVITIES: CPT | Performed by: PHYSICAL THERAPIST

## 2022-10-04 NOTE — PROGRESS NOTES
"   Physical Therapy Daily Progress Note        Patient: Bowen Concepcion   : 1955  Diagnosis/ICD-10 Code:  Acute pain of right knee [M25.561]  Referring practitioner: ARYAN Ruth  Date of Initial Visit: Type: THERAPY  Noted: 2022  Today's Date: 10/4/2022  Patient seen for 3 sessions         Bowen Concepcion reports:       Subjective   Pt states \"my knee is a little sore\"  Objective   See Exercise, Manual, and Modality Logs for complete treatment.       Assessment/Plan  Pt progressed in strengthening for quads to include 3 pound ankle weights added for seated long arc quads. Pt had no adverse reactions with progression and no quad lag. Pt has most limitation with supine SLR, pt is appropriately challenged without added weight for BLE strengthening. Continue plan of care to increase strength to improve gait, ADL/IADL performance.   Progress per Plan of Care           Manual Therapy:    0     mins  28671;  Therapeutic Exercise:    23     mins  77130;     Neuromuscular Liam:    7    mins  65715;    Therapeutic Activity:     10     mins  25435;     Gait Trainin     mins  73170;     Ultrasound:     0     mins  98596;    Electrical Stimulation:    0     mins  11511 ( );  Dry Needling     0     mins self-pay    Timed Treatment:   40   mins   Total Treatment:     40   mins    Sherrie Willoughby PT   [place] Lic #[id number]  Physical Therapist                    "

## 2022-10-06 ENCOUNTER — TREATMENT (OUTPATIENT)
Dept: PHYSICAL THERAPY | Facility: CLINIC | Age: 67
End: 2022-10-06

## 2022-10-06 DIAGNOSIS — M25.561 ACUTE PAIN OF RIGHT KNEE: Primary | ICD-10-CM

## 2022-10-06 DIAGNOSIS — R29.898 DECREASED STRENGTH OF LOWER EXTREMITY: ICD-10-CM

## 2022-10-06 DIAGNOSIS — R26.9 GAIT DISTURBANCE: ICD-10-CM

## 2022-10-06 DIAGNOSIS — M17.11 PRIMARY OSTEOARTHRITIS OF RIGHT KNEE: ICD-10-CM

## 2022-10-06 PROCEDURE — 97110 THERAPEUTIC EXERCISES: CPT | Performed by: PHYSICAL THERAPIST

## 2022-10-06 PROCEDURE — 97530 THERAPEUTIC ACTIVITIES: CPT | Performed by: PHYSICAL THERAPIST

## 2022-10-06 NOTE — PROGRESS NOTES
"   Physical Therapy Daily Progress Note        Patient: Bowen Concepcion   : 1955  Diagnosis/ICD-10 Code:  Acute pain of right knee [M25.561]  Referring practitioner: ARYAN Ruth  Date of Initial Visit: Type: THERAPY  Noted: 2022  Today's Date: 10/6/2022  Patient seen for 4 sessions         Bowen Concepcion reports:      Subjective   Pt states \"my right knee is a little sore today\"  Objective   See Exercise, Manual, and Modality Logs for complete treatment.       Assessment/Plan  Pt tolerated treatment well with progressions made in bilateral hamstring strengthening with supine hamstring curls with a ball for assistance. Pt able to perform without pain in knees. Pt continues to ambulate with increased lateral trunk lean over stance limb, demonstrating difficulty loading with knee flexion on RLE. Continue strengthening in order to decrease pain and stabilize gait.   Progress strengthening /stabilization /functional activity           Manual Therapy:    0     mins  35036;  Therapeutic Exercise:    25     mins  49815;     Neuromuscular Liam:    5    mins  80217;    Therapeutic Activity:     8    mins  32367;     Gait Trainin     mins  59581;     Ultrasound:     0     mins  35699;    Electrical Stimulation:    0     mins  97955 ( );  Dry Needling     0     mins self-pay    Timed Treatment:   38   mins   Total Treatment:     38   mins    Sherrie Willoughby PT   [place] Lic #[id number]  Physical Therapist                    "

## 2022-10-11 ENCOUNTER — TREATMENT (OUTPATIENT)
Dept: PHYSICAL THERAPY | Facility: CLINIC | Age: 67
End: 2022-10-11

## 2022-10-11 DIAGNOSIS — R29.898 DECREASED STRENGTH OF LOWER EXTREMITY: ICD-10-CM

## 2022-10-11 DIAGNOSIS — R26.9 GAIT DISTURBANCE: ICD-10-CM

## 2022-10-11 DIAGNOSIS — M17.11 PRIMARY OSTEOARTHRITIS OF RIGHT KNEE: ICD-10-CM

## 2022-10-11 DIAGNOSIS — M25.561 ACUTE PAIN OF RIGHT KNEE: Primary | ICD-10-CM

## 2022-10-11 PROCEDURE — 97530 THERAPEUTIC ACTIVITIES: CPT | Performed by: PHYSICAL THERAPIST

## 2022-10-11 PROCEDURE — 97110 THERAPEUTIC EXERCISES: CPT | Performed by: PHYSICAL THERAPIST

## 2022-10-11 NOTE — PROGRESS NOTES
"   Physical Therapy Daily Progress Note        Patient: Bowen Concepcion   : 1955  Diagnosis/ICD-10 Code:  Acute pain of right knee [M25.561]  Referring practitioner: ARYAN Ruth  Date of Initial Visit: Type: THERAPY  Noted: 2022  Today's Date: 10/11/2022  Patient seen for 5 sessions         Bowen Concepcion reports:       Subjective   Pt reports \"I have noticed that the farther and farther I get away from the shot that it sometimes bothers me\"  Objective   See Exercise, Manual, and Modality Logs for complete treatment.       Assessment/Plan  Pt demonstrates increased difficulty during step up with leading RLE. Pt becomes very fatigued during this activity bilaterally R>L. She benefits from verbal cueing to ensure proper exercise performance. Pt did a nice job of performing supine SLR without quad lag. Pt needs improvement with R knee control during CKC activities. Continue to progress towards goals.   Progress per Plan of Care           Manual Therapy:    0     mins  45901;  Therapeutic Exercise:    28     mins  50983;     Neuromuscular Liam:    0    mins  83664;    Therapeutic Activity:     25     mins  94485;     Gait Trainin     mins  99196;     Ultrasound:     0     mins  10415;    Electrical Stimulation:    0     mins  02282 ( );  Dry Needling     0     mins self-pay    Timed Treatment:   53   mins   Total Treatment:     53   mins    Sherrie Willoughby PT   [place] Lic #[id number]  Physical Therapist                    "

## 2022-10-13 ENCOUNTER — TREATMENT (OUTPATIENT)
Dept: PHYSICAL THERAPY | Facility: CLINIC | Age: 67
End: 2022-10-13

## 2022-10-13 DIAGNOSIS — M25.561 ACUTE PAIN OF RIGHT KNEE: Primary | ICD-10-CM

## 2022-10-13 DIAGNOSIS — R29.898 DECREASED STRENGTH OF LOWER EXTREMITY: ICD-10-CM

## 2022-10-13 DIAGNOSIS — M17.11 PRIMARY OSTEOARTHRITIS OF RIGHT KNEE: ICD-10-CM

## 2022-10-13 DIAGNOSIS — R26.9 GAIT DISTURBANCE: ICD-10-CM

## 2022-10-13 PROCEDURE — 97110 THERAPEUTIC EXERCISES: CPT | Performed by: PHYSICAL THERAPIST

## 2022-10-13 NOTE — PROGRESS NOTES
"   Physical Therapy Daily Progress Note    Patient: Bowen Concepcion   : 1955  Diagnosis/ICD-10 Code:  Acute pain of right knee [M25.561]  Referring practitioner: ARYAN Ruth  Date of Initial Visit: Type: THERAPY  Noted: 2022  Today's Date: 10/13/2022  Patient seen for 6 sessions         Bowen Concepcion reports: my (R) knee is a little \"twingy\" today but feeling okay.     Subjective     Objective   See Exercise, Manual, and Modality Logs for complete treatment.       Assessment/Plan  Subjectively, pt reports no increase of pain or discomfort with interventions performed today. Performed well with continued LE functional strengthening interventions. Continues to demonstrate fatigue with exercise in clinic. Continues to benefit from verbal/tactile cues to ensure proper form and technique for exercise performance.     Progress per Plan of Care           Manual Therapy:         mins  04208;  Therapeutic Exercise:    15     mins  29183;     Neuromuscular Liam:        mins  98350;    Therapeutic Activity:          mins  26153;     Gait Training:           mins  62284;     Ultrasound:          mins  55706;    Electrical Stimulation:         mins  34851 ( );  Dry Needling          mins self-pay    Timed Treatment:   15   mins   Total Treatment:     45   mins    Kirsty Gonzalez PTA  Physical Therapist Assistant [id number]  "

## 2022-10-19 ENCOUNTER — TREATMENT (OUTPATIENT)
Dept: PHYSICAL THERAPY | Facility: CLINIC | Age: 67
End: 2022-10-19

## 2022-10-19 DIAGNOSIS — R26.9 GAIT DISTURBANCE: ICD-10-CM

## 2022-10-19 DIAGNOSIS — R29.898 DECREASED STRENGTH OF LOWER EXTREMITY: ICD-10-CM

## 2022-10-19 DIAGNOSIS — M25.561 ACUTE PAIN OF RIGHT KNEE: Primary | ICD-10-CM

## 2022-10-19 DIAGNOSIS — M17.11 PRIMARY OSTEOARTHRITIS OF RIGHT KNEE: ICD-10-CM

## 2022-10-19 PROCEDURE — 97110 THERAPEUTIC EXERCISES: CPT | Performed by: PHYSICAL THERAPIST

## 2022-10-19 PROCEDURE — 97530 THERAPEUTIC ACTIVITIES: CPT | Performed by: PHYSICAL THERAPIST

## 2022-10-19 NOTE — PROGRESS NOTES
Physical Therapy Daily Progress Note    Patient: Bowen Concepcion   : 1955  Diagnosis/ICD-10 Code:  Acute pain of right knee [M25.561]  Referring practitioner: ARYAN Ruth  Date of Initial Visit: Type: THERAPY  Noted: 2022  Today's Date: 10/19/2022  Patient seen for 7 sessions         Bowen Concepcion reports: I had an instance a couple days ago when I twisted on my (R) knee and it was painful to put weight on it. But that pain has seemed to subside and I am not having any issues today. I just need to be more careful.     Subjective     Objective   See Exercise, Manual, and Modality Logs for complete treatment.       Assessment/Plan  Subjectively, pt reports no increase of pain or discomfort with interventions performed today. Performed well with continued knee strengthening interventions with most difficulty with respiratory endurance. Continues to benefit from verbal/tactile cues to ensure proper form and technique for exercise performance.     Progress per Plan of Care           Manual Therapy:         mins  01270;  Therapeutic Exercise:    28     mins  63018;     Neuromuscular Liam:       mins  22083;    Therapeutic Activity:     10     mins  96372;     Gait Training:           mins  92783;     Ultrasound:          mins  62442;    Electrical Stimulation:         mins  60503 ( );  Dry Needling          mins self-pay    Timed Treatment:   38   mins   Total Treatment:     38   mins    Kirsty Gonzalez PTA  Physical Therapist Assistant [phone]

## 2022-10-25 ENCOUNTER — TREATMENT (OUTPATIENT)
Dept: PHYSICAL THERAPY | Facility: CLINIC | Age: 67
End: 2022-10-25

## 2022-10-25 DIAGNOSIS — M17.11 PRIMARY OSTEOARTHRITIS OF RIGHT KNEE: ICD-10-CM

## 2022-10-25 DIAGNOSIS — M25.561 ACUTE PAIN OF RIGHT KNEE: Primary | ICD-10-CM

## 2022-10-25 DIAGNOSIS — R26.9 GAIT DISTURBANCE: ICD-10-CM

## 2022-10-25 DIAGNOSIS — R29.898 DECREASED STRENGTH OF LOWER EXTREMITY: ICD-10-CM

## 2022-10-25 PROCEDURE — 97530 THERAPEUTIC ACTIVITIES: CPT | Performed by: PHYSICAL THERAPIST

## 2022-10-25 PROCEDURE — 97110 THERAPEUTIC EXERCISES: CPT | Performed by: PHYSICAL THERAPIST

## 2022-10-25 PROCEDURE — 97140 MANUAL THERAPY 1/> REGIONS: CPT | Performed by: PHYSICAL THERAPIST

## 2022-10-25 NOTE — PROGRESS NOTES
"Re-Assessment / Re-Certification        Patient: Bowen Concepcion   : 1955  Diagnosis/ICD-10 Code:  Acute pain of right knee [M25.561]  Referring practitioner: ARYAN Ruth  Date of Initial Visit: Type: THERAPY  Noted: 2022  Today's Date: 10/25/2022  Patient seen for 8 sessions      Subjective:   Bowen Concepcion reports:   Subjective Questionnaire: LEFS: 54/80  Clinical Progress: improved  Home Program Compliance: Yes  Treatment has included: therapeutic exercise, neuromuscular re-education, manual therapy, therapeutic activity and gait training    Subjective   Pt states \"I think my knee is doing a bit better\"  Objective   Objective            Neurological Testing     Additional Neurological Details  No neurological symptoms reported     Active Range of Motion   Left Knee   Flexion: 110 degrees   Extension: 0 degrees      Right Knee   Flexion: 105 degrees   Extension: 0 degrees      Passive Range of Motion   Left Knee   Flexion: 115 degrees   Extension: 0 degrees      Right Knee   Flexion: 112 degrees   Extension: 0 degrees      Strength/Myotome Testing      Left Knee   Flexion: 4+  Extension: 4+  Quadriceps contraction: fair     Right Knee   Flexion: 4+  Extension: 4+  Quadriceps contraction: fair     Tests      Left Knee   Negative anterior drawer, anterior Lachman, lateral Theresa and medial Theresa.      Right Knee   Positive medial Theresa.   Negative anterior drawer, anterior Lachman and lateral Theresa.      Ambulation   Weight-Bearing Status   Weight-Bearing Status (Left): full weight bearing   Weight-Bearing Status (Right): full weight-bearing    Assistive device used: none     Ambulation: Level Surfaces   Ambulation without assistive device: independent     Ambulation: Stairs   Ascend stairs: independent  Railings: two rails  Descend stairs: independent  Railings: two rails     Observational Gait   Gait: antalgic   Increased left stance time and right swing time. Decreased walking " speed, stride length, right stance time and left swing time.      Functional Assessment      Comments  5TSTS: 14.04 seconds notes: using hands re-eval 10/25: 11.48 w/out hands    Assessment/Plan   Pt is a 66 y/o female who has been attending PT for R knee pain. Pt has shown good progress in strength and mobility since initial evaluation. She still demonstrates difficulty with ascending and descending stairs, but is progressing in this area. She would continue to benefit from strengthening in order to prevent knee buckling during gait and descending stairs. Pt requires additional skilled PT in order to continue to address the above impairments and activity limitations in order to decrease pain, improve functional mobility, and return to OF.  Progress toward previous goals: Partially Met    Goals  Plan Goals: STG 2-6 weeks    1. Increase LEFS score by 10 points to show improvement in overall functional activities PROGRESSING  2. Pt will be independent in home exercise program MET  3. Pt will be able to maintain 5TSTS <14.5 seconds without using UEs for support in order to show improvement in LE strength for transfers MET  4. Increase MMT for knee to 4+/5 in order to be able to improve ADL/IADL performance  MET    LTG 4-6 weeks    1. Increase LEFS score by 20 points from IE to show improvement in overall functional activities PROGRESSING  2. Pt will be able to ascend/descend a flight of stairs pain free and reciprocally using one hand rail PROGRESSING  3. Pt will have pain free knee range of motion PROGRESSING      Recommendations: Continue as planned  Timeframe: 6 weeks  Prognosis to achieve goals: good    PT Signature: Sherrie Willoughby, PT   [place] Lic #[id number]        Based upon review of the patient's progress and continued therapy plan, it is my medical opinion that Bowen Concepcion should continue physical therapy treatment at [facility]  [address] [facility], [address]  [place] [address]  [phone].        Manual Therapy:    8     mins  42279;  Therapeutic Exercise:    30     mins  96410;     Neuromuscular Liam:    0    mins  05030;    Therapeutic Activity:     15     mins  18000;     Gait Trainin     mins  97916;     Ultrasound:     0     mins  44193;    Electrical Stimulation:    0     mins  09862 ( );  Dry Needling     0     mins self-pay    Timed Treatment:   53   mins   Total Treatment:     53   mins

## 2022-10-27 ENCOUNTER — TREATMENT (OUTPATIENT)
Dept: PHYSICAL THERAPY | Facility: CLINIC | Age: 67
End: 2022-10-27

## 2022-10-27 DIAGNOSIS — R29.898 DECREASED STRENGTH OF LOWER EXTREMITY: ICD-10-CM

## 2022-10-27 DIAGNOSIS — R26.9 GAIT DISTURBANCE: ICD-10-CM

## 2022-10-27 DIAGNOSIS — M25.561 ACUTE PAIN OF RIGHT KNEE: Primary | ICD-10-CM

## 2022-10-27 DIAGNOSIS — M17.11 PRIMARY OSTEOARTHRITIS OF RIGHT KNEE: ICD-10-CM

## 2022-10-27 PROCEDURE — 97110 THERAPEUTIC EXERCISES: CPT | Performed by: PHYSICAL THERAPIST

## 2022-10-27 PROCEDURE — 97530 THERAPEUTIC ACTIVITIES: CPT | Performed by: PHYSICAL THERAPIST

## 2022-10-27 NOTE — PROGRESS NOTES
Physical Therapy Daily Treatment Note    Patient: Bowen Concepcion   : 1955  Diagnosis/ICD-10 Code:  Acute pain of right knee [M25.561]  Referring practitioner: ARYAN Ruth  Date of Initial Visit: Type: THERAPY  Noted: 2022  Today's Date: 10/27/2022  Patient seen for 9 sessions         Bowen Concepcion reports: I feel like I am getting stronger.    Subjective     Objective   See Exercise, Manual, and Modality Logs for complete treatment.       Assessment/Plan  Subjectively, pt reports no increase of pain or discomfort with interventions performed today. Performed well with continued LE strengthening and stability interventions. Continues to demonstrate ability to increase weight and resistance with some exercises. Continues to benefit from verbal/tactile cues to ensure proper form and technique for exercise performance.     Progress per Plan of Care           Manual Therapy:         mins  43372;  Therapeutic Exercise:    25     mins  52006;     Neuromuscular Liam:        mins  76167;    Therapeutic Activity:     10     mins  19908;     Gait Training:           mins  06852;     Ultrasound:          mins  70354;    Electrical Stimulation:         mins  57603 ( );  Dry Needling          mins self-pay    Timed Treatment:   35   mins   Total Treatment:     35   mins    Kirsty Gonzalez PTA  Physical Therapist Assistant [id number]

## 2022-11-01 ENCOUNTER — TREATMENT (OUTPATIENT)
Dept: PHYSICAL THERAPY | Facility: CLINIC | Age: 67
End: 2022-11-01

## 2022-11-01 DIAGNOSIS — R29.898 DECREASED STRENGTH OF LOWER EXTREMITY: ICD-10-CM

## 2022-11-01 DIAGNOSIS — M17.11 PRIMARY OSTEOARTHRITIS OF RIGHT KNEE: ICD-10-CM

## 2022-11-01 DIAGNOSIS — M25.561 ACUTE PAIN OF RIGHT KNEE: Primary | ICD-10-CM

## 2022-11-01 DIAGNOSIS — R26.9 GAIT DISTURBANCE: ICD-10-CM

## 2022-11-01 PROCEDURE — 97530 THERAPEUTIC ACTIVITIES: CPT | Performed by: PHYSICAL THERAPIST

## 2022-11-01 PROCEDURE — 97110 THERAPEUTIC EXERCISES: CPT | Performed by: PHYSICAL THERAPIST

## 2022-11-02 DIAGNOSIS — I48.20 CHRONIC ATRIAL FIBRILLATION: Chronic | ICD-10-CM

## 2022-11-03 ENCOUNTER — TREATMENT (OUTPATIENT)
Dept: PHYSICAL THERAPY | Facility: CLINIC | Age: 67
End: 2022-11-03

## 2022-11-03 DIAGNOSIS — M17.11 PRIMARY OSTEOARTHRITIS OF RIGHT KNEE: ICD-10-CM

## 2022-11-03 DIAGNOSIS — R26.9 GAIT DISTURBANCE: ICD-10-CM

## 2022-11-03 DIAGNOSIS — R29.898 DECREASED STRENGTH OF LOWER EXTREMITY: ICD-10-CM

## 2022-11-03 DIAGNOSIS — M25.561 ACUTE PAIN OF RIGHT KNEE: Primary | ICD-10-CM

## 2022-11-03 PROCEDURE — 97530 THERAPEUTIC ACTIVITIES: CPT | Performed by: PHYSICAL THERAPIST

## 2022-11-03 PROCEDURE — 97110 THERAPEUTIC EXERCISES: CPT | Performed by: PHYSICAL THERAPIST

## 2022-11-03 RX ORDER — METOPROLOL SUCCINATE 100 MG/1
100 TABLET, EXTENDED RELEASE ORAL DAILY
Qty: 90 TABLET | Refills: 2 | Status: SHIPPED | OUTPATIENT
Start: 2022-11-03

## 2022-11-03 NOTE — PROGRESS NOTES
Physical Therapy Daily Treatment Note    Patient: Bowen Concepcion   : 1955  Diagnosis/ICD-10 Code:  Acute pain of right knee [M25.561]  Referring practitioner: ARYAN Ruth  Date of Initial Visit: Type: THERAPY  Noted: 2022  Today's Date: 11/3/2022  Patient seen for 11 sessions         Bowen Concepcion reports: still having some knee achyness (R & L medial knee). I squatted down yesterday and put a lot of weight on my R knee that made it sore.     Subjective     Objective   See Exercise, Manual, and Modality Logs for complete treatment.       Assessment/Plan  Subjectively, pt reports no increase of pain or discomfort with interventions performed today. Performed well with continued knee strengthening and stability interventions. Only complaint of knee discomfort was with standing hip abduction and extension exercise when fully weightbearing on R LE. Pain rated 2-3/10.  Continues to benefit from verbal/tactile cues to ensure proper form and technique for exercise performance.     Progress per Plan of Care           Manual Therapy:         mins  98896;  Therapeutic Exercise:    25     mins  78068;     Neuromuscular Liam:        mins  46007;    Therapeutic Activity:     10     mins  47419;     Gait Training:           mins  37786;     Ultrasound:          mins  17217;    Electrical Stimulation:         mins  54165 ( );  Dry Needling          mins self-pay    Timed Treatment:   35   mins   Total Treatment:     35   mins    Kirsty Gonzalez PTA  Physical Therapist Assistant [id number]

## 2022-11-08 ENCOUNTER — TREATMENT (OUTPATIENT)
Dept: PHYSICAL THERAPY | Facility: CLINIC | Age: 67
End: 2022-11-08

## 2022-11-08 DIAGNOSIS — M25.561 ACUTE PAIN OF RIGHT KNEE: Primary | ICD-10-CM

## 2022-11-08 DIAGNOSIS — M17.11 PRIMARY OSTEOARTHRITIS OF RIGHT KNEE: ICD-10-CM

## 2022-11-08 DIAGNOSIS — R26.9 GAIT DISTURBANCE: ICD-10-CM

## 2022-11-08 DIAGNOSIS — R29.898 DECREASED STRENGTH OF LOWER EXTREMITY: ICD-10-CM

## 2022-11-08 PROCEDURE — 97530 THERAPEUTIC ACTIVITIES: CPT | Performed by: PHYSICAL THERAPIST

## 2022-11-08 PROCEDURE — 97110 THERAPEUTIC EXERCISES: CPT | Performed by: PHYSICAL THERAPIST

## 2022-11-08 NOTE — PROGRESS NOTES
"   Physical Therapy Daily Progress Note      Patient: Bowen Concepcion   : 1955  Diagnosis/ICD-10 Code:  Acute pain of right knee [M25.561]  Referring practitioner: ARYAN Ruth  Date of Initial Visit: Type: THERAPY  Noted: 2022  Today's Date: 2022  Patient seen for 12 sessions         Bowen Concepcion reports:       Subjective   Pt states \"I am doing okay today\"  Objective   See Exercise, Manual, and Modality Logs for complete treatment.       Assessment/Plan  Pt progressed to include squatting and bending activities in order to improve BLE strength during functional activities, such as pick objects up from floor, reaching at various heights, and sit to stand transfers. Pt needs continued improvement in strength in B quads and hips in order to improve these ADLs/IADLs. During bending task to retrieve item from floor, pt slowly slid to floor due to not having proper footing prior to bend. Pt able to perform kneel to stand transfer independently. Pt had no adverse reactions and no reports of knee pain during or post session. Pt able to continue remainder of PT exercises with no LOB or adverse reactions. Continue to progress towards goals.   Progress per Plan of Care           Manual Therapy:    0     mins  65567;  Therapeutic Exercise:    15     mins  97335;     Neuromuscular Liam:    0    mins  41134;    Therapeutic Activity:     15     mins  55041;     Gait Trainin     mins  87813;     Ultrasound:     0     mins  40784;    Electrical Stimulation:    0     mins  21340 ( );  Dry Needling     0     mins self-pay    Timed Treatment:   30   mins   Total Treatment:     30   mins    Sherrie Willoughby PT   [place] Lic #[id number]  Physical Therapist                    "

## 2022-11-10 ENCOUNTER — TREATMENT (OUTPATIENT)
Dept: PHYSICAL THERAPY | Facility: CLINIC | Age: 67
End: 2022-11-10

## 2022-11-10 DIAGNOSIS — R26.9 GAIT DISTURBANCE: ICD-10-CM

## 2022-11-10 DIAGNOSIS — R29.898 DECREASED STRENGTH OF LOWER EXTREMITY: ICD-10-CM

## 2022-11-10 DIAGNOSIS — M25.561 ACUTE PAIN OF RIGHT KNEE: Primary | ICD-10-CM

## 2022-11-10 DIAGNOSIS — M17.11 PRIMARY OSTEOARTHRITIS OF RIGHT KNEE: ICD-10-CM

## 2022-11-10 PROCEDURE — 97112 NEUROMUSCULAR REEDUCATION: CPT | Performed by: PHYSICAL THERAPIST

## 2022-11-10 PROCEDURE — 97530 THERAPEUTIC ACTIVITIES: CPT | Performed by: PHYSICAL THERAPIST

## 2022-11-10 PROCEDURE — 97110 THERAPEUTIC EXERCISES: CPT | Performed by: PHYSICAL THERAPIST

## 2022-11-10 NOTE — PROGRESS NOTES
Physical Therapy Daily Treatment Note    Patient: Bowen Concepcion   : 1955  Diagnosis/ICD-10 Code:  Acute pain of right knee [M25.561]  Referring practitioner: ARYAN Ruth  Date of Initial Visit: Type: THERAPY  Noted: 2022  Today's Date: 11/10/2022  Patient seen for 13 sessions         Bowen Concepcion reports: a little sore yesterday from the day before. Doing okay today.     Subjective     Objective   See Exercise, Manual, and Modality Logs for complete treatment.       Assessment/Plan  Subjectively, pt reports no increase of pain or discomfort with interventions performed today. Performed well with continued CKC LE strengthening and stability interventions. Continues to demonstrate fatigue with exercise progressions today. Continues to benefit from verbal/tactile cues to ensure proper form and technique for exercise performance.     Progress per Plan of Care           Manual Therapy:         mins  56953;  Therapeutic Exercise:    20     mins  06055;     Neuromuscular Liam:    10    mins  74338;    Therapeutic Activity:     10     mins  43438;     Gait Training:           mins  16809;     Ultrasound:          mins  14353;    Electrical Stimulation:         mins  51461 ( );  Dry Needling          mins self-pay    Timed Treatment:   40   mins   Total Treatment:     40   mins    Kirsty Gonzalez PTA  Physical Therapist Assistant [id number]

## 2022-11-14 ENCOUNTER — TREATMENT (OUTPATIENT)
Dept: PHYSICAL THERAPY | Facility: CLINIC | Age: 67
End: 2022-11-14

## 2022-11-14 DIAGNOSIS — M25.561 ACUTE PAIN OF RIGHT KNEE: Primary | ICD-10-CM

## 2022-11-14 DIAGNOSIS — R29.898 DECREASED STRENGTH OF LOWER EXTREMITY: ICD-10-CM

## 2022-11-14 DIAGNOSIS — R26.9 GAIT DISTURBANCE: ICD-10-CM

## 2022-11-14 DIAGNOSIS — M17.11 PRIMARY OSTEOARTHRITIS OF RIGHT KNEE: ICD-10-CM

## 2022-11-14 PROCEDURE — 97110 THERAPEUTIC EXERCISES: CPT | Performed by: PHYSICAL THERAPIST

## 2022-11-14 NOTE — PROGRESS NOTES
Physical Therapy Daily Progress Note      Patient: Bowen Concepcion   : 1955  Diagnosis/ICD-10 Code:  Acute pain of right knee [M25.561]  Referring practitioner: ARYAN Ruth  Date of Initial Visit: Type: THERAPY  Noted: 2022  Today's Date: 2022  Patient seen for 14 sessions         Bowen Concepcion reports:       Subjective   Pt reports that on Thursday and Friday she was on her feet a lot doing outside work and that on Saturday she felt fine, but that she woke up on  and her R knee all the way down to her ankle bothered her. She states that it varied in which spot it hurt and at times she didn't feel like she could put weight on it. She states that today it has since improved, but she didn't know what happened.  Objective   See Exercise, Manual, and Modality Logs for complete treatment.       Assessment/Plan  Pt had  no TTP along R ankle, calf, or knee upon assessment. No signs of infection, no c/o chills or fever. Pt educated on red flag symptoms associated with DVT. Pt demonstrates good understanding. Pt had no c/o R knee pain during ambulation in clinic or adverse reactions during RLE stretching. Pt benefits from v/c and t/c for form. Continue POC to improve ADL/IADL performance.   Progress per Plan of Care           Manual Therapy:    3     mins  22033;  Therapeutic Exercise:    23     mins  07416;     Neuromuscular Liam:    0    mins  82090;    Therapeutic Activity:     0     mins  71264;     Gait Trainin     mins  47654;     Ultrasound:     0     mins  49003;    Electrical Stimulation:    0     mins  91554 ( );  Dry Needling     0     mins self-pay    Timed Treatment:   26   mins   Total Treatment:     26   mins    Sherrie Willoughby PT   [place] Lic #[id number]  Physical Therapist

## 2022-11-17 ENCOUNTER — TREATMENT (OUTPATIENT)
Dept: PHYSICAL THERAPY | Facility: CLINIC | Age: 67
End: 2022-11-17

## 2022-11-17 DIAGNOSIS — M17.11 PRIMARY OSTEOARTHRITIS OF RIGHT KNEE: ICD-10-CM

## 2022-11-17 DIAGNOSIS — R26.9 GAIT DISTURBANCE: ICD-10-CM

## 2022-11-17 DIAGNOSIS — R29.898 DECREASED STRENGTH OF LOWER EXTREMITY: ICD-10-CM

## 2022-11-17 DIAGNOSIS — M25.561 ACUTE PAIN OF RIGHT KNEE: Primary | ICD-10-CM

## 2022-11-17 PROCEDURE — 97530 THERAPEUTIC ACTIVITIES: CPT | Performed by: PHYSICAL THERAPIST

## 2022-11-17 PROCEDURE — 97110 THERAPEUTIC EXERCISES: CPT | Performed by: PHYSICAL THERAPIST

## 2022-11-17 NOTE — PROGRESS NOTES
Physical Therapy Daily Treatment Note    Patient: Bowen Concepcion   : 1955  Diagnosis/ICD-10 Code:  Acute pain of right knee [M25.561]  Referring practitioner: ARYAN Ruth  Date of Initial Visit: Type: THERAPY  Noted: 2022  Today's Date: 2022  Patient seen for 15 sessions         Bowen Concepcion reports: feeling better than earlier this week.     Subjective     Objective   See Exercise, Manual, and Modality Logs for complete treatment.       Assessment/Plan  Subjectively, pt reports no increase of pain or discomfort with interventions performed today. Performed well with increased strengthening interventions today as pt tolerated. Continues to demonstrate fatigue with exercise, especially with cardiorespiratory effort. Continues to benefit from verbal/tactile cues to ensure proper form and technique for exercise performance.     Progress per Plan of Care           Manual Therapy:         mins  74407;  Therapeutic Exercise:    20     mins  68641;     Neuromuscular Liam:        mins  26028;    Therapeutic Activity:     15     mins  83637;     Gait Training:           mins  28240;     Ultrasound:          mins  64561;    Electrical Stimulation:         mins  82359 ( );  Dry Needling          mins self-pay    Timed Treatment:  35    mins   Total Treatment:     35   mins    Kirsty Gonzalez PTA  Physical Therapist Assistant [id number]

## 2022-11-21 ENCOUNTER — TREATMENT (OUTPATIENT)
Dept: PHYSICAL THERAPY | Facility: CLINIC | Age: 67
End: 2022-11-21

## 2022-11-21 DIAGNOSIS — M25.561 ACUTE PAIN OF RIGHT KNEE: Primary | ICD-10-CM

## 2022-11-21 DIAGNOSIS — R26.9 GAIT DISTURBANCE: ICD-10-CM

## 2022-11-21 DIAGNOSIS — R29.898 DECREASED STRENGTH OF LOWER EXTREMITY: ICD-10-CM

## 2022-11-21 DIAGNOSIS — M17.11 PRIMARY OSTEOARTHRITIS OF RIGHT KNEE: ICD-10-CM

## 2022-11-21 PROCEDURE — 97110 THERAPEUTIC EXERCISES: CPT | Performed by: PHYSICAL THERAPIST

## 2022-11-21 NOTE — PROGRESS NOTES
"Physical Therapy Daily Treatment Note      Patient: Bowen Concepcion   : 1955  Referring practitioner: ARYAN Ruth  Date of Initial Visit: Type: THERAPY  Noted: 2022  Today's Date: 2022  Patient seen for 16 sessions       Visit Diagnoses:    ICD-10-CM ICD-9-CM   1. Acute pain of right knee  M25.561 719.46   2. Primary osteoarthritis of right knee  M17.11 715.16   3. Gait disturbance  R26.9 781.2   4. Decreased strength of lower extremity  R29.898 729.89       Subjective Evaluation    History of Present Illness    Subjective comment: doing okay and ready to talk more about TKA with ortho in 2 weeks. lives alone and thinks she'll need to go to rehab post op. biggest rehab issue is managing steps without use of hands in // bars due to fear of giving way       Objective   See Exercise, Manual, and Modality Logs for complete treatment.       Assessment & Plan     Assessment    Assessment details: Worked on TKEs on cable for more strength and confidence on steps. Able to ascend 4\" with // bar assist but can't descend due to crepitus/pain.   Did well biking today vs. Nu step just to encourage use of bike with future prehab.  with bike vs. Just 85 with nu step  Pt might benefit from 3 month membership at her old gym prior to TKA which she hopes to do in the spring when weather is better. Will see how visit goes with ortho.           Timed:         Manual Therapy:         mins  24671;     Therapeutic Exercise:    45     mins  87039;     Neuromuscular Liam:        mins  48791;    Therapeutic Activity:          mins  21867;     Gait Training:           mins  51836;     Ultrasound:          mins  23566;    [place]                                   mins   32462  Self Care                            mins   12852  Canalith Repos         mins 08620      Un-Timed:  Electrical Stimulation:         mins  33672 ( );  Dry Needling          mins self-pay  Traction          mins 02302      Timed " Treatment:   45   mins   Total Treatment:     45   mins    Zorre Zeno Kimura, PT  [place] License: [id number]    [place] License:  [id number]

## 2022-11-28 ENCOUNTER — TREATMENT (OUTPATIENT)
Dept: PHYSICAL THERAPY | Facility: CLINIC | Age: 67
End: 2022-11-28

## 2022-11-28 DIAGNOSIS — M17.11 PRIMARY OSTEOARTHRITIS OF RIGHT KNEE: ICD-10-CM

## 2022-11-28 DIAGNOSIS — R29.898 DECREASED STRENGTH OF LOWER EXTREMITY: ICD-10-CM

## 2022-11-28 DIAGNOSIS — M25.561 ACUTE PAIN OF RIGHT KNEE: Primary | ICD-10-CM

## 2022-11-28 DIAGNOSIS — R26.9 GAIT DISTURBANCE: ICD-10-CM

## 2022-11-28 PROCEDURE — 97110 THERAPEUTIC EXERCISES: CPT | Performed by: PHYSICAL THERAPIST

## 2022-11-28 PROCEDURE — 97530 THERAPEUTIC ACTIVITIES: CPT | Performed by: PHYSICAL THERAPIST

## 2022-11-28 NOTE — PROGRESS NOTES
Re-Assessment / Re-Certification      Patient: Bowen Concepcion   : 1955  Diagnosis/ICD-10 Code:  Acute pain of right knee [M25.561]  Referring practitioner: ARYAN Ruth  Date of Initial Visit: Type: THERAPY  Noted: 2022  Today's Date: 2022  Patient seen for 17 sessions      Subjective:   Bowen Concepcion reports:   Subjective Questionnaire: LEFS: 43/80  Clinical Progress: improved  Home Program Compliance: Yes  Treatment has included: therapeutic exercise, neuromuscular re-education, manual therapy, therapeutic activity, gait training and cryotherapy    Subjective   Pt reports that she feels like she has gotten stronger, but that she still notices that it's hard to do some things like gardening. Pt states she was unable to get out of a chair without using her hands prior to PT  Objective    Active Range of Motion   Left Knee   Flexion: 110 degrees   Extension: 0 degrees      Right Knee   Flexion: 110 degrees   Extension: 0 degrees      Passive Range of Motion   Left Knee   Flexion: 115 degrees   Extension: 0 degrees      Right Knee   Flexion: 115 degrees   Extension: 0 degrees      Strength/Myotome Testing      Left Knee   Flexion: 5  Extension: 4+  Quadriceps contraction: fair     Right Knee   Flexion: 5  Extension: 4+  Quadriceps contraction: fair     Tests      Left Knee   Negative anterior drawer, anterior Lachman, lateral Theresa and medial Theresa.      Right Knee   Positive medial Theresa.   Negative anterior drawer, anterior Lachman and lateral Theresa.      Ambulation   Weight-Bearing Status   Weight-Bearing Status (Left): full weight bearing   Weight-Bearing Status (Right): full weight-bearing    Assistive device used: none     Ambulation: Level Surfaces   Ambulation without assistive device: independent     Ambulation: Stairs   Ascend stairs: independent  Railings: two rails  Descend stairs: independent  Railings: two rails          Functional Assessment       Comments  5TSTS: 14.04 seconds notes: using hands re-eval 11/28: 11.00 w/out hands  Assessment/Plan   Pt is a 68 y/o female who has been attending PT for ~2 months for R knee pain. Pt has demonstrated improvement in her overall BLE strength since beginning PT, and can now perform transfers (sit<>stand) without UEs. Pt can alternate LEs ascending/descending stairs, but requires the use of bilateral hand rails. Pt has since plateaued in progress in the last few weeks in strength and mboility. Pt sees MD in ~1 week for further assessment. Pt requires one additional visit of PT in order to go over and educate home exercise program that patient can continue to perform independently in order to maintain progress made in strength and mobility made in physical therapy. Pt demonstrates good understanding of current POC.   Progress toward previous goals: Partially Met    Goals  Plan Goals: STG 2-6 weeks    1. Increase LEFS score by 10 points to show improvement in overall functional activities PROGRESSING  2. Pt will be independent in home exercise program MET  3. Pt will be able to maintain 5TSTS <14.5 seconds without using UEs for support in order to show improvement in LE strength for transfers MET  4. Increase MMT for knee to 4+/5 in order to be able to improve ADL/IADL performance  MET    LTG 4-6 weeks    1. Increase LEFS score by 20 points from IE to show improvement in overall functional activities PROGRESSING  2. Pt will be able to ascend/descend a flight of stairs pain free and reciprocally using one hand rail PROGRESSING  3. Pt will have pain free knee range of motion PROGRESSING      Recommendations: Continue as planned  Timeframe: 1-2 weeks  Prognosis to achieve goals: fair    PT Signature: Sherrie Willoughby, PT   [place] Lic #[id number]        Based upon review of the patient's progress and continued therapy plan, it is my medical opinion that Bowen Concepcion should continue physical therapy treatment at [facility]  [facility]  [address] [facility], [address] [address] [phone].        Manual Therapy:    5     mins  96234;  Therapeutic Exercise:    10     mins  35290;     Neuromuscular Liam:    0    mins  74936;    Therapeutic Activity:     10     mins  31169;     Gait Trainin     mins  29583;     Ultrasound:     0     mins  75199;    Electrical Stimulation:    0     mins  69817 ( );  Dry Needling     0     mins self-pay    Timed Treatment:   25   mins   Total Treatment:     25   mins

## 2022-12-01 ENCOUNTER — TREATMENT (OUTPATIENT)
Dept: PHYSICAL THERAPY | Facility: CLINIC | Age: 67
End: 2022-12-01

## 2022-12-01 DIAGNOSIS — M17.11 PRIMARY OSTEOARTHRITIS OF RIGHT KNEE: ICD-10-CM

## 2022-12-01 DIAGNOSIS — M25.561 ACUTE PAIN OF RIGHT KNEE: Primary | ICD-10-CM

## 2022-12-01 DIAGNOSIS — R26.9 GAIT DISTURBANCE: ICD-10-CM

## 2022-12-01 DIAGNOSIS — R29.898 DECREASED STRENGTH OF LOWER EXTREMITY: ICD-10-CM

## 2022-12-01 PROCEDURE — 97530 THERAPEUTIC ACTIVITIES: CPT | Performed by: PHYSICAL THERAPIST

## 2022-12-01 PROCEDURE — 97110 THERAPEUTIC EXERCISES: CPT | Performed by: PHYSICAL THERAPIST

## 2022-12-01 NOTE — PROGRESS NOTES
"   Physical Therapy Daily Progress Note       DISCHARGE      Patient: Bowen Concepcion   : 1955  Diagnosis/ICD-10 Code:  Acute pain of right knee [M25.561]  Referring practitioner: ARYAN Ruth  Date of Initial Visit: Type: THERAPY  Noted: 2022  Today's Date: 2022  Patient seen for 18 sessions         Bowen Concepcion reports:       Subjective   Pt states \"my knee's been feeling okay\"  Objective   See Exercise, Manual, and Modality Logs for complete treatment.       Assessment/Plan  Pt is discharging today and is compliant with home exercise program. She demonstrates good understanding of the importance of continued strengthening and flexibility to maintain progress made in therapy.   Other discharge today           Manual Therapy:    0     mins  89825;  Therapeutic Exercise:    13     mins  14822;     Neuromuscular Liam:    0    mins  16395;    Therapeutic Activity:     10     mins  28079;     Gait Trainin     mins  78682;     Ultrasound:     0     mins  99133;    Electrical Stimulation:    0     mins  64770 ( );  Dry Needling     0     mins self-pay    Timed Treatment:   23   mins   Total Treatment:     23   mins    Sherrie Willoughby PT   [place] Lic #[id number]  Physical Therapist                    "

## 2022-12-08 ENCOUNTER — OFFICE VISIT (OUTPATIENT)
Dept: ORTHOPEDIC SURGERY | Facility: CLINIC | Age: 67
End: 2022-12-08

## 2022-12-08 ENCOUNTER — PREP FOR SURGERY (OUTPATIENT)
Dept: OTHER | Facility: HOSPITAL | Age: 67
End: 2022-12-08

## 2022-12-08 VITALS — WEIGHT: 231 LBS | TEMPERATURE: 96.1 F | BODY MASS INDEX: 42.51 KG/M2 | HEIGHT: 62 IN

## 2022-12-08 DIAGNOSIS — M25.561 ACUTE PAIN OF RIGHT KNEE: Primary | ICD-10-CM

## 2022-12-08 DIAGNOSIS — M17.11 PRIMARY OSTEOARTHRITIS OF RIGHT KNEE: Primary | ICD-10-CM

## 2022-12-08 PROCEDURE — 99214 OFFICE O/P EST MOD 30 MIN: CPT | Performed by: NURSE PRACTITIONER

## 2022-12-08 PROCEDURE — 20610 DRAIN/INJ JOINT/BURSA W/O US: CPT | Performed by: NURSE PRACTITIONER

## 2022-12-08 RX ORDER — METHYLPREDNISOLONE ACETATE 80 MG/ML
80 INJECTION, SUSPENSION INTRA-ARTICULAR; INTRALESIONAL; INTRAMUSCULAR; SOFT TISSUE
Status: COMPLETED | OUTPATIENT
Start: 2022-12-08 | End: 2022-12-08

## 2022-12-08 RX ORDER — POVIDONE-IODINE 10 MG/ML
SOLUTION TOPICAL ONCE
Status: CANCELLED | OUTPATIENT
Start: 2023-03-13 | End: 2022-12-08

## 2022-12-08 RX ORDER — CHLORHEXIDINE GLUCONATE 500 MG/1
CLOTH TOPICAL 2 TIMES DAILY
Status: CANCELLED | OUTPATIENT
Start: 2022-12-08

## 2022-12-08 RX ORDER — PREGABALIN 75 MG/1
150 CAPSULE ORAL ONCE
Status: CANCELLED | OUTPATIENT
Start: 2023-03-13 | End: 2022-12-08

## 2022-12-08 RX ORDER — CEFAZOLIN SODIUM IN 0.9 % NACL 3 G/100 ML
3 INTRAVENOUS SOLUTION, PIGGYBACK (ML) INTRAVENOUS ONCE
Status: CANCELLED | OUTPATIENT
Start: 2023-03-13 | End: 2022-12-08

## 2022-12-08 RX ORDER — MELOXICAM 15 MG/1
15 TABLET ORAL ONCE
Status: CANCELLED | OUTPATIENT
Start: 2023-03-13 | End: 2022-12-08

## 2022-12-08 RX ADMIN — METHYLPREDNISOLONE ACETATE 80 MG: 80 INJECTION, SUSPENSION INTRA-ARTICULAR; INTRALESIONAL; INTRAMUSCULAR; SOFT TISSUE at 09:23

## 2022-12-08 NOTE — PROGRESS NOTES
Patient: Bowen Concepcion  YOB: 1955 67 y.o. female  Medical Record Number: [id number]    Chief Complaints:   Chief Complaint   Patient presents with   • Right Knee - Follow-up       History of Present Illness:Bowen Concepcion is a 67 y.o. female who presents with complaints of worsening in right knee pain.  She only gets temporary relief with cortisone injection she is gone to physical therapy she is not able to take oral anti-inflammatories.  Patient reports that the pain is worsening she would like to proceed with surgery    Allergies:   Allergies   Allergen Reactions   • Desyrel [Trazodone] Nausea And Vomiting   • Modafinil Other (See Comments)     Those manufactured in [place].  Weight gain, cough       Medications:   Current Outpatient Medications   Medication Sig Dispense Refill   • apixaban (ELIQUIS) 5 MG tablet tablet Take 1 tablet by mouth 2 (Two) Times a Day. 180 tablet 3   • Armodafinil 250 MG tablet Take 250 mg by mouth Daily.     • atorvastatin (LIPITOR) 10 MG tablet Take 1 tablet by mouth Daily. 90 tablet 3   • Calcium 500-100 MG-UNIT chewable tablet Chew 1 tablet Every 4 (Four) Hours As Needed.     • Cholecalciferol (VITAMIN D) 2000 units capsule Take 2,000 Units by mouth Daily.     • clonazePAM (KlonoPIN) 0.5 MG tablet Take 0.5 mg by mouth 2 (Two) Times a Day As Needed for Anxiety.     • metoprolol succinate XL (TOPROL-XL) 100 MG 24 hr tablet Take 1 tablet by mouth Daily. 90 tablet 2   • Multiple Vitamins-Minerals (PRESERVISION AREDS 2 PO) Take 1 tablet by mouth Daily.     • pramipexole (MIRAPEX) 0.25 MG tablet      • topiramate (TOPAMAX) 100 MG tablet Take 100 mg by mouth Daily.       No current facility-administered medications for this visit.         The following portions of the patient's history were reviewed and updated as appropriate: allergies, current medications, past family history, past medical history, past social history, past surgical history and problem list.    Review of  "Systems:   A 14 point review of systems was performed. All systems negative except pertinent positives/negative listed in HPI above    Physical Exam:   Vitals:    12/08/22 0857   Temp: 96.1 °F (35.6 °C)   TempSrc: Temporal   Weight: 105 kg (231 lb)   Height: 157.5 cm (62.01\")       General: A and O x 3, ASA, NAD    SCLERA:    Normal    Skin clear no unusual lesions noted  Right knee patient has trace amount of effusion noted with 110 degrees flexion neutral in extension with a positive Theresa negative Lockman calf soft and nontender       Radiology:  Xrays 3views (ap,lateral, sunrise) right knee were reviewed that were done previously show bone-on-bone end-stage osteoarthritis with cyst and spur formation.    Assessment/Plan: End-stage osteoarthritis right knee with severe pain    Patient I discussed options, she would like to proceed with right total knee replacement in March, currently her BMI is 42 she was advised she needs to get that less than 40 in order for us to be able to proceed with surgery, she can however be scheduled and she knows that if she is not at goal her surgery may very well be canceled.  Patient verbalized understanding.  She is also been to contact her cardiologist about cardiac clearance she currently takes Eliquis.  Patient will continue working on physical therapy exercises would like a right knee cortisone injection today  Continuation of conservative management vs. TKA discussed.  The patient wishes to proceed with total knee replacement.  At this point the patient has failed the full compliment of conservative treatment and stating complete understanding of the risks/benefits/ anternatives wishes to proceed with surgical treatment.    Risk and benefits of surgery were reviewed.  Including, but not limited to, blood clots or pulmonary embolism, anesthesia risk, infection, fracture, skin/leg numbness, persistent pain/crepitance/popping/catching, failure of the implant, need for future " surgeries, hematoma, possible nerve or blood vessel injury, need for transfusion, and potential risk of stroke,heart attack or death, among others.  The patient understands and wishes to proceed.     It was explained that if tissue has been repaired or reconstructed, there is also an increased chance of failure which may require further management.  Following the completion of the discussion, the patient expressed understanding of this planned course of care, all their questions were answered and consent will be obtained preoperatively.    Operative Plan: Smith and Nephoscar Oxinium Total Knee Replacement an overnight staywith outpatient rehab    Large Joint Arthrocentesis: R knee  Date/Time: 12/8/2022 9:23 AM  Consent given by: patient  Site marked: site marked  Timeout: Immediately prior to procedure a time out was called to verify the correct patient, procedure, equipment, support staff and site/side marked as required   Supporting Documentation  Indications: pain and joint swelling   Procedure Details  Location: knee - R knee  Preparation: Patient was prepped and draped in the usual sterile fashion  Needle size: 22 G  Approach: anterolateral  Medications administered: 80 mg methylPREDNISolone acetate 80 MG/ML; 2 mL lidocaine (cardiac)  Patient tolerance: patient tolerated the procedure well with no immediate complications              Holley Agudelo, APRN  12/8/2022

## 2022-12-20 PROBLEM — M17.11 PRIMARY OSTEOARTHRITIS OF RIGHT KNEE: Status: ACTIVE | Noted: 2022-12-20

## 2022-12-21 ENCOUNTER — OFFICE VISIT (OUTPATIENT)
Dept: OBSTETRICS AND GYNECOLOGY | Facility: CLINIC | Age: 67
End: 2022-12-21

## 2022-12-21 VITALS
RESPIRATION RATE: 18 BRPM | BODY MASS INDEX: 41.92 KG/M2 | SYSTOLIC BLOOD PRESSURE: 116 MMHG | DIASTOLIC BLOOD PRESSURE: 72 MMHG | WEIGHT: 227.8 LBS | HEIGHT: 62 IN

## 2022-12-21 DIAGNOSIS — R31.0 GROSS HEMATURIA: ICD-10-CM

## 2022-12-21 DIAGNOSIS — N95.0 PMB (POSTMENOPAUSAL BLEEDING): ICD-10-CM

## 2022-12-21 DIAGNOSIS — Z12.4 SCREENING FOR MALIGNANT NEOPLASM OF CERVIX: Primary | ICD-10-CM

## 2022-12-21 DIAGNOSIS — N95.2 ATROPHIC VAGINITIS: ICD-10-CM

## 2022-12-21 PROCEDURE — 3015F CERV CANCER SCREEN DOCD: CPT | Performed by: OBSTETRICS & GYNECOLOGY

## 2022-12-21 PROCEDURE — 99204 OFFICE O/P NEW MOD 45 MIN: CPT | Performed by: OBSTETRICS & GYNECOLOGY

## 2022-12-21 PROCEDURE — G0101 CA SCREEN;PELVIC/BREAST EXAM: HCPCS | Performed by: OBSTETRICS & GYNECOLOGY

## 2022-12-21 RX ORDER — ACETAMINOPHEN 500 MG
500 TABLET ORAL EVERY 6 HOURS PRN
COMMUNITY

## 2022-12-21 NOTE — PROGRESS NOTES
Chief Complaint  Gynecologic Exam (Pt here for an annual. Last pap was at least 3 years ago. Last mammo was a couple months ago. States she noticed blood in her urine back in . Then last month she was bleeding like a period.  )    Subjective        Bowen Concepcion presents to [facility]  History of Present Illness  Patient presents for annual exam, but also having concerns about bleeding.  She reports bleeding beginning roughly 6 months ago.  She first noticed it just when urinating.  She reported for seeing some spotting of bright red blood when urinating, she reports progressively the bleeding has gotten heavier.  She states that about 10 days ago it was enough to soil her underwear and she had to start wearing a pad.  She reports that at that time it was about like a light period.  She also reports vaginal dryness and irritation.  She is not sexually active.  She does report some occasional stress urinary incontinence with rare episodes of urgency as well.  She denies dysuria.  She denies malodorous urine.  Menarche was at age 12.  She reports menopause around age 41.  She reports that she briefly took hormone replacement therapy but did not feel it helped significantly so stopped it.  She states she has never taken vaginal estrogen.  She reports she recently was prescribed Eliquis but has not started taking it yet.  She is not otherwise on any blood thinners.  Last Pap smear was 2018 and was negative.  DEXA scan was within the last 2 years.  Colonoscopy up-to-date  Last mammogram 6 months ago.    Menstrual History:  OB History        0    Para   0    Term   0       0    AB   0    Living   0       SAB   0    IAB   0    Ectopic   0    Molar   0    Multiple   0    Live Births   0               Menarche age: 12  No LMP recorded. Patient is postmenopausal.     Past Medical History:   Diagnosis Date   • Abnormal ECG    • Abnormal weight loss    • Anxiety    • Arthritis      OSTEOARTHRITIS RIGHT KNEE AND INDEX FINGERS   • Asthma [id number]    Shortness of breath if weight too high   • Atrial fibrillation (HCC)    • Atrial fibrillation with RVR (HCC)    • Bipolar disorder (HCC)    • Cataract 2015    developing   • Cholelithiasis     uncompllicated   • Chronic atrial fibrillation (HCC) 12/09/2017   • Depression    • GERD (gastroesophageal reflux disease)     with spicy food   • Headache     when I am stressed   • Hyperkalemia    • Hyperthyroidism 12/12/2017   • Hypokalemia    • Jaundice    • Low back pain 2013    seems to get worse with age   • Mild mitral regurgitation    • Mild tricuspid regurgitation    • Mixed hyperlipidemia    • Morbid obesity with BMI of 40.0-44.9, adult ([facility]) 06/03/2019   • Nausea and vomiting    • BARBIE (obstructive sleep apnea)    • Osteopenia osteopenia   • Scoliosis    • Sleep apnea    • Uterine mass     muliple masses, 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   • Visual impairment cataracts developing     Past Surgical History:   Procedure Laterality Date   • COLONOSCOPY  2005   • COLONOSCOPY N/A 06/02/2022    Procedure: COLONOSCOPY to terminal ileum with polypectomy (cold bx);  Surgeon: Inder Brand MD;  Location: [facility];  Service: Gastroenterology;  Laterality: N/A;  pre - family hx colon polyps  post - polyps, hemorrhoids   • UPPER GASTROINTESTINAL ENDOSCOPY  2001       Family History   Problem Relation Age of Onset   • Heart disease Mother    • Hypertension Mother    • Depression Mother    • Miscarriages / Stillbirths Mother    • Cancer Father    • Depression Sister    • Cancer Sister    • Depression Sister    • Depression Brother    • Colon polyps Brother    • Atrial fibrillation Brother    • Depression Brother    • Depression Brother    • Vision loss Maternal Grandmother    • Arthritis Maternal Grandmother    • Vision loss Paternal Grandmother    • Breast cancer Maternal Aunt    • Breast cancer Maternal Aunt    • Diabetes Paternal Grandfather    • Ovarian cancer Neg Hx         Social History     Tobacco Use   • Smoking status: Former     Packs/day: 2.00     Years: 26.00     Pack years: 52.00     Types: Cigarettes     Quit date: 1997     Years since quittin.3   • Smokeless tobacco: Never   • Tobacco comments:     caffeine use- tea   Vaping Use   • Vaping Use: Never used   Substance Use Topics   • Alcohol use: No   • Drug use: No         Current Outpatient Medications:   •  acetaminophen (TYLENOL) 500 MG tablet, Take 500 mg by mouth Every 6 (Six) Hours As Needed for Mild Pain., Disp: , Rfl:   •  Calcium 500-100 MG-UNIT chewable tablet, Chew 1 tablet Every 4 (Four) Hours As Needed., Disp: , Rfl:   •  Cholecalciferol (VITAMIN D) 2000 units capsule, Take 2,000 Units by mouth Daily., Disp: , Rfl:   •  clonazePAM (KlonoPIN) 0.5 MG tablet, Take 0.5 mg by mouth 2 (Two) Times a Day As Needed for Anxiety., Disp: , Rfl:   •  metoprolol succinate XL (TOPROL-XL) 100 MG 24 hr tablet, Take 1 tablet by mouth Daily., Disp: 90 tablet, Rfl: 2  •  pramipexole (MIRAPEX) 0.25 MG tablet, , Disp: , Rfl:   •  topiramate (TOPAMAX) 100 MG tablet, Take 100 mg by mouth Daily., Disp: , Rfl:   •  apixaban (ELIQUIS) 5 MG tablet tablet, Take 1 tablet by mouth 2 (Two) Times a Day., Disp: 180 tablet, Rfl: 3  •  Armodafinil 250 MG tablet, Take 250 mg by mouth Daily., Disp: , Rfl:   •  atorvastatin (LIPITOR) 10 MG tablet, Take 1 tablet by mouth Daily., Disp: 90 tablet, Rfl: 3  •  Multiple Vitamins-Minerals (PRESERVISION AREDS 2 PO), Take 1 tablet by mouth Daily., Disp: , Rfl:     Allergies   Allergen Reactions   • Desyrel [Trazodone] Nausea And Vomiting   • Modafinil Other (See Comments)     Those manufactured in [place].  Weight gain, cough       ROS:  Constitutional: No fevers, chills, sweats   Eye: No recent visual problems, denies blurry vision   HEENT: No ear pain, nasal congestion, sore throat, voice changes   Respiratory: No shortness of breath, cough, pain on breathing   Cardiovascular: No Chest pain,  "palpitations   Gastrointestinal: No nausea, vomiting, diarrhea, constipation   Genitourinary: Pos hematuria;  dysuria, lesions on genitalia   Hema/Lymph: Negative for bruising, no edema   Endocrine: Negative for excessive thirst, excessive hunger, heat or cold intolerance   Musculoskeletal: No joint pain, muscle pain, decreased range of motion   Integumentary: No rash, pruritus, abrasions, lesions   Neurologic: No weakness, numbness, headaches   Psychiatric: No anxiety, depression, mood changes     Patient reports that she is currently experiencing symptoms of urinary incontinence.        Objective   Vital Signs:  /72   Resp 18   Ht 157.5 cm (62\")   Wt 103 kg (227 lb 12.8 oz)   BMI 41.67 kg/m²   Estimated body mass index is 41.67 kg/m² as calculated from the following:    Height as of this encounter: 157.5 cm (62\").    Weight as of this encounter: 103 kg (227 lb 12.8 oz).          Physical Exam   Exam performed in the presence of a female chaperone  Patient has provided verbal consent to proceed with exam.    Gen: No acute distress, awake and oriented times three  HENT: Normocephalic, atraumatic, Moist mucous membranes  Eyes: PERRLA, EOMI  Lungs: Normal work of breathing, lungs clear bilaterally, no crackles/wheezes  Breast: Symmetrical. No skin changes or nipple retractions. No lumps or masses bilaterally. No tenderness bilaterally.  Abdomen: soft, nontender, no masses or hernia, no hepatosplenomegaly, non distended, normoactive bowel sounds  Normal external female genitalia, mild erythema noted bilateral vulva.  No suspicious lesions.  Urethra: Normal meatus, no caruncle  Bladder: nontender  Vagina: Significant vaginal atrophy noted.  Loss of rugae noted.  Pale epithelium in multiple areas noted.  There also a few mild areas of erosions which bleed easily.  Speculum exam is limited.  The patient has very narrow introitus and atrophic changes.  Had to use the smallest speculum and cannot open all the way " due to patient discomfort.  Was unable to visualize the cervix.  Performed blind pass with the Pap.  Cervix: Could not be visualized as above, was able to palpate the cervix.  Cervix feels normal and closed.  There are no lesions noted digitally.  Bimanual exam is limited due to body habitus  Uterus: Anteverted, normal size and shape, nontender  Adnexa: No masses or tenderness  External anal exam: Normal appearance, no lesions or hemorrhoids  Rectal: Deferred  Skin: Warm and dry, no rashes  Psych: Good judgement and insight, normal affect and mood  Neuro: CN 2-12 intact, no gross deficits        Result Review :                Assessment and Plan   Diagnoses and all orders for this visit:    1. Gross hematuria (Primary)  -     Urinalysis With Microscopic - Urine, Clean Catch; Future  -     Urine Culture - Urine, Urine, Clean Catch  Send off urine.  If signs of UTI will treat and then repeat the urinalysis.  If hematuria is noted without signs of infection, would refer to urology for further evaluation.  Patient is a former smoker slightly increased risk for bladder malignancy.  Explained these findings and recommendations to the patient.    2. PMB (postmenopausal bleeding)  -     US Pelvis Complete; Future  Overall, feel that atrophic vaginitis is the most likely reason for symptoms; however, further work-up is necessary.  We will send UA and culture as above.  Would obtain pelvic ultrasound to evaluate for uterine or endometrial pathology.  Depending on ultrasound findings may need endometrial biopsy.  Would be very difficult to perform endometrial biopsy in the office given the pelvic exam today.  Would likely require hysteroscopy, dilation and curettage in the OR.  If all findings are normal may be able to start treatment of atrophic vaginitis with topical vaginal estrogen    3. Atrophic vaginitis  As above  4. Screening for malignant neoplasm of cervix  -     IGP, Apt HPV,rfx 16 / 18,45             Follow Up    Return in about 1 week (around 12/28/2022).  Patient was given instructions and counseling regarding her condition or for health maintenance advice. Please see specific information pulled into the AVS if appropriate.

## 2022-12-23 ENCOUNTER — PATIENT MESSAGE (OUTPATIENT)
Dept: OBSTETRICS AND GYNECOLOGY | Facility: CLINIC | Age: 67
End: 2022-12-23

## 2022-12-23 ENCOUNTER — PATIENT ROUNDING (BHMG ONLY) (OUTPATIENT)
Dept: OBSTETRICS AND GYNECOLOGY | Facility: CLINIC | Age: 67
End: 2022-12-23

## 2022-12-23 LAB
BACTERIA UR CULT: NORMAL
BACTERIA UR CULT: NORMAL

## 2022-12-23 NOTE — PROGRESS NOTES
My chart message has been sent to the patient for PATIENT ROUNDING with [facility].

## 2022-12-31 LAB
CYTOLOGIST CVX/VAG CYTO: NORMAL
CYTOLOGY CVX/VAG DOC CYTO: NORMAL
CYTOLOGY CVX/VAG DOC THIN PREP: NORMAL
DX ICD CODE: NORMAL
HIV 1 & 2 AB SER-IMP: NORMAL
HPV I/H RISK 4 DNA CVX QL PROBE+SIG AMP: NEGATIVE
OTHER STN SPEC: NORMAL
STAT OF ADQ CVX/VAG CYTO-IMP: NORMAL

## 2023-01-10 ENCOUNTER — TELEPHONE (OUTPATIENT)
Dept: OBSTETRICS AND GYNECOLOGY | Facility: CLINIC | Age: 68
End: 2023-01-10

## 2023-01-10 ENCOUNTER — OFFICE VISIT (OUTPATIENT)
Dept: OBSTETRICS AND GYNECOLOGY | Facility: CLINIC | Age: 68
End: 2023-01-10
Payer: MEDICARE

## 2023-01-10 VITALS
BODY MASS INDEX: 42.88 KG/M2 | HEIGHT: 62 IN | SYSTOLIC BLOOD PRESSURE: 124 MMHG | WEIGHT: 233 LBS | DIASTOLIC BLOOD PRESSURE: 81 MMHG

## 2023-01-10 DIAGNOSIS — D25.1 FIBROIDS, INTRAMURAL: ICD-10-CM

## 2023-01-10 DIAGNOSIS — R93.89 THICKENED ENDOMETRIUM: ICD-10-CM

## 2023-01-10 DIAGNOSIS — N95.0 PMB (POSTMENOPAUSAL BLEEDING): Primary | ICD-10-CM

## 2023-01-10 DIAGNOSIS — R19.00 PELVIC MASS: ICD-10-CM

## 2023-01-10 DIAGNOSIS — N83.8 COMPLEX CYST OF UTERINE ADNEXA: ICD-10-CM

## 2023-01-10 PROCEDURE — 99214 OFFICE O/P EST MOD 30 MIN: CPT | Performed by: OBSTETRICS & GYNECOLOGY

## 2023-01-10 NOTE — TELEPHONE ENCOUNTER
Viri, with [facility] is requesting to speak with you regarding pt's referral.     She said you can contact her at her direct line.   Viri #[phone]    Thanks!

## 2023-01-10 NOTE — TELEPHONE ENCOUNTER
----- Message from Maximiliano Calle MD sent at 1/10/2023 12:30 PM EST -----  Pt needs [facility] referral for suspicious pelvic mass

## 2023-01-10 NOTE — TELEPHONE ENCOUNTER
URSULA Meredith w/Gy Oncology, she is trying to expedite scheduling for this pt.     She is asking if a the mass you reported is the cyst, or is there a mass and a cyst?  She also asked if pathology has been done.    We did see your order for ovarian malignancy-do you know if pt stopped to have her labs drawn (it does not appear so)?    Thanks!  Becca

## 2023-01-10 NOTE — PROGRESS NOTES
Chief Complaint  Follow-up (Discuss u/s)    Subjective        Bowen Concepcion presents to [facility]  History of Present Illness  Patient is here for follow-up of postmenopausal bleeding.  She reports having some bleeding off and on since time of her last visit.  She previously had a bleeding that was similar to menstrual cycle.  She is otherwise in her usual state of health at this time.    The following portions of the patient's history were reviewed and updated as appropriate: allergies, current medications, past family history, past medical history, past social history, past surgical history, and problem list.      Objective   Vital Signs:  /81   Ht 157.5 cm (62\")   Wt 106 kg (233 lb)   BMI 42.62 kg/m²   Estimated body mass index is 42.62 kg/m² as calculated from the following:    Height as of this encounter: 157.5 cm (62\").    Weight as of this encounter: 106 kg (233 lb).             Physical Exam   General: No acute distress, awake and oriented x3  Psychiatric: good judgment and insight, normal mood  Neurological: cranial nerves II through XII intact, no deficits    Result Review :      Data reviewed: Radiologic studies Pelvic US    Pelvic ultrasound today reveals a large complex pelvic mass suspicious for gynecologic malignancy.  Additionally uterus is enlarged with multiple fibroids.  Endometrial lining is thickened.         Assessment and Plan   Diagnoses and all orders for this visit:    1. PMB (postmenopausal bleeding) (Primary)  -     Ambulatory Referral to Gynecologic Oncology    Suspicious findings on pelvic ultrasound today.  Office-based endometrial biopsy would be very difficult in this patient due to her body habitus and difficulty with pelvic exam.  Certainly would require evaluation of endometrium; however, this may be performed by GYN oncology, especially if hysterectomy is planned anyway    2. Pelvic mass  -     Ambulatory Referral to  Gynecologic Oncology  -     Ovarian Malignancy Risk-NANCY  -     Lactate Dehydrogenase    Very concerning pelvic ultrasound today.  Findings discussed with the patient's at length.  Significance of these ultrasound findings were discussed.  Stressed the need for very short-term follow-up due to the possibility of cancer.  We will obtain referral to gynecologic oncology.  The patient is advised to call me within a week if she has not had this appointment set up.  We will send tumor markers today.    3. Fibroids, intramural    4. Complex cyst of uterine adnexa  -     Ambulatory Referral to Gynecologic Oncology  -     Ovarian Malignancy Risk-NANCY  -     Lactate Dehydrogenase    5. Thickened endometrium  -     Ambulatory Referral to Gynecologic Oncology    As above       I spent 30 minutes caring for Bowen on this date of service. This time includes time spent by me in the following activities:preparing for the visit, reviewing tests, obtaining and/or reviewing a separately obtained history, performing a medically appropriate examination and/or evaluation , counseling and educating the patient/family/caregiver, ordering medications, tests, or procedures and documenting information in the medical record  Follow Up   No follow-ups on file.  Patient was given instructions and counseling regarding her condition or for health maintenance advice. Please see specific information pulled into the AVS if appropriate.

## 2023-01-11 ENCOUNTER — PATIENT MESSAGE (OUTPATIENT)
Dept: OBSTETRICS AND GYNECOLOGY | Facility: CLINIC | Age: 68
End: 2023-01-11
Payer: MEDICARE

## 2023-01-11 ENCOUNTER — DOCUMENTATION (OUTPATIENT)
Dept: OBSTETRICS AND GYNECOLOGY | Facility: CLINIC | Age: 68
End: 2023-01-11
Payer: MEDICARE

## 2023-01-11 LAB
CANCER AG125 SERPL-ACNC: 49.7 U/ML (ref 0–38.1)
HE4 SERPL-SCNC: 297 PMOL/L (ref 0–96.5)
LABORATORY COMMENT REPORT: ABNORMAL
LDH SERPL L TO P-CCNC: 187 U/L (ref 135–214)
POSTMENOPAUSAL INTERP: HIGH: NORMAL
PREMENOPAUSAL INTERP: HIGH: NORMAL
ROMA SCORE POSTMEN SERPL: 6.66
ROMA SCORE PREMEN SERPL: 8.58

## 2023-01-11 NOTE — PROGRESS NOTES
Patient's Autumn panel is reviewed.  Shows elevated risk of malignancy in a postmenopausal patient.    I called the GYN oncology office and spoke with Viri in order to try to expedite her appointment.  I wanted to be sure that they were aware of the patient's lab work results and entire clinical picture.    I was informed that the patient's chart was in review and they would be reaching out to her for an appointment.    I stated my concerns that I feel the patient likely has an ovarian malignancy given the imaging findings and now tumor marker results.    I will continue to try to reach out to the patient to help her expedite this appointment as best as I can.  Additionally, I have sent a [employer] message to the patient to be on the look out for a phone call from the GYN oncology office.

## 2023-01-12 ENCOUNTER — TELEPHONE (OUTPATIENT)
Dept: ORTHOPEDIC SURGERY | Facility: CLINIC | Age: 68
End: 2023-01-12
Payer: MEDICARE

## 2023-01-12 NOTE — TELEPHONE ENCOUNTER
Provider: YIN HERNDON     Caller: SHAYLA IBARRA     Relationship to Patient: SELF    Phone Number: [phone]    Reason for Call: PATIENT CALLED AND WOULD LIKE A CALL BACK WHEN SHE CAN PLEASE ADVISE

## 2023-01-13 NOTE — PROGRESS NOTES
Age: 67 y.o.  Sex: female  :  1955  MRN: [id number]     REFERRING PHYSICIAN: Maximiliano Roman*     Bowen Concepcion presents to [facility] for evaluation and management of large ovarian cystic mass as well as thickened endometrium. She was having PMB and TVUS was ordered which showed the mass. She has no h/o breast ovarian or colon CA in family. She is UTD with CSP most recent was 6 mos ago. Patient states she is not taking Eliquis.       Oncology/Hematology History Overview Note   Bowen Concepcion is a 67 y.o. female referred by Dr. Maximiliano Barrett for PMB, Pelvic Mass & Thickened Endometrium     • 22: Pap smear - negative   • 1/10/23: TVUS - Uterus 8.53 x 5.21 x 5.57 cm. ET 1.26 cm. Ovaries not clearly identified. There are multiple small uterine fibroids noted.  The largest measures 2.3 x 2.5 cm.  There is an additional 1.5 x 1.7 cm fibroid. There is a large midline complex pelvic mass with irregular borders and at least 1 internal septation This mass measures 14.0 x 11.5 x 9.0 cm The mass appears to be arising from one of the ovaries.      23: New Patient           Date            Value      Ref Range            Status  01/10/2023      49.7      0.0 - 38.1 U/mL          Final           Past Medical History:  Past Medical History:   Diagnosis Date   • Abnormal ECG 2018   • Abnormal weight loss    • Anxiety    • Arthritis     OSTEOARTHRITIS RIGHT KNEE AND INDEX FINGERS   • Asthma [id number]    Shortness of breath if weight too high   • Atrial fibrillation (HCC)    • Atrial fibrillation with RVR (HCC)    • Bipolar disorder (HCC)    • Cataract     developing   • Cholelithiasis     uncompllicated   • Chronic atrial fibrillation (HCC) 2017   • Depression    • GERD (gastroesophageal reflux disease)     with spicy food   • Headache     when I am stressed   • Hyperkalemia    • Hyperthyroidism 2017   • Hypokalemia    • Jaundice    • Low back pain  2013    seems to get worse with age   • Mild mitral regurgitation    • Mild tricuspid regurgitation    • Mixed hyperlipidemia    • Morbid obesity with BMI of 40.0-44.9, adult ([facility]) 06/03/2019   • Nausea and vomiting    • BARBIE (obstructive sleep apnea)    • Osteopenia osteopenia   • Scoliosis    • Sleep apnea    • Uterine mass     muliple masses, 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   • Visual impairment cataracts developing       Past Surgical History:  Past Surgical History:   Procedure Laterality Date   • COLONOSCOPY  2005   • COLONOSCOPY N/A 06/02/2022    Procedure: COLONOSCOPY to terminal ileum with polypectomy (cold bx);  Surgeon: Inder Brand MD;  Location: [facility] ENDOSCOPY;  Service: Gastroenterology;  Laterality: N/A;  pre - family hx colon polyps  post - polyps, hemorrhoids   • UPPER GASTROINTESTINAL ENDOSCOPY  2001        Current Meds:  Scheduled Meds:  Continuous Infusions:No current facility-administered medications for this visit.    PRN Meds:.    ALLERGIES:  Allergies   Allergen Reactions   • Desyrel [Trazodone] Nausea And Vomiting   • Modafinil Other (See Comments)     Those manufactured in [place].  Weight gain, cough       ROS:  CONSTITUTIONAL:  Denies fever or chills.   NEUROLOGIC:  Denies headache, focal weakness or sensory changes.   EYES:  Denies change in visual acuity.  HEENT:  Denies nasal congestion or sore throat.   RESPIRATORY:  Denies cough or shortness of breath.   CARDIOVASCULAR:  Denies chest pain or edema.   GI:  Denies abdominal pain, nausea, vomiting, bloody stools or diarrhea.   :  Denies dysuria, leaking or incontinence.  MUSCULOSKELETAL:  Denies back pain or joint pain.   INTEGUMENT:  Denies rash.   ENDOCRINE:  Denies polyuria or polydipsia.   LYMPHATIC:  Denies swollen glands or lymphedema.   PSYCHIATRIC:  Denies depression or anxiety.      PHYSICAL EXAM:  Vitals:    01/18/23 1012   BP: 112/76   Pulse: 94   Resp: 12   Temp: 98.9 °F (37.2 °C)   TempSrc: Temporal   SpO2: 97%   Weight: 104 kg (229  "lb)   Height: 153.7 cm (60.5\")   PainSc:   3   PainLoc: Abdomen  Comment: dicomfort     Body mass index is 43.99 kg/m².  Current Status 1/18/2023   ECOG score 0       GEN: alert and oriented x 3, normal affect, well nourished and hydrated.  CARDIO: regular rate and rhythm.  PULM: Lungs CTA b.l, no RRW   ABD:Soft, nontender, nondistended.   GYN: defer to OR   EXT: No petechiae, bruising, rash, candida. No CCE.           Result Review :  The pertinent labs, images, and/or pathology as noted in the oncology history were reviewed independently and discussed with the patient.   Abena Abdalla, DO   01/18/2023    BH LABS:   No new     BH IMAGING:  TVUS only       ASSESSMENT :  • Large 14cm adnexal mass on TVUS   • Thickened endometirum   • Obesity BMI 44  •           PLAN :  • Obtain CT scan to assess for LAD or caricnomatosis, more in depth image of abomen.   Perioperative mgmt: PAT only - stay off Eliquis, states she has not started    The case was reviewed with the patient in detail, taking into consideration symptoms, laboratory results, imaging, pathology and physical exam findings.  At this point in time, I am recommending exploratory laparotomy, total abdominal hysterectomy, bilateral salpingooophorecotmy, possible staging (pelvic and periaortic lymphadenectomy with omentectomy.      Risks, benefits, alternatives and indications to the procedure were reviewed in detail.    Risks of surgery include bleeding/hemorrhage which may require transfusion and associated transfusion risk (transfusion reaction, HCV, TRALI ); Infection, which may require antibiotic therapy or abscess drainage; Damage to surrounding internal organs (bowel, bladder, or urinary tract) which may result in obstruction or fistula; Nerve, or vascular injury which may result in numbness, pain, swelling or loss of strength. Risk of possible incomplete resolution of symptoms or failure to cure.  She understands that it is possible that " intraoperative findings may warrant further treatment.  There is a low, but real, risk of unanticipated return to the OR for a problem associated with the surgery and a remote possibility of death.      • All questions were addressed and answered to the patient's satisfaction. She indicates understanding of these risks and desires to proceed. She wishes me to use my judgement to do the procedure that I feel is in her best interest. Surgical consents to be signed in Pre Op prior to surgery.   •             Abena Abdalla D.O  1/18/2023    Gynecologic Oncology   [address] [place] [address]  [phone] office

## 2023-01-13 NOTE — TELEPHONE ENCOUNTER
Patient called to let me know that unfortunately she has had a another health issue,, she needs to hold off on surgery at this time, so please cancel her surgery and she will give me a call back when she is doing better and would like to proceed with surgery

## 2023-01-18 ENCOUNTER — HOSPITAL ENCOUNTER (OUTPATIENT)
Dept: CT IMAGING | Facility: HOSPITAL | Age: 68
Discharge: HOME | End: 2023-01-18
Payer: MEDICARE

## 2023-01-18 ENCOUNTER — HOSPITAL ENCOUNTER (OUTPATIENT)
Dept: CARDIOLOGY | Facility: HOSPITAL | Age: 68
Discharge: HOME | End: 2023-01-18
Payer: MEDICARE

## 2023-01-18 ENCOUNTER — LAB (OUTPATIENT)
Dept: LAB | Facility: HOSPITAL | Age: 68
End: 2023-01-18
Payer: MEDICARE

## 2023-01-18 ENCOUNTER — PREP FOR SURGERY (OUTPATIENT)
Dept: OTHER | Facility: HOSPITAL | Age: 68
End: 2023-01-18
Payer: MEDICARE

## 2023-01-18 ENCOUNTER — HOSPITAL ENCOUNTER (OUTPATIENT)
Dept: GENERAL RADIOLOGY | Facility: HOSPITAL | Age: 68
Discharge: HOME | End: 2023-01-18
Payer: MEDICARE

## 2023-01-18 ENCOUNTER — OFFICE VISIT (OUTPATIENT)
Dept: GYNECOLOGIC ONCOLOGY | Facility: CLINIC | Age: 68
End: 2023-01-18
Payer: MEDICARE

## 2023-01-18 VITALS
DIASTOLIC BLOOD PRESSURE: 76 MMHG | OXYGEN SATURATION: 97 % | SYSTOLIC BLOOD PRESSURE: 112 MMHG | HEART RATE: 94 BPM | WEIGHT: 229 LBS | TEMPERATURE: 98.9 F | HEIGHT: 61 IN | RESPIRATION RATE: 12 BRPM | BODY MASS INDEX: 43.23 KG/M2

## 2023-01-18 DIAGNOSIS — N83.8 OVARIAN MASS, LEFT: ICD-10-CM

## 2023-01-18 DIAGNOSIS — R19.00 PELVIC MASS: ICD-10-CM

## 2023-01-18 DIAGNOSIS — N83.8 OVARIAN MASS, LEFT: Primary | ICD-10-CM

## 2023-01-18 DIAGNOSIS — R19.00 PELVIC MASS: Primary | ICD-10-CM

## 2023-01-18 DIAGNOSIS — Z79.01 ANTICOAGULATED: ICD-10-CM

## 2023-01-18 DIAGNOSIS — N83.8 OVARIAN MASS: Primary | ICD-10-CM

## 2023-01-18 LAB
ANION GAP SERPL CALCULATED.3IONS-SCNC: 11.1 MMOL/L (ref 5–15)
APTT PPP: 28 SECONDS (ref 22.7–35.4)
BASOPHILS # BLD AUTO: 0.05 10*3/MM3 (ref 0–0.2)
BASOPHILS NFR BLD AUTO: 0.7 % (ref 0–1.5)
BILIRUB UR QL STRIP: NEGATIVE
BUN SERPL-MCNC: 13 MG/DL (ref 8–23)
BUN/CREAT SERPL: 18.8 (ref 7–25)
CALCIUM SPEC-SCNC: 9.7 MG/DL (ref 8.6–10.5)
CHLORIDE SERPL-SCNC: 110 MMOL/L (ref 98–107)
CLARITY UR: CLEAR
CO2 SERPL-SCNC: 23.9 MMOL/L (ref 22–29)
COLOR UR: YELLOW
CREAT BLDA-MCNC: 0.7 MG/DL (ref 0.6–1.3)
CREAT SERPL-MCNC: 0.69 MG/DL (ref 0.57–1)
DEPRECATED RDW RBC AUTO: 42 FL (ref 37–54)
EGFRCR SERPLBLD CKD-EPI 2021: 94.7 ML/MIN/1.73
EOSINOPHIL # BLD AUTO: 0.16 10*3/MM3 (ref 0–0.4)
EOSINOPHIL NFR BLD AUTO: 2.2 % (ref 0.3–6.2)
ERYTHROCYTE [DISTWIDTH] IN BLOOD BY AUTOMATED COUNT: 14.1 % (ref 12.3–15.4)
GLUCOSE SERPL-MCNC: 97 MG/DL (ref 65–99)
GLUCOSE UR STRIP-MCNC: NEGATIVE MG/DL
HCT VFR BLD AUTO: 41.3 % (ref 34–46.6)
HGB BLD-MCNC: 13.8 G/DL (ref 12–15.9)
HGB UR QL STRIP.AUTO: NEGATIVE
IMM GRANULOCYTES # BLD AUTO: 0.01 10*3/MM3 (ref 0–0.05)
IMM GRANULOCYTES NFR BLD AUTO: 0.1 % (ref 0–0.5)
INR PPP: 1 (ref 0.9–1.1)
KETONES UR QL STRIP: NEGATIVE
LEUKOCYTE ESTERASE UR QL STRIP.AUTO: NEGATIVE
LYMPHOCYTES # BLD AUTO: 1.9 10*3/MM3 (ref 0.7–3.1)
LYMPHOCYTES NFR BLD AUTO: 26.4 % (ref 19.6–45.3)
MCH RBC QN AUTO: 27.8 PG (ref 26.6–33)
MCHC RBC AUTO-ENTMCNC: 33.4 G/DL (ref 31.5–35.7)
MCV RBC AUTO: 83.3 FL (ref 79–97)
MONOCYTES # BLD AUTO: 0.62 10*3/MM3 (ref 0.1–0.9)
MONOCYTES NFR BLD AUTO: 8.6 % (ref 5–12)
NEUTROPHILS NFR BLD AUTO: 4.47 10*3/MM3 (ref 1.7–7)
NEUTROPHILS NFR BLD AUTO: 62 % (ref 42.7–76)
NITRITE UR QL STRIP: NEGATIVE
NRBC BLD AUTO-RTO: 0 /100 WBC (ref 0–0.2)
PH UR STRIP.AUTO: 6.5 [PH] (ref 5–8)
PLATELET # BLD AUTO: 227 10*3/MM3 (ref 140–450)
PMV BLD AUTO: 11 FL (ref 6–12)
POTASSIUM SERPL-SCNC: 3.8 MMOL/L (ref 3.5–5.2)
PROT UR QL STRIP: NEGATIVE
PROTHROMBIN TIME: 13.3 SECONDS (ref 11.7–14.2)
QT INTERVAL: 320 MS
RBC # BLD AUTO: 4.96 10*6/MM3 (ref 3.77–5.28)
SODIUM SERPL-SCNC: 145 MMOL/L (ref 136–145)
SP GR UR STRIP: 1.01 (ref 1–1.03)
UROBILINOGEN UR QL STRIP: NORMAL
WBC NRBC COR # BLD: 7.21 10*3/MM3 (ref 3.4–10.8)

## 2023-01-18 PROCEDURE — 81003 URINALYSIS AUTO W/O SCOPE: CPT

## 2023-01-18 PROCEDURE — 71045 X-RAY EXAM CHEST 1 VIEW: CPT

## 2023-01-18 PROCEDURE — 93005 ELECTROCARDIOGRAM TRACING: CPT | Performed by: OBSTETRICS & GYNECOLOGY

## 2023-01-18 PROCEDURE — 80048 BASIC METABOLIC PNL TOTAL CA: CPT

## 2023-01-18 PROCEDURE — 85025 COMPLETE CBC W/AUTO DIFF WBC: CPT

## 2023-01-18 PROCEDURE — 25010000002 IOPAMIDOL 61 % SOLUTION: Performed by: OBSTETRICS & GYNECOLOGY

## 2023-01-18 PROCEDURE — 99204 OFFICE O/P NEW MOD 45 MIN: CPT | Performed by: OBSTETRICS & GYNECOLOGY

## 2023-01-18 PROCEDURE — 93010 ELECTROCARDIOGRAM REPORT: CPT | Performed by: INTERNAL MEDICINE

## 2023-01-18 PROCEDURE — 85610 PROTHROMBIN TIME: CPT

## 2023-01-18 PROCEDURE — 85730 THROMBOPLASTIN TIME PARTIAL: CPT

## 2023-01-18 PROCEDURE — 74177 CT ABD & PELVIS W/CONTRAST: CPT

## 2023-01-18 PROCEDURE — 36415 COLL VENOUS BLD VENIPUNCTURE: CPT

## 2023-01-18 PROCEDURE — 82565 ASSAY OF CREATININE: CPT

## 2023-01-18 RX ORDER — SODIUM CHLORIDE 0.9 % (FLUSH) 0.9 %
10 SYRINGE (ML) INJECTION EVERY 12 HOURS SCHEDULED
Status: CANCELLED | OUTPATIENT
Start: 2023-01-24

## 2023-01-18 RX ORDER — ONDANSETRON 2 MG/ML
4 INJECTION INTRAMUSCULAR; INTRAVENOUS EVERY 6 HOURS PRN
Status: CANCELLED | OUTPATIENT
Start: 2023-01-18

## 2023-01-18 RX ORDER — SODIUM CHLORIDE, SODIUM LACTATE, POTASSIUM CHLORIDE, CALCIUM CHLORIDE 600; 310; 30; 20 MG/100ML; MG/100ML; MG/100ML; MG/100ML
100 INJECTION, SOLUTION INTRAVENOUS CONTINUOUS
Status: CANCELLED | OUTPATIENT
Start: 2023-01-24

## 2023-01-18 RX ORDER — SODIUM CHLORIDE 0.9 % (FLUSH) 0.9 %
10 SYRINGE (ML) INJECTION AS NEEDED
Status: CANCELLED | OUTPATIENT
Start: 2023-01-24

## 2023-01-18 RX ORDER — SODIUM CHLORIDE 9 MG/ML
40 INJECTION, SOLUTION INTRAVENOUS AS NEEDED
Status: CANCELLED | OUTPATIENT
Start: 2023-01-24

## 2023-01-18 RX ORDER — CEFAZOLIN SODIUM 2 G/100ML
2 INJECTION, SOLUTION INTRAVENOUS ONCE
Status: CANCELLED | OUTPATIENT
Start: 2023-01-24 | End: 2023-01-18

## 2023-01-18 RX ADMIN — IOPAMIDOL 85 ML: 612 INJECTION, SOLUTION INTRAVENOUS at 11:50

## 2023-01-19 ENCOUNTER — TELEPHONE (OUTPATIENT)
Dept: GYNECOLOGIC ONCOLOGY | Facility: CLINIC | Age: 68
End: 2023-01-19
Payer: MEDICARE

## 2023-01-20 ENCOUNTER — TELEPHONE (OUTPATIENT)
Dept: CARDIOLOGY | Facility: CLINIC | Age: 68
End: 2023-01-20
Payer: MEDICARE

## 2023-01-20 NOTE — TELEPHONE ENCOUNTER
Aria with Dr. Peraza's is calling for surgical clearance on this patient who is scheduled for an ex lap total hysterectomy on 1/24/23.    Per Aria the patient reports she never began taking eliquis-never filled the prescription.    LOV 6/6/22 with JA    Fax written clearance to # [phone]    Cassandra Andujar RN  [facility] Triage  01/20/23 14:40 EST

## 2023-01-20 NOTE — TELEPHONE ENCOUNTER
Dr. Murdock is out of the office today.    Dr. Peraza's office is calling for perioperative cardiac risk assessment for a lap total hysterectomy on 1/24/2023.    In June 2022, he had a discussion with her about starting apixaban for [id number] score of 2 putting her at high risk for thromboembolic event.  He asked her to stop the naproxen and aspirin.    I just spoke with her via phone.  She is not taking the aspirin or the apixaban.  She felt like it was not a good idea and then she started developing vaginal bleeding.  She is at acceptable risk to proceed with hysterectomy from a heart standpoint.  We have not seen her in 6 months, but there is a possibility that she remains in atrial fibrillation.  She is at potential high risk of a thromboembolic event if she remains in atrial fibrillation.  This was discussed with the patient and she verbalizes understanding.    I recommended that she make an appointment to see Dr. Murdock after her surgery.    Letter faxed.

## 2023-01-24 ENCOUNTER — PATIENT ROUNDING (BHMG ONLY) (OUTPATIENT)
Dept: GYNECOLOGIC ONCOLOGY | Facility: CLINIC | Age: 68
End: 2023-01-24
Payer: MEDICARE

## 2023-01-24 ENCOUNTER — ANESTHESIA (OUTPATIENT)
Dept: PERIOP | Facility: HOSPITAL | Age: 68
DRG: 740 | End: 2023-01-24
Payer: MEDICARE

## 2023-01-24 ENCOUNTER — ANESTHESIA EVENT (OUTPATIENT)
Dept: PERIOP | Facility: HOSPITAL | Age: 68
DRG: 740 | End: 2023-01-24
Payer: MEDICARE

## 2023-01-24 ENCOUNTER — HOSPITAL ENCOUNTER (INPATIENT)
Facility: HOSPITAL | Age: 68
LOS: 4 days | Discharge: NURSING, REHAB OR LONG-TERM CARE | DRG: 740 | End: 2023-01-28
Attending: OBSTETRICS & GYNECOLOGY | Admitting: OBSTETRICS & GYNECOLOGY
Payer: MEDICARE

## 2023-01-24 DIAGNOSIS — N83.8 OVARIAN MASS, LEFT: ICD-10-CM

## 2023-01-24 DIAGNOSIS — Z98.890 POST-OPERATIVE STATE: Primary | ICD-10-CM

## 2023-01-24 LAB
ABO GROUP BLD: NORMAL
BLD GP AB SCN SERPL QL: NEGATIVE
RH BLD: POSITIVE
T&S EXPIRATION DATE: NORMAL

## 2023-01-24 PROCEDURE — 25010000002 FENTANYL CITRATE (PF) 50 MCG/ML SOLUTION: Performed by: NURSE ANESTHETIST, CERTIFIED REGISTERED

## 2023-01-24 PROCEDURE — C9290 INJ, BUPIVACAINE LIPOSOME: HCPCS | Performed by: ANESTHESIOLOGY

## 2023-01-24 PROCEDURE — 88360 TUMOR IMMUNOHISTOCHEM/MANUAL: CPT | Performed by: OBSTETRICS & GYNECOLOGY

## 2023-01-24 PROCEDURE — 25010000002 KETOROLAC TROMETHAMINE PER 15 MG: Performed by: OBSTETRICS & GYNECOLOGY

## 2023-01-24 PROCEDURE — 88341 IMHCHEM/IMCYTCHM EA ADD ANTB: CPT

## 2023-01-24 PROCEDURE — 25010000002 ONDANSETRON PER 1 MG: Performed by: NURSE ANESTHETIST, CERTIFIED REGISTERED

## 2023-01-24 PROCEDURE — 25010000002 CEFAZOLIN IN DEXTROSE 2-4 GM/100ML-% SOLUTION: Performed by: NURSE ANESTHETIST, CERTIFIED REGISTERED

## 2023-01-24 PROCEDURE — 0UT90ZZ RESECTION OF UTERUS, OPEN APPROACH: ICD-10-PCS | Performed by: OBSTETRICS & GYNECOLOGY

## 2023-01-24 PROCEDURE — 0 BUPIVACAINE LIPOSOME 1.3 % SUSPENSION: Performed by: ANESTHESIOLOGY

## 2023-01-24 PROCEDURE — 88309 TISSUE EXAM BY PATHOLOGIST: CPT | Performed by: OBSTETRICS & GYNECOLOGY

## 2023-01-24 PROCEDURE — 07BC0ZX EXCISION OF PELVIS LYMPHATIC, OPEN APPROACH, DIAGNOSTIC: ICD-10-PCS | Performed by: OBSTETRICS & GYNECOLOGY

## 2023-01-24 PROCEDURE — 0 HYDROMORPHONE HCL PF 50 MG/5ML SOLUTION: Performed by: OBSTETRICS & GYNECOLOGY

## 2023-01-24 PROCEDURE — 0UT70ZZ RESECTION OF BILATERAL FALLOPIAN TUBES, OPEN APPROACH: ICD-10-PCS | Performed by: OBSTETRICS & GYNECOLOGY

## 2023-01-24 PROCEDURE — 88112 CYTOPATH CELL ENHANCE TECH: CPT | Performed by: OBSTETRICS & GYNECOLOGY

## 2023-01-24 PROCEDURE — 25010000002 HYDROMORPHONE PER 4 MG: Performed by: NURSE ANESTHETIST, CERTIFIED REGISTERED

## 2023-01-24 PROCEDURE — 58210 EXTENSIVE HYSTERECTOMY: CPT | Performed by: OBSTETRICS & GYNECOLOGY

## 2023-01-24 PROCEDURE — 86901 BLOOD TYPING SEROLOGIC RH(D): CPT | Performed by: OBSTETRICS & GYNECOLOGY

## 2023-01-24 PROCEDURE — 86850 RBC ANTIBODY SCREEN: CPT | Performed by: OBSTETRICS & GYNECOLOGY

## 2023-01-24 PROCEDURE — 25010000002 PROPOFOL 10 MG/ML EMULSION: Performed by: NURSE ANESTHETIST, CERTIFIED REGISTERED

## 2023-01-24 PROCEDURE — 88305 TISSUE EXAM BY PATHOLOGIST: CPT | Performed by: OBSTETRICS & GYNECOLOGY

## 2023-01-24 PROCEDURE — 0UT20ZZ RESECTION OF BILATERAL OVARIES, OPEN APPROACH: ICD-10-PCS | Performed by: OBSTETRICS & GYNECOLOGY

## 2023-01-24 PROCEDURE — 25010000002 CEFAZOLIN IN DEXTROSE 2-4 GM/100ML-% SOLUTION: Performed by: OBSTETRICS & GYNECOLOGY

## 2023-01-24 PROCEDURE — 86900 BLOOD TYPING SEROLOGIC ABO: CPT | Performed by: OBSTETRICS & GYNECOLOGY

## 2023-01-24 PROCEDURE — 0DBU0ZZ EXCISION OF OMENTUM, OPEN APPROACH: ICD-10-PCS | Performed by: OBSTETRICS & GYNECOLOGY

## 2023-01-24 PROCEDURE — 58210 EXTENSIVE HYSTERECTOMY: CPT | Performed by: PHYSICIAN ASSISTANT

## 2023-01-24 PROCEDURE — 88307 TISSUE EXAM BY PATHOLOGIST: CPT | Performed by: OBSTETRICS & GYNECOLOGY

## 2023-01-24 PROCEDURE — 88331 PATH CONSLTJ SURG 1 BLK 1SPC: CPT | Performed by: OBSTETRICS & GYNECOLOGY

## 2023-01-24 PROCEDURE — 25010000002 HYDROMORPHONE 1 MG/ML SOLUTION: Performed by: NURSE ANESTHETIST, CERTIFIED REGISTERED

## 2023-01-24 PROCEDURE — 07BD0ZX EXCISION OF AORTIC LYMPHATIC, OPEN APPROACH, DIAGNOSTIC: ICD-10-PCS | Performed by: OBSTETRICS & GYNECOLOGY

## 2023-01-24 PROCEDURE — 88341 IMHCHEM/IMCYTCHM EA ADD ANTB: CPT | Performed by: OBSTETRICS & GYNECOLOGY

## 2023-01-24 PROCEDURE — 88342 IMHCHEM/IMCYTCHM 1ST ANTB: CPT | Performed by: OBSTETRICS & GYNECOLOGY

## 2023-01-24 PROCEDURE — 25010000002 MAGNESIUM SULFATE PER 500 MG OF MAGNESIUM: Performed by: NURSE ANESTHETIST, CERTIFIED REGISTERED

## 2023-01-24 PROCEDURE — 25010000002 DEXAMETHASONE SODIUM PHOSPHATE 20 MG/5ML SOLUTION: Performed by: NURSE ANESTHETIST, CERTIFIED REGISTERED

## 2023-01-24 DEVICE — HEMOST ABS SURGICEL PWDR 3GM: Type: IMPLANTABLE DEVICE | Site: ABDOMEN | Status: FUNCTIONAL

## 2023-01-24 DEVICE — HORIZON TI LG 6 CLIPS/CART
Type: IMPLANTABLE DEVICE | Site: ABDOMEN | Status: FUNCTIONAL
Brand: WECK

## 2023-01-24 DEVICE — HORIZON TI ML 6 CLIPS/CART
Type: IMPLANTABLE DEVICE | Site: ABDOMEN | Status: FUNCTIONAL
Brand: WECK

## 2023-01-24 RX ORDER — FENTANYL CITRATE 50 UG/ML
50 INJECTION, SOLUTION INTRAMUSCULAR; INTRAVENOUS
Status: CANCELLED | OUTPATIENT
Start: 2023-01-24

## 2023-01-24 RX ORDER — SODIUM CHLORIDE 9 MG/ML
40 INJECTION, SOLUTION INTRAVENOUS AS NEEDED
Status: DISCONTINUED | OUTPATIENT
Start: 2023-01-24 | End: 2023-01-24 | Stop reason: HOSPADM

## 2023-01-24 RX ORDER — ATORVASTATIN CALCIUM 20 MG/1
10 TABLET, FILM COATED ORAL DAILY
Status: DISCONTINUED | OUTPATIENT
Start: 2023-01-24 | End: 2023-01-28 | Stop reason: HOSPADM

## 2023-01-24 RX ORDER — ONDANSETRON 2 MG/ML
4 INJECTION INTRAMUSCULAR; INTRAVENOUS EVERY 6 HOURS PRN
Status: DISCONTINUED | OUTPATIENT
Start: 2023-01-24 | End: 2023-01-28 | Stop reason: HOSPADM

## 2023-01-24 RX ORDER — EPHEDRINE SULFATE 50 MG/ML
5 INJECTION, SOLUTION INTRAVENOUS ONCE AS NEEDED
Status: DISCONTINUED | OUTPATIENT
Start: 2023-01-24 | End: 2023-01-24 | Stop reason: HOSPADM

## 2023-01-24 RX ORDER — HYDRALAZINE HYDROCHLORIDE 20 MG/ML
5 INJECTION INTRAMUSCULAR; INTRAVENOUS
Status: CANCELLED | OUTPATIENT
Start: 2023-01-24

## 2023-01-24 RX ORDER — SODIUM CHLORIDE, SODIUM LACTATE, POTASSIUM CHLORIDE, CALCIUM CHLORIDE 600; 310; 30; 20 MG/100ML; MG/100ML; MG/100ML; MG/100ML
100 INJECTION, SOLUTION INTRAVENOUS CONTINUOUS
Status: DISCONTINUED | OUTPATIENT
Start: 2023-01-24 | End: 2023-01-25

## 2023-01-24 RX ORDER — PROMETHAZINE HYDROCHLORIDE 25 MG/1
25 TABLET ORAL ONCE AS NEEDED
Status: CANCELLED | OUTPATIENT
Start: 2023-01-24

## 2023-01-24 RX ORDER — DOCUSATE SODIUM 100 MG/1
100 CAPSULE, LIQUID FILLED ORAL 2 TIMES DAILY PRN
Status: DISCONTINUED | OUTPATIENT
Start: 2023-01-24 | End: 2023-01-28 | Stop reason: HOSPADM

## 2023-01-24 RX ORDER — DEXAMETHASONE SODIUM PHOSPHATE 4 MG/ML
INJECTION, SOLUTION INTRA-ARTICULAR; INTRALESIONAL; INTRAMUSCULAR; INTRAVENOUS; SOFT TISSUE AS NEEDED
Status: DISCONTINUED | OUTPATIENT
Start: 2023-01-24 | End: 2023-01-24 | Stop reason: SURG

## 2023-01-24 RX ORDER — METOPROLOL SUCCINATE 100 MG/1
100 TABLET, EXTENDED RELEASE ORAL DAILY
Status: DISCONTINUED | OUTPATIENT
Start: 2023-01-25 | End: 2023-01-28 | Stop reason: HOSPADM

## 2023-01-24 RX ORDER — CEFAZOLIN SODIUM 2 G/100ML
INJECTION, SOLUTION INTRAVENOUS AS NEEDED
Status: DISCONTINUED | OUTPATIENT
Start: 2023-01-24 | End: 2023-01-24 | Stop reason: SURG

## 2023-01-24 RX ORDER — PROMETHAZINE HYDROCHLORIDE 12.5 MG/1
12.5 SUPPOSITORY RECTAL EVERY 6 HOURS PRN
Status: DISCONTINUED | OUTPATIENT
Start: 2023-01-24 | End: 2023-01-28 | Stop reason: HOSPADM

## 2023-01-24 RX ORDER — HYDROMORPHONE HYDROCHLORIDE 1 MG/ML
0.5 INJECTION, SOLUTION INTRAMUSCULAR; INTRAVENOUS; SUBCUTANEOUS
Status: CANCELLED | OUTPATIENT
Start: 2023-01-24

## 2023-01-24 RX ORDER — SODIUM CHLORIDE 0.9 % (FLUSH) 0.9 %
10 SYRINGE (ML) INJECTION AS NEEDED
Status: DISCONTINUED | OUTPATIENT
Start: 2023-01-24 | End: 2023-01-24 | Stop reason: HOSPADM

## 2023-01-24 RX ORDER — HYDRALAZINE HYDROCHLORIDE 20 MG/ML
5 INJECTION INTRAMUSCULAR; INTRAVENOUS
Status: DISCONTINUED | OUTPATIENT
Start: 2023-01-24 | End: 2023-01-24 | Stop reason: HOSPADM

## 2023-01-24 RX ORDER — MODAFINIL 100 MG/1
200 TABLET ORAL DAILY
Status: DISCONTINUED | OUTPATIENT
Start: 2023-01-24 | End: 2023-01-28 | Stop reason: HOSPADM

## 2023-01-24 RX ORDER — PROMETHAZINE HYDROCHLORIDE 25 MG/1
25 SUPPOSITORY RECTAL ONCE AS NEEDED
Status: CANCELLED | OUTPATIENT
Start: 2023-01-24

## 2023-01-24 RX ORDER — SODIUM CHLORIDE 9 MG/ML
100 INJECTION, SOLUTION INTRAVENOUS CONTINUOUS
Status: DISCONTINUED | OUTPATIENT
Start: 2023-01-24 | End: 2023-01-25

## 2023-01-24 RX ORDER — CALCIUM CARBONATE 200(500)MG
2 TABLET,CHEWABLE ORAL 3 TIMES DAILY PRN
Status: DISCONTINUED | OUTPATIENT
Start: 2023-01-24 | End: 2023-01-28 | Stop reason: HOSPADM

## 2023-01-24 RX ORDER — BUPIVACAINE HYDROCHLORIDE 2.5 MG/ML
INJECTION, SOLUTION EPIDURAL; INFILTRATION; INTRACAUDAL
Status: COMPLETED | OUTPATIENT
Start: 2023-01-24 | End: 2023-01-24

## 2023-01-24 RX ORDER — SIMETHICONE 80 MG
120 TABLET,CHEWABLE ORAL
Status: DISCONTINUED | OUTPATIENT
Start: 2023-01-24 | End: 2023-01-28 | Stop reason: HOSPADM

## 2023-01-24 RX ORDER — HYDROCODONE BITARTRATE AND ACETAMINOPHEN 5; 325 MG/1; MG/1
1 TABLET ORAL EVERY 4 HOURS PRN
Status: DISCONTINUED | OUTPATIENT
Start: 2023-01-24 | End: 2023-01-28 | Stop reason: HOSPADM

## 2023-01-24 RX ORDER — BISACODYL 10 MG
10 SUPPOSITORY, RECTAL RECTAL DAILY PRN
Status: DISCONTINUED | OUTPATIENT
Start: 2023-01-24 | End: 2023-01-28 | Stop reason: HOSPADM

## 2023-01-24 RX ORDER — ONDANSETRON 2 MG/ML
INJECTION INTRAMUSCULAR; INTRAVENOUS AS NEEDED
Status: DISCONTINUED | OUTPATIENT
Start: 2023-01-24 | End: 2023-01-24 | Stop reason: SURG

## 2023-01-24 RX ORDER — PROMETHAZINE HYDROCHLORIDE 12.5 MG/1
12.5 TABLET ORAL EVERY 6 HOURS PRN
Status: DISCONTINUED | OUTPATIENT
Start: 2023-01-24 | End: 2023-01-28 | Stop reason: HOSPADM

## 2023-01-24 RX ORDER — LABETALOL HYDROCHLORIDE 5 MG/ML
5 INJECTION, SOLUTION INTRAVENOUS
Status: CANCELLED | OUTPATIENT
Start: 2023-01-24

## 2023-01-24 RX ORDER — NALOXONE HCL 0.4 MG/ML
0.4 VIAL (ML) INJECTION
Status: DISCONTINUED | OUTPATIENT
Start: 2023-01-24 | End: 2023-01-28 | Stop reason: HOSPADM

## 2023-01-24 RX ORDER — ENOXAPARIN SODIUM 100 MG/ML
40 INJECTION SUBCUTANEOUS DAILY
Status: DISCONTINUED | OUTPATIENT
Start: 2023-01-25 | End: 2023-01-28 | Stop reason: HOSPADM

## 2023-01-24 RX ORDER — KETAMINE HCL IN NACL, ISO-OSM 100MG/10ML
SYRINGE (ML) INJECTION AS NEEDED
Status: DISCONTINUED | OUTPATIENT
Start: 2023-01-24 | End: 2023-01-24 | Stop reason: SURG

## 2023-01-24 RX ORDER — LIDOCAINE HYDROCHLORIDE 20 MG/ML
INJECTION, SOLUTION INFILTRATION; PERINEURAL AS NEEDED
Status: DISCONTINUED | OUTPATIENT
Start: 2023-01-24 | End: 2023-01-24 | Stop reason: SURG

## 2023-01-24 RX ORDER — HYDROMORPHONE HYDROCHLORIDE 1 MG/ML
0.5 INJECTION, SOLUTION INTRAMUSCULAR; INTRAVENOUS; SUBCUTANEOUS
Status: DISCONTINUED | OUTPATIENT
Start: 2023-01-24 | End: 2023-01-28 | Stop reason: HOSPADM

## 2023-01-24 RX ORDER — MAGNESIUM SULFATE HEPTAHYDRATE 500 MG/ML
INJECTION, SOLUTION INTRAMUSCULAR; INTRAVENOUS AS NEEDED
Status: DISCONTINUED | OUTPATIENT
Start: 2023-01-24 | End: 2023-01-24 | Stop reason: SURG

## 2023-01-24 RX ORDER — CEFAZOLIN SODIUM 2 G/100ML
2 INJECTION, SOLUTION INTRAVENOUS ONCE
Status: COMPLETED | OUTPATIENT
Start: 2023-01-24 | End: 2023-01-24

## 2023-01-24 RX ORDER — PROPOFOL 10 MG/ML
VIAL (ML) INTRAVENOUS AS NEEDED
Status: DISCONTINUED | OUTPATIENT
Start: 2023-01-24 | End: 2023-01-24 | Stop reason: SURG

## 2023-01-24 RX ORDER — SODIUM CHLORIDE 0.9 % (FLUSH) 0.9 %
10 SYRINGE (ML) INJECTION EVERY 12 HOURS SCHEDULED
Status: DISCONTINUED | OUTPATIENT
Start: 2023-01-24 | End: 2023-01-24 | Stop reason: HOSPADM

## 2023-01-24 RX ORDER — FLUMAZENIL 0.1 MG/ML
0.2 INJECTION INTRAVENOUS AS NEEDED
Status: DISCONTINUED | OUTPATIENT
Start: 2023-01-24 | End: 2023-01-24 | Stop reason: HOSPADM

## 2023-01-24 RX ORDER — DIPHENHYDRAMINE HYDROCHLORIDE 50 MG/ML
25 INJECTION INTRAMUSCULAR; INTRAVENOUS NIGHTLY PRN
Status: DISCONTINUED | OUTPATIENT
Start: 2023-01-24 | End: 2023-01-28 | Stop reason: HOSPADM

## 2023-01-24 RX ORDER — HYDROCODONE BITARTRATE AND ACETAMINOPHEN 10; 325 MG/1; MG/1
1 TABLET ORAL EVERY 4 HOURS PRN
Status: DISCONTINUED | OUTPATIENT
Start: 2023-01-24 | End: 2023-01-28 | Stop reason: HOSPADM

## 2023-01-24 RX ORDER — DIPHENHYDRAMINE HCL 25 MG
25 CAPSULE ORAL
Status: DISCONTINUED | OUTPATIENT
Start: 2023-01-24 | End: 2023-01-24 | Stop reason: HOSPADM

## 2023-01-24 RX ORDER — FENTANYL CITRATE 50 UG/ML
INJECTION, SOLUTION INTRAMUSCULAR; INTRAVENOUS AS NEEDED
Status: DISCONTINUED | OUTPATIENT
Start: 2023-01-24 | End: 2023-01-24 | Stop reason: SURG

## 2023-01-24 RX ORDER — NALOXONE HCL 0.4 MG/ML
0.1 VIAL (ML) INJECTION
Status: DISCONTINUED | OUTPATIENT
Start: 2023-01-24 | End: 2023-01-28 | Stop reason: HOSPADM

## 2023-01-24 RX ORDER — PROMETHAZINE HYDROCHLORIDE 25 MG/1
25 SUPPOSITORY RECTAL ONCE AS NEEDED
Status: DISCONTINUED | OUTPATIENT
Start: 2023-01-24 | End: 2023-01-24 | Stop reason: HOSPADM

## 2023-01-24 RX ORDER — HYDROCODONE BITARTRATE AND ACETAMINOPHEN 5; 325 MG/1; MG/1
1 TABLET ORAL EVERY 6 HOURS PRN
Qty: 30 TABLET | Refills: 0 | Status: SHIPPED | OUTPATIENT
Start: 2023-01-24 | End: 2023-02-06 | Stop reason: SDDI

## 2023-01-24 RX ORDER — ONDANSETRON 4 MG/1
4 TABLET, FILM COATED ORAL EVERY 6 HOURS PRN
Status: DISCONTINUED | OUTPATIENT
Start: 2023-01-24 | End: 2023-01-28 | Stop reason: HOSPADM

## 2023-01-24 RX ORDER — KETOROLAC TROMETHAMINE 30 MG/ML
15 INJECTION, SOLUTION INTRAMUSCULAR; INTRAVENOUS ONCE AS NEEDED
Status: COMPLETED | OUTPATIENT
Start: 2023-01-24 | End: 2023-01-24

## 2023-01-24 RX ORDER — TOPIRAMATE 100 MG/1
100 TABLET, FILM COATED ORAL DAILY
Status: DISCONTINUED | OUTPATIENT
Start: 2023-01-24 | End: 2023-01-28 | Stop reason: HOSPADM

## 2023-01-24 RX ORDER — FAMOTIDINE 20 MG/1
20 TABLET, FILM COATED ORAL DAILY
Status: DISCONTINUED | OUTPATIENT
Start: 2023-01-24 | End: 2023-01-28 | Stop reason: HOSPADM

## 2023-01-24 RX ORDER — NALOXONE HCL 0.4 MG/ML
0.2 VIAL (ML) INJECTION AS NEEDED
Status: CANCELLED | OUTPATIENT
Start: 2023-01-24

## 2023-01-24 RX ORDER — DIPHENHYDRAMINE HCL 25 MG
25 CAPSULE ORAL NIGHTLY PRN
Status: DISCONTINUED | OUTPATIENT
Start: 2023-01-24 | End: 2023-01-28 | Stop reason: HOSPADM

## 2023-01-24 RX ORDER — DIPHENHYDRAMINE HCL 25 MG
25 CAPSULE ORAL
Status: CANCELLED | OUTPATIENT
Start: 2023-01-24

## 2023-01-24 RX ORDER — DIPHENHYDRAMINE HYDROCHLORIDE 50 MG/ML
12.5 INJECTION INTRAMUSCULAR; INTRAVENOUS
Status: DISCONTINUED | OUTPATIENT
Start: 2023-01-24 | End: 2023-01-24 | Stop reason: HOSPADM

## 2023-01-24 RX ORDER — PRAMIPEXOLE DIHYDROCHLORIDE 0.25 MG/1
0.25 TABLET ORAL 2 TIMES DAILY
Status: DISCONTINUED | OUTPATIENT
Start: 2023-01-24 | End: 2023-01-28 | Stop reason: HOSPADM

## 2023-01-24 RX ORDER — LIDOCAINE HYDROCHLORIDE 10 MG/ML
0.5 INJECTION, SOLUTION EPIDURAL; INFILTRATION; INTRACAUDAL; PERINEURAL ONCE AS NEEDED
Status: DISCONTINUED | OUTPATIENT
Start: 2023-01-24 | End: 2023-01-24 | Stop reason: HOSPADM

## 2023-01-24 RX ORDER — CLONAZEPAM 0.5 MG/1
0.5 TABLET ORAL 2 TIMES DAILY PRN
Status: DISCONTINUED | OUTPATIENT
Start: 2023-01-24 | End: 2023-01-28 | Stop reason: HOSPADM

## 2023-01-24 RX ORDER — HYDROMORPHONE HYDROCHLORIDE 1 MG/ML
0.5 INJECTION, SOLUTION INTRAMUSCULAR; INTRAVENOUS; SUBCUTANEOUS
Status: DISCONTINUED | OUTPATIENT
Start: 2023-01-24 | End: 2023-01-24 | Stop reason: HOSPADM

## 2023-01-24 RX ORDER — NALOXONE HCL 0.4 MG/ML
0.2 VIAL (ML) INJECTION AS NEEDED
Status: DISCONTINUED | OUTPATIENT
Start: 2023-01-24 | End: 2023-01-24 | Stop reason: HOSPADM

## 2023-01-24 RX ORDER — ONDANSETRON 2 MG/ML
4 INJECTION INTRAMUSCULAR; INTRAVENOUS ONCE AS NEEDED
Status: CANCELLED | OUTPATIENT
Start: 2023-01-24

## 2023-01-24 RX ORDER — HYDROMORPHONE HCL IN 0.9% NACL 10 MG/50ML
PATIENT CONTROLLED ANALGESIA SYRINGE INTRAVENOUS CONTINUOUS
Status: DISCONTINUED | OUTPATIENT
Start: 2023-01-24 | End: 2023-01-26

## 2023-01-24 RX ORDER — DIPHENHYDRAMINE HYDROCHLORIDE 50 MG/ML
12.5 INJECTION INTRAMUSCULAR; INTRAVENOUS
Status: CANCELLED | OUTPATIENT
Start: 2023-01-24

## 2023-01-24 RX ORDER — SUCCINYLCHOLINE/SOD CL,ISO/PF 200MG/10ML
SYRINGE (ML) INTRAVENOUS AS NEEDED
Status: DISCONTINUED | OUTPATIENT
Start: 2023-01-24 | End: 2023-01-24 | Stop reason: SURG

## 2023-01-24 RX ORDER — ONDANSETRON 2 MG/ML
4 INJECTION INTRAMUSCULAR; INTRAVENOUS ONCE AS NEEDED
Status: DISCONTINUED | OUTPATIENT
Start: 2023-01-24 | End: 2023-01-24 | Stop reason: HOSPADM

## 2023-01-24 RX ORDER — FENTANYL CITRATE 50 UG/ML
50 INJECTION, SOLUTION INTRAMUSCULAR; INTRAVENOUS
Status: DISCONTINUED | OUTPATIENT
Start: 2023-01-24 | End: 2023-01-24 | Stop reason: HOSPADM

## 2023-01-24 RX ORDER — MAGNESIUM HYDROXIDE 1200 MG/15ML
LIQUID ORAL AS NEEDED
Status: DISCONTINUED | OUTPATIENT
Start: 2023-01-24 | End: 2023-01-24 | Stop reason: HOSPADM

## 2023-01-24 RX ORDER — SODIUM CHLORIDE, SODIUM LACTATE, POTASSIUM CHLORIDE, CALCIUM CHLORIDE 600; 310; 30; 20 MG/100ML; MG/100ML; MG/100ML; MG/100ML
9 INJECTION, SOLUTION INTRAVENOUS CONTINUOUS PRN
Status: DISCONTINUED | OUTPATIENT
Start: 2023-01-24 | End: 2023-01-24 | Stop reason: HOSPADM

## 2023-01-24 RX ORDER — ROCURONIUM BROMIDE 10 MG/ML
INJECTION, SOLUTION INTRAVENOUS AS NEEDED
Status: DISCONTINUED | OUTPATIENT
Start: 2023-01-24 | End: 2023-01-24 | Stop reason: SURG

## 2023-01-24 RX ORDER — KETOROLAC TROMETHAMINE 15 MG/ML
15 INJECTION, SOLUTION INTRAMUSCULAR; INTRAVENOUS EVERY 8 HOURS
Status: COMPLETED | OUTPATIENT
Start: 2023-01-24 | End: 2023-01-25

## 2023-01-24 RX ORDER — EPHEDRINE SULFATE 50 MG/ML
5 INJECTION, SOLUTION INTRAVENOUS ONCE AS NEEDED
Status: CANCELLED | OUTPATIENT
Start: 2023-01-24

## 2023-01-24 RX ORDER — LABETALOL HYDROCHLORIDE 5 MG/ML
5 INJECTION, SOLUTION INTRAVENOUS
Status: DISCONTINUED | OUTPATIENT
Start: 2023-01-24 | End: 2023-01-24 | Stop reason: HOSPADM

## 2023-01-24 RX ORDER — FLUMAZENIL 0.1 MG/ML
0.2 INJECTION INTRAVENOUS AS NEEDED
Status: CANCELLED | OUTPATIENT
Start: 2023-01-24

## 2023-01-24 RX ORDER — PROMETHAZINE HYDROCHLORIDE 25 MG/1
25 TABLET ORAL ONCE AS NEEDED
Status: DISCONTINUED | OUTPATIENT
Start: 2023-01-24 | End: 2023-01-24 | Stop reason: HOSPADM

## 2023-01-24 RX ADMIN — Medication 200 MG: at 07:44

## 2023-01-24 RX ADMIN — ROCURONIUM BROMIDE 40 MG: 50 INJECTION, SOLUTION INTRAVENOUS at 07:55

## 2023-01-24 RX ADMIN — SIMETHICONE 120 MG: 80 TABLET, CHEWABLE ORAL at 21:45

## 2023-01-24 RX ADMIN — SODIUM CHLORIDE, POTASSIUM CHLORIDE, SODIUM LACTATE AND CALCIUM CHLORIDE: 600; 310; 30; 20 INJECTION, SOLUTION INTRAVENOUS at 11:16

## 2023-01-24 RX ADMIN — FENTANYL CITRATE 50 MCG: 50 INJECTION, SOLUTION INTRAMUSCULAR; INTRAVENOUS at 12:41

## 2023-01-24 RX ADMIN — SUGAMMADEX 200 MG: 100 INJECTION, SOLUTION INTRAVENOUS at 11:26

## 2023-01-24 RX ADMIN — KETOROLAC TROMETHAMINE 15 MG: 15 INJECTION, SOLUTION INTRAMUSCULAR; INTRAVENOUS at 21:45

## 2023-01-24 RX ADMIN — FENTANYL CITRATE 50 MCG: 50 INJECTION, SOLUTION INTRAMUSCULAR; INTRAVENOUS at 11:26

## 2023-01-24 RX ADMIN — PROPOFOL 200 MG: 10 INJECTION, EMULSION INTRAVENOUS at 07:44

## 2023-01-24 RX ADMIN — HYDROMORPHONE HYDROCHLORIDE: 10 INJECTION, SOLUTION INTRAMUSCULAR; INTRAVENOUS; SUBCUTANEOUS at 12:13

## 2023-01-24 RX ADMIN — FAMOTIDINE 20 MG: 20 TABLET, FILM COATED ORAL at 19:08

## 2023-01-24 RX ADMIN — SIMETHICONE 120 MG: 80 TABLET, CHEWABLE ORAL at 19:08

## 2023-01-24 RX ADMIN — ROCURONIUM BROMIDE 10 MG: 50 INJECTION, SOLUTION INTRAVENOUS at 10:00

## 2023-01-24 RX ADMIN — BUPIVACAINE HYDROCHLORIDE 20 ML: 2.5 INJECTION, SOLUTION EPIDURAL; INFILTRATION; INTRACAUDAL; PERINEURAL at 08:00

## 2023-01-24 RX ADMIN — MAGNESIUM SULFATE HEPTAHYDRATE 2 G: 500 INJECTION, SOLUTION INTRAMUSCULAR; INTRAVENOUS at 07:52

## 2023-01-24 RX ADMIN — KETOROLAC TROMETHAMINE 15 MG: 30 INJECTION, SOLUTION INTRAMUSCULAR at 13:28

## 2023-01-24 RX ADMIN — DEXAMETHASONE SODIUM PHOSPHATE 8 MG: 4 INJECTION, SOLUTION INTRAMUSCULAR; INTRAVENOUS at 07:51

## 2023-01-24 RX ADMIN — HYDROMORPHONE HYDROCHLORIDE 0.5 MG: 1 INJECTION, SOLUTION INTRAMUSCULAR; INTRAVENOUS; SUBCUTANEOUS at 09:15

## 2023-01-24 RX ADMIN — Medication 30 MG: at 07:57

## 2023-01-24 RX ADMIN — SODIUM CHLORIDE, POTASSIUM CHLORIDE, SODIUM LACTATE AND CALCIUM CHLORIDE 100 ML/HR: 600; 310; 30; 20 INJECTION, SOLUTION INTRAVENOUS at 07:27

## 2023-01-24 RX ADMIN — SODIUM CHLORIDE 100 ML/HR: 9 INJECTION, SOLUTION INTRAVENOUS at 17:03

## 2023-01-24 RX ADMIN — CEFAZOLIN SODIUM 2 G: 2 INJECTION, SOLUTION INTRAVENOUS at 07:27

## 2023-01-24 RX ADMIN — Medication 10 MG: at 09:55

## 2023-01-24 RX ADMIN — FENTANYL CITRATE 50 MCG: 50 INJECTION, SOLUTION INTRAMUSCULAR; INTRAVENOUS at 12:11

## 2023-01-24 RX ADMIN — Medication 10 MG: at 08:58

## 2023-01-24 RX ADMIN — ROCURONIUM BROMIDE 10 MG: 50 INJECTION, SOLUTION INTRAVENOUS at 09:03

## 2023-01-24 RX ADMIN — ONDANSETRON 4 MG: 2 INJECTION INTRAMUSCULAR; INTRAVENOUS at 11:17

## 2023-01-24 RX ADMIN — ROCURONIUM BROMIDE 10 MG: 50 INJECTION, SOLUTION INTRAVENOUS at 07:44

## 2023-01-24 RX ADMIN — FENTANYL CITRATE 50 MCG: 50 INJECTION, SOLUTION INTRAMUSCULAR; INTRAVENOUS at 13:28

## 2023-01-24 RX ADMIN — HYDROMORPHONE HYDROCHLORIDE 0.5 MG: 1 INJECTION, SOLUTION INTRAMUSCULAR; INTRAVENOUS; SUBCUTANEOUS at 12:51

## 2023-01-24 RX ADMIN — CEFAZOLIN SODIUM 3 G: 2 INJECTION, SOLUTION INTRAVENOUS at 11:25

## 2023-01-24 RX ADMIN — HYDROMORPHONE HYDROCHLORIDE 0.5 MG: 1 INJECTION, SOLUTION INTRAMUSCULAR; INTRAVENOUS; SUBCUTANEOUS at 12:21

## 2023-01-24 RX ADMIN — PRAMIPEXOLE DIHYDROCHLORIDE 0.25 MG: 0.25 TABLET ORAL at 23:27

## 2023-01-24 RX ADMIN — LIDOCAINE HYDROCHLORIDE 100 MG: 20 INJECTION, SOLUTION INFILTRATION; PERINEURAL at 07:44

## 2023-01-24 RX ADMIN — BUPIVACAINE 20 ML: 13.3 INJECTION, SUSPENSION, LIPOSOMAL INFILTRATION at 08:00

## 2023-01-24 NOTE — ANESTHESIA PROCEDURE NOTES
Peripheral Block      Patient reassessed immediately prior to procedure    Patient location during procedure: OR  Start time: 1/24/2023 8:00 AM  Stop time: 1/24/2023 8:05 AM  Reason for block: at surgeon's request and post-op pain management  Performed by  Anesthesiologist: Dennis Rhodes MD  Preanesthetic Checklist  Completed: patient identified, IV checked, site marked, risks and benefits discussed, surgical consent, monitors and equipment checked, pre-op evaluation and timeout performed  Prep:  Pt Position: supine  Sterile barriers:cap, gloves and washed/disinfected hands  Prep: ChloraPrep  Patient monitoring: blood pressure monitoring, continuous pulse oximetry and EKG  Procedure    Guidance:ultrasound guided    ULTRASOUND INTERPRETATION.  Using ultrasound guidance a 21 G gauge needle was placed in close proximity to the nerve, at which point, under ultrasound guidance anesthetic was injected in the area of the nerve and spread of the anesthesia was seen on ultrasound in close proximity thereto.  There were no abnormalities seen on ultrasound; a digital image was taken; and the patient tolerated the procedure with no complications. Images:still images obtained, printed/placed on chart    Laterality:Bilateral  Block Type:rectus sheath  Injection Technique:single-shot  Needle Type:echogenic  Resistance on Injection: less than 15 psi    Medications Used: bupivacaine liposome (EXPAREL) 1.3 % injection - Infiltration   20 mL - 1/24/2023 8:00:00 AM  bupivacaine PF (MARCAINE) 0.25 % injection - Injection   20 mL - 1/24/2023 8:00:00 AM      Medications  Comment:Bupivacaine 0.25% 10cc with 10cc 1.3% exparel injected bilateral rectus sheath    Post Assessment  Injection Assessment: negative aspiration for heme, no paresthesia on injection and incremental injection  Patient Tolerance:comfortable throughout block  Complications:no

## 2023-01-24 NOTE — ANESTHESIA PROCEDURE NOTES
Airway  Urgency: elective    Date/Time: 1/24/2023 7:48 AM  Airway not difficult    General Information and Staff    Patient location during procedure: OR  Anesthesiologist: Dennis Rhodes MD  CRNA/CAA: Lakshmi Davis CRNA    Indications and Patient Condition  Indications for airway management: airway protection    Preoxygenated: yes  MILS not maintained throughout  Mask difficulty assessment: 1 - vent by mask    Final Airway Details  Final airway type: endotracheal airway      Successful airway: ETT  Cuffed: yes   Successful intubation technique: direct laryngoscopy  Facilitating devices/methods: Bougie  Endotracheal tube insertion site: oral  Blade: Elif  Blade size: 3  ETT size (mm): 7.0  Cormack-Lehane Classification: grade III - view of epiglottis only  Placement verified by: chest auscultation and capnometry   Cuff volume (mL): 7  Measured from: lips  ETT/EBT  to lips (cm): 21  Number of attempts at approach: 2  Assessment: lips, teeth, and gum same as pre-op and atraumatic intubation    Additional Comments  Pt preoxygenated prior to induction, easy mask airway, DL x 1 with Mac 3, grade III, DL x 1 with Mac 3/bougie, atraumatic intubation,+ ETCO2, + bs bilat,  ETT secured and connected to ventilator.

## 2023-01-24 NOTE — ANESTHESIA POSTPROCEDURE EVALUATION
"Patient: Bowen Concepcion    Procedure Summary     Date: 01/24/23 Room / Location: [facility] OR  / [facility] MAIN OR    Anesthesia Start: 0733 Anesthesia Stop: 1141    Procedure: EXPLORATORY LAPAROTOMY, TOTAL ABDOMINAL HYSTERECTOMY, BILATERAL SALPINGO OOPHORECTOMY WITH STAGING (Abdomen) Diagnosis:       Ovarian mass, left      (Ovarian mass, left [N83.8])    Surgeons: Abena Abdalla DO Provider: Dennis Rhodes MD    Anesthesia Type: general ASA Status: 3          Anesthesia Type: general    Vitals  Vitals Value Taken Time   /91 01/24/23 1231   Temp 36.3 °C (97.4 °F) 01/24/23 1134   Pulse 107 01/24/23 1241   Resp 20 01/24/23 1215   SpO2 98 % 01/24/23 1241   Vitals shown include unvalidated device data.        Post Anesthesia Care and Evaluation    Patient location during evaluation: PACU  Patient participation: complete - patient participated  Level of consciousness: awake and alert  Pain management: adequate    Airway patency: patent  Anesthetic complications: No anesthetic complications  PONV Status: controlled  Cardiovascular status: acceptable  Respiratory status: acceptable  Hydration status: acceptable    Comments: /87   Pulse 110   Temp 36.3 °C (97.4 °F) (Oral)   Resp 20   Ht 157.5 cm (62.01\")   Wt 105 kg (231 lb 7.7 oz)   SpO2 99%   BMI 42.33 kg/m²         "

## 2023-01-24 NOTE — ANESTHESIA PREPROCEDURE EVALUATION
" Anesthesia Evaluation     Patient summary reviewed and Nursing notes reviewed   NPO Solid Status: > 8 hours  NPO Liquid Status: > 2 hours           Airway   Mallampati: III  TM distance: >3 FB  Neck ROM: full  Difficult intubation highly probable and Small opening  Dental - normal exam     Pulmonary - normal exam   (+) sleep apnea on CPAP,   Cardiovascular - normal exam  Exercise tolerance: good (4-7 METS)    ECG reviewed  Patient on routine beta blocker and Beta blocker given within 24 hours of surgery    (+) valvular problems/murmurs, dysrhythmias Atrial Fib, hyperlipidemia,       Neuro/Psych  (+) headaches, psychiatric history Anxiety and Depression,    GI/Hepatic/Renal/Endo    (+) morbid obesity, GERD,  thyroid problem hypothyroidism    Musculoskeletal     Abdominal    Substance History      OB/GYN          Other   arthritis,          Phys Exam Other: Small mouth opening, states \"scraped roof of mouth/injured roof of mouth\" before with previous intubations                Anesthesia Plan    ASA 3     general     intravenous induction     Anesthetic plan, risks, benefits, and alternatives have been provided, discussed and informed consent has been obtained with: patient.    Plan discussed with CRNA.        CODE STATUS:       "

## 2023-01-25 LAB
ANION GAP SERPL CALCULATED.3IONS-SCNC: 6.2 MMOL/L (ref 5–15)
BASOPHILS # BLD AUTO: 0.02 10*3/MM3 (ref 0–0.2)
BASOPHILS NFR BLD AUTO: 0.2 % (ref 0–1.5)
BUN SERPL-MCNC: 11 MG/DL (ref 8–23)
BUN/CREAT SERPL: 19.6 (ref 7–25)
CALCIUM SPEC-SCNC: 8 MG/DL (ref 8.6–10.5)
CHLORIDE SERPL-SCNC: 112 MMOL/L (ref 98–107)
CO2 SERPL-SCNC: 23.8 MMOL/L (ref 22–29)
CREAT SERPL-MCNC: 0.56 MG/DL (ref 0.57–1)
CYTO UR: NORMAL
DEPRECATED RDW RBC AUTO: 40.9 FL (ref 37–54)
EGFRCR SERPLBLD CKD-EPI 2021: 99.6 ML/MIN/1.73
EOSINOPHIL # BLD AUTO: 0 10*3/MM3 (ref 0–0.4)
EOSINOPHIL NFR BLD AUTO: 0 % (ref 0.3–6.2)
ERYTHROCYTE [DISTWIDTH] IN BLOOD BY AUTOMATED COUNT: 13.8 % (ref 12.3–15.4)
GLUCOSE SERPL-MCNC: 111 MG/DL (ref 65–99)
HCT VFR BLD AUTO: 31.5 % (ref 34–46.6)
HGB BLD-MCNC: 10.5 G/DL (ref 12–15.9)
IMM GRANULOCYTES # BLD AUTO: 0.04 10*3/MM3 (ref 0–0.05)
IMM GRANULOCYTES NFR BLD AUTO: 0.4 % (ref 0–0.5)
LAB AP CASE REPORT: NORMAL
LYMPHOCYTES # BLD AUTO: 1.82 10*3/MM3 (ref 0.7–3.1)
LYMPHOCYTES NFR BLD AUTO: 18.4 % (ref 19.6–45.3)
MCH RBC QN AUTO: 27.4 PG (ref 26.6–33)
MCHC RBC AUTO-ENTMCNC: 33.3 G/DL (ref 31.5–35.7)
MCV RBC AUTO: 82.2 FL (ref 79–97)
MONOCYTES # BLD AUTO: 0.94 10*3/MM3 (ref 0.1–0.9)
MONOCYTES NFR BLD AUTO: 9.5 % (ref 5–12)
NEUTROPHILS NFR BLD AUTO: 7.05 10*3/MM3 (ref 1.7–7)
NEUTROPHILS NFR BLD AUTO: 71.5 % (ref 42.7–76)
NRBC BLD AUTO-RTO: 0 /100 WBC (ref 0–0.2)
PATH REPORT.FINAL DX SPEC: NORMAL
PATH REPORT.GROSS SPEC: NORMAL
PLATELET # BLD AUTO: 194 10*3/MM3 (ref 140–450)
PMV BLD AUTO: 9.6 FL (ref 6–12)
POTASSIUM SERPL-SCNC: 3.8 MMOL/L (ref 3.5–5.2)
RBC # BLD AUTO: 3.83 10*6/MM3 (ref 3.77–5.28)
SODIUM SERPL-SCNC: 142 MMOL/L (ref 136–145)
WBC NRBC COR # BLD: 9.87 10*3/MM3 (ref 3.4–10.8)

## 2023-01-25 PROCEDURE — 85025 COMPLETE CBC W/AUTO DIFF WBC: CPT | Performed by: OBSTETRICS & GYNECOLOGY

## 2023-01-25 PROCEDURE — 97110 THERAPEUTIC EXERCISES: CPT

## 2023-01-25 PROCEDURE — 97162 PT EVAL MOD COMPLEX 30 MIN: CPT

## 2023-01-25 PROCEDURE — 25010000002 ENOXAPARIN PER 10 MG: Performed by: OBSTETRICS & GYNECOLOGY

## 2023-01-25 PROCEDURE — 80048 BASIC METABOLIC PNL TOTAL CA: CPT | Performed by: OBSTETRICS & GYNECOLOGY

## 2023-01-25 PROCEDURE — 25010000002 KETOROLAC TROMETHAMINE PER 15 MG: Performed by: OBSTETRICS & GYNECOLOGY

## 2023-01-25 RX ADMIN — PRAMIPEXOLE DIHYDROCHLORIDE 0.25 MG: 0.25 TABLET ORAL at 21:24

## 2023-01-25 RX ADMIN — SODIUM CHLORIDE 100 ML/HR: 9 INJECTION, SOLUTION INTRAVENOUS at 02:31

## 2023-01-25 RX ADMIN — SIMETHICONE 120 MG: 80 TABLET, CHEWABLE ORAL at 18:37

## 2023-01-25 RX ADMIN — KETOROLAC TROMETHAMINE 15 MG: 15 INJECTION, SOLUTION INTRAMUSCULAR; INTRAVENOUS at 05:55

## 2023-01-25 RX ADMIN — PRAMIPEXOLE DIHYDROCHLORIDE 0.25 MG: 0.25 TABLET ORAL at 10:24

## 2023-01-25 RX ADMIN — SIMETHICONE 120 MG: 80 TABLET, CHEWABLE ORAL at 11:00

## 2023-01-25 RX ADMIN — SIMETHICONE 120 MG: 80 TABLET, CHEWABLE ORAL at 05:55

## 2023-01-25 RX ADMIN — ENOXAPARIN SODIUM 40 MG: 100 INJECTION SUBCUTANEOUS at 10:24

## 2023-01-25 RX ADMIN — FAMOTIDINE 20 MG: 20 TABLET, FILM COATED ORAL at 10:24

## 2023-01-25 RX ADMIN — KETOROLAC TROMETHAMINE 15 MG: 15 INJECTION, SOLUTION INTRAMUSCULAR; INTRAVENOUS at 13:52

## 2023-01-25 RX ADMIN — METOPROLOL SUCCINATE 100 MG: 100 TABLET, EXTENDED RELEASE ORAL at 10:24

## 2023-01-25 RX ADMIN — SIMETHICONE 120 MG: 80 TABLET, CHEWABLE ORAL at 21:24

## 2023-01-26 LAB
ANION GAP SERPL CALCULATED.3IONS-SCNC: 6 MMOL/L (ref 5–15)
BASOPHILS # BLD AUTO: 0.06 10*3/MM3 (ref 0–0.2)
BASOPHILS NFR BLD AUTO: 0.8 % (ref 0–1.5)
BUN SERPL-MCNC: 14 MG/DL (ref 8–23)
BUN/CREAT SERPL: 25 (ref 7–25)
CALCIUM SPEC-SCNC: 7.6 MG/DL (ref 8.6–10.5)
CHLORIDE SERPL-SCNC: 114 MMOL/L (ref 98–107)
CO2 SERPL-SCNC: 22 MMOL/L (ref 22–29)
CREAT SERPL-MCNC: 0.56 MG/DL (ref 0.57–1)
DEPRECATED RDW RBC AUTO: 41.5 FL (ref 37–54)
EGFRCR SERPLBLD CKD-EPI 2021: 99.6 ML/MIN/1.73
EOSINOPHIL # BLD AUTO: 0.13 10*3/MM3 (ref 0–0.4)
EOSINOPHIL NFR BLD AUTO: 1.6 % (ref 0.3–6.2)
ERYTHROCYTE [DISTWIDTH] IN BLOOD BY AUTOMATED COUNT: 13.6 % (ref 12.3–15.4)
GLUCOSE SERPL-MCNC: 89 MG/DL (ref 65–99)
HCT VFR BLD AUTO: 31.2 % (ref 34–46.6)
HGB BLD-MCNC: 9.9 G/DL (ref 12–15.9)
IMM GRANULOCYTES # BLD AUTO: 0.02 10*3/MM3 (ref 0–0.05)
IMM GRANULOCYTES NFR BLD AUTO: 0.3 % (ref 0–0.5)
LYMPHOCYTES # BLD AUTO: 2.68 10*3/MM3 (ref 0.7–3.1)
LYMPHOCYTES NFR BLD AUTO: 33.9 % (ref 19.6–45.3)
MCH RBC QN AUTO: 26.7 PG (ref 26.6–33)
MCHC RBC AUTO-ENTMCNC: 31.7 G/DL (ref 31.5–35.7)
MCV RBC AUTO: 84.1 FL (ref 79–97)
MONOCYTES # BLD AUTO: 0.81 10*3/MM3 (ref 0.1–0.9)
MONOCYTES NFR BLD AUTO: 10.3 % (ref 5–12)
NEUTROPHILS NFR BLD AUTO: 4.2 10*3/MM3 (ref 1.7–7)
NEUTROPHILS NFR BLD AUTO: 53.1 % (ref 42.7–76)
NRBC BLD AUTO-RTO: 0 /100 WBC (ref 0–0.2)
PLATELET # BLD AUTO: 189 10*3/MM3 (ref 140–450)
PMV BLD AUTO: 9.8 FL (ref 6–12)
POTASSIUM SERPL-SCNC: 3.7 MMOL/L (ref 3.5–5.2)
RBC # BLD AUTO: 3.71 10*6/MM3 (ref 3.77–5.28)
SODIUM SERPL-SCNC: 142 MMOL/L (ref 136–145)
WBC NRBC COR # BLD: 7.9 10*3/MM3 (ref 3.4–10.8)

## 2023-01-26 PROCEDURE — 97530 THERAPEUTIC ACTIVITIES: CPT

## 2023-01-26 PROCEDURE — 25010000002 ENOXAPARIN PER 10 MG: Performed by: OBSTETRICS & GYNECOLOGY

## 2023-01-26 PROCEDURE — 80048 BASIC METABOLIC PNL TOTAL CA: CPT | Performed by: OBSTETRICS & GYNECOLOGY

## 2023-01-26 PROCEDURE — 85025 COMPLETE CBC W/AUTO DIFF WBC: CPT | Performed by: OBSTETRICS & GYNECOLOGY

## 2023-01-26 RX ADMIN — SIMETHICONE 120 MG: 80 TABLET, CHEWABLE ORAL at 06:58

## 2023-01-26 RX ADMIN — SIMETHICONE 120 MG: 80 TABLET, CHEWABLE ORAL at 17:25

## 2023-01-26 RX ADMIN — FAMOTIDINE 20 MG: 20 TABLET, FILM COATED ORAL at 09:02

## 2023-01-26 RX ADMIN — SIMETHICONE 120 MG: 80 TABLET, CHEWABLE ORAL at 20:46

## 2023-01-26 RX ADMIN — HYDROCODONE BITARTRATE AND ACETAMINOPHEN 1 TABLET: 5; 325 TABLET ORAL at 17:24

## 2023-01-26 RX ADMIN — DOCUSATE SODIUM 100 MG: 100 CAPSULE, LIQUID FILLED ORAL at 17:26

## 2023-01-26 RX ADMIN — HYDROCODONE BITARTRATE AND ACETAMINOPHEN 1 TABLET: 5; 325 TABLET ORAL at 21:35

## 2023-01-26 RX ADMIN — ENOXAPARIN SODIUM 40 MG: 100 INJECTION SUBCUTANEOUS at 09:02

## 2023-01-26 RX ADMIN — SIMETHICONE 120 MG: 80 TABLET, CHEWABLE ORAL at 13:02

## 2023-01-26 RX ADMIN — HYDROCODONE BITARTRATE AND ACETAMINOPHEN 1 TABLET: 5; 325 TABLET ORAL at 13:02

## 2023-01-26 RX ADMIN — PRAMIPEXOLE DIHYDROCHLORIDE 0.25 MG: 0.25 TABLET ORAL at 20:46

## 2023-01-26 RX ADMIN — METOPROLOL SUCCINATE 100 MG: 100 TABLET, EXTENDED RELEASE ORAL at 09:02

## 2023-01-26 RX ADMIN — PRAMIPEXOLE DIHYDROCHLORIDE 0.25 MG: 0.25 TABLET ORAL at 09:02

## 2023-01-27 LAB
ANION GAP SERPL CALCULATED.3IONS-SCNC: 6.5 MMOL/L (ref 5–15)
BASOPHILS # BLD AUTO: 0.03 10*3/MM3 (ref 0–0.2)
BASOPHILS NFR BLD AUTO: 0.5 % (ref 0–1.5)
BUN SERPL-MCNC: 10 MG/DL (ref 8–23)
BUN/CREAT SERPL: 22.2 (ref 7–25)
CALCIUM SPEC-SCNC: 8.2 MG/DL (ref 8.6–10.5)
CHLORIDE SERPL-SCNC: 111 MMOL/L (ref 98–107)
CO2 SERPL-SCNC: 24.5 MMOL/L (ref 22–29)
CREAT SERPL-MCNC: 0.45 MG/DL (ref 0.57–1)
DEPRECATED RDW RBC AUTO: 40.3 FL (ref 37–54)
EGFRCR SERPLBLD CKD-EPI 2021: 104.9 ML/MIN/1.73
EOSINOPHIL # BLD AUTO: 0.21 10*3/MM3 (ref 0–0.4)
EOSINOPHIL NFR BLD AUTO: 3.5 % (ref 0.3–6.2)
ERYTHROCYTE [DISTWIDTH] IN BLOOD BY AUTOMATED COUNT: 13.2 % (ref 12.3–15.4)
GLUCOSE SERPL-MCNC: 87 MG/DL (ref 65–99)
HCT VFR BLD AUTO: 30.2 % (ref 34–46.6)
HGB BLD-MCNC: 10 G/DL (ref 12–15.9)
IMM GRANULOCYTES # BLD AUTO: 0.01 10*3/MM3 (ref 0–0.05)
IMM GRANULOCYTES NFR BLD AUTO: 0.2 % (ref 0–0.5)
LYMPHOCYTES # BLD AUTO: 2.33 10*3/MM3 (ref 0.7–3.1)
LYMPHOCYTES NFR BLD AUTO: 38.6 % (ref 19.6–45.3)
MCH RBC QN AUTO: 27.5 PG (ref 26.6–33)
MCHC RBC AUTO-ENTMCNC: 33.1 G/DL (ref 31.5–35.7)
MCV RBC AUTO: 83 FL (ref 79–97)
MONOCYTES # BLD AUTO: 0.57 10*3/MM3 (ref 0.1–0.9)
MONOCYTES NFR BLD AUTO: 9.4 % (ref 5–12)
NEUTROPHILS NFR BLD AUTO: 2.89 10*3/MM3 (ref 1.7–7)
NEUTROPHILS NFR BLD AUTO: 47.8 % (ref 42.7–76)
NRBC BLD AUTO-RTO: 0 /100 WBC (ref 0–0.2)
PLATELET # BLD AUTO: 178 10*3/MM3 (ref 140–450)
PMV BLD AUTO: 10 FL (ref 6–12)
POTASSIUM SERPL-SCNC: 3.6 MMOL/L (ref 3.5–5.2)
RBC # BLD AUTO: 3.64 10*6/MM3 (ref 3.77–5.28)
SODIUM SERPL-SCNC: 142 MMOL/L (ref 136–145)
WBC NRBC COR # BLD: 6.04 10*3/MM3 (ref 3.4–10.8)

## 2023-01-27 PROCEDURE — 25010000002 ENOXAPARIN PER 10 MG: Performed by: OBSTETRICS & GYNECOLOGY

## 2023-01-27 PROCEDURE — 85025 COMPLETE CBC W/AUTO DIFF WBC: CPT | Performed by: OBSTETRICS & GYNECOLOGY

## 2023-01-27 PROCEDURE — 80048 BASIC METABOLIC PNL TOTAL CA: CPT | Performed by: OBSTETRICS & GYNECOLOGY

## 2023-01-27 RX ADMIN — PRAMIPEXOLE DIHYDROCHLORIDE 0.25 MG: 0.25 TABLET ORAL at 20:44

## 2023-01-27 RX ADMIN — HYDROCODONE BITARTRATE AND ACETAMINOPHEN 1 TABLET: 5; 325 TABLET ORAL at 06:39

## 2023-01-27 RX ADMIN — HYDROCODONE BITARTRATE AND ACETAMINOPHEN 1 TABLET: 5; 325 TABLET ORAL at 10:37

## 2023-01-27 RX ADMIN — SIMETHICONE 120 MG: 80 TABLET, CHEWABLE ORAL at 20:44

## 2023-01-27 RX ADMIN — MAGNESIUM HYDROXIDE 10 ML: 2400 SUSPENSION ORAL at 08:24

## 2023-01-27 RX ADMIN — PRAMIPEXOLE DIHYDROCHLORIDE 0.25 MG: 0.25 TABLET ORAL at 08:24

## 2023-01-27 RX ADMIN — SIMETHICONE 120 MG: 80 TABLET, CHEWABLE ORAL at 10:37

## 2023-01-27 RX ADMIN — HYDROCODONE BITARTRATE AND ACETAMINOPHEN 1 TABLET: 5; 325 TABLET ORAL at 22:37

## 2023-01-27 RX ADMIN — METOPROLOL SUCCINATE 100 MG: 100 TABLET, EXTENDED RELEASE ORAL at 08:24

## 2023-01-27 RX ADMIN — FAMOTIDINE 20 MG: 20 TABLET, FILM COATED ORAL at 08:24

## 2023-01-27 RX ADMIN — ENOXAPARIN SODIUM 40 MG: 100 INJECTION SUBCUTANEOUS at 08:24

## 2023-01-27 RX ADMIN — DOCUSATE SODIUM 100 MG: 100 CAPSULE, LIQUID FILLED ORAL at 22:37

## 2023-01-27 RX ADMIN — HYDROCODONE BITARTRATE AND ACETAMINOPHEN 1 TABLET: 5; 325 TABLET ORAL at 18:47

## 2023-01-27 RX ADMIN — SIMETHICONE 120 MG: 80 TABLET, CHEWABLE ORAL at 18:47

## 2023-01-27 RX ADMIN — HYDROCODONE BITARTRATE AND ACETAMINOPHEN 1 TABLET: 5; 325 TABLET ORAL at 14:43

## 2023-01-27 RX ADMIN — SIMETHICONE 120 MG: 80 TABLET, CHEWABLE ORAL at 06:39

## 2023-01-28 VITALS
BODY MASS INDEX: 42.6 KG/M2 | TEMPERATURE: 97.5 F | HEIGHT: 62 IN | WEIGHT: 231.48 LBS | DIASTOLIC BLOOD PRESSURE: 65 MMHG | SYSTOLIC BLOOD PRESSURE: 102 MMHG | RESPIRATION RATE: 18 BRPM | OXYGEN SATURATION: 98 % | HEART RATE: 85 BPM

## 2023-01-28 LAB
ANION GAP SERPL CALCULATED.3IONS-SCNC: 4.2 MMOL/L (ref 5–15)
BASOPHILS # BLD AUTO: 0.02 10*3/MM3 (ref 0–0.2)
BASOPHILS NFR BLD AUTO: 0.4 % (ref 0–1.5)
BUN SERPL-MCNC: 9 MG/DL (ref 8–23)
BUN/CREAT SERPL: 19.6 (ref 7–25)
CALCIUM SPEC-SCNC: 8.5 MG/DL (ref 8.6–10.5)
CHLORIDE SERPL-SCNC: 110 MMOL/L (ref 98–107)
CO2 SERPL-SCNC: 28.8 MMOL/L (ref 22–29)
CREAT SERPL-MCNC: 0.46 MG/DL (ref 0.57–1)
DEPRECATED RDW RBC AUTO: 42 FL (ref 37–54)
EGFRCR SERPLBLD CKD-EPI 2021: 104.4 ML/MIN/1.73
EOSINOPHIL # BLD AUTO: 0.22 10*3/MM3 (ref 0–0.4)
EOSINOPHIL NFR BLD AUTO: 4 % (ref 0.3–6.2)
ERYTHROCYTE [DISTWIDTH] IN BLOOD BY AUTOMATED COUNT: 13.5 % (ref 12.3–15.4)
GLUCOSE SERPL-MCNC: 86 MG/DL (ref 65–99)
HCT VFR BLD AUTO: 32.5 % (ref 34–46.6)
HGB BLD-MCNC: 10.4 G/DL (ref 12–15.9)
IMM GRANULOCYTES # BLD AUTO: 0.01 10*3/MM3 (ref 0–0.05)
IMM GRANULOCYTES NFR BLD AUTO: 0.2 % (ref 0–0.5)
LYMPHOCYTES # BLD AUTO: 1.76 10*3/MM3 (ref 0.7–3.1)
LYMPHOCYTES NFR BLD AUTO: 32.3 % (ref 19.6–45.3)
MCH RBC QN AUTO: 27.3 PG (ref 26.6–33)
MCHC RBC AUTO-ENTMCNC: 32 G/DL (ref 31.5–35.7)
MCV RBC AUTO: 85.3 FL (ref 79–97)
MONOCYTES # BLD AUTO: 0.48 10*3/MM3 (ref 0.1–0.9)
MONOCYTES NFR BLD AUTO: 8.8 % (ref 5–12)
NEUTROPHILS NFR BLD AUTO: 2.96 10*3/MM3 (ref 1.7–7)
NEUTROPHILS NFR BLD AUTO: 54.3 % (ref 42.7–76)
NRBC BLD AUTO-RTO: 0 /100 WBC (ref 0–0.2)
PLATELET # BLD AUTO: 200 10*3/MM3 (ref 140–450)
PMV BLD AUTO: 9.9 FL (ref 6–12)
POTASSIUM SERPL-SCNC: 4.1 MMOL/L (ref 3.5–5.2)
RBC # BLD AUTO: 3.81 10*6/MM3 (ref 3.77–5.28)
SODIUM SERPL-SCNC: 143 MMOL/L (ref 136–145)
WBC NRBC COR # BLD: 5.45 10*3/MM3 (ref 3.4–10.8)

## 2023-01-28 PROCEDURE — 85025 COMPLETE CBC W/AUTO DIFF WBC: CPT | Performed by: OBSTETRICS & GYNECOLOGY

## 2023-01-28 PROCEDURE — 25010000002 ENOXAPARIN PER 10 MG: Performed by: OBSTETRICS & GYNECOLOGY

## 2023-01-28 PROCEDURE — 80048 BASIC METABOLIC PNL TOTAL CA: CPT | Performed by: OBSTETRICS & GYNECOLOGY

## 2023-01-28 RX ORDER — HYDROCODONE BITARTRATE AND ACETAMINOPHEN 5; 325 MG/1; MG/1
1 TABLET ORAL EVERY 6 HOURS PRN
Qty: 20 TABLET | Refills: 0 | Status: SHIPPED | OUTPATIENT
Start: 2023-01-28 | End: 2023-02-06 | Stop reason: SDDI

## 2023-01-28 RX ADMIN — PRAMIPEXOLE DIHYDROCHLORIDE 0.25 MG: 0.25 TABLET ORAL at 11:01

## 2023-01-28 RX ADMIN — HYDROCODONE BITARTRATE AND ACETAMINOPHEN 1 TABLET: 5; 325 TABLET ORAL at 06:37

## 2023-01-28 RX ADMIN — DOCUSATE SODIUM 100 MG: 100 CAPSULE, LIQUID FILLED ORAL at 10:59

## 2023-01-28 RX ADMIN — ENOXAPARIN SODIUM 40 MG: 100 INJECTION SUBCUTANEOUS at 10:58

## 2023-01-28 RX ADMIN — METOPROLOL SUCCINATE 100 MG: 100 TABLET, EXTENDED RELEASE ORAL at 10:59

## 2023-01-28 RX ADMIN — FAMOTIDINE 20 MG: 20 TABLET, FILM COATED ORAL at 10:59

## 2023-01-28 RX ADMIN — SIMETHICONE 120 MG: 80 TABLET, CHEWABLE ORAL at 06:37

## 2023-01-28 RX ADMIN — MAGNESIUM HYDROXIDE 10 ML: 2400 SUSPENSION ORAL at 10:58

## 2023-02-01 ENCOUNTER — TELEPHONE (OUTPATIENT)
Dept: GYNECOLOGIC ONCOLOGY | Age: 68
End: 2023-02-01

## 2023-02-01 ENCOUNTER — TELEPHONE (OUTPATIENT)
Dept: GYNECOLOGIC ONCOLOGY | Facility: CLINIC | Age: 68
End: 2023-02-01
Payer: MEDICARE

## 2023-02-01 NOTE — TELEPHONE ENCOUNTER
Caller: Carlene Bowen A    Relationship: Self    Best call back number: [phone]    What is the best time to reach you: ANY    Who are you requesting to speak with (clinical staff, provider,  specific staff member): CLINICAL      What was the call regarding: BOWEN IS CALLING STATED THAT SHE HAD SURGERY ON 1-24 AND SHE NEVER GOT A POST OP VISIT,   ALSO SHE IS AT A REHAB CENTER AND WANTED TO KNOW IF IT WAS OK FOR THE NURSE THERE TO TAKE OUT THE STAPLES    Do you require a callback: YES

## 2023-02-04 ENCOUNTER — READMISSION MANAGEMENT (OUTPATIENT)
Dept: CALL CENTER | Facility: HOSPITAL | Age: 68
End: 2023-02-04
Payer: MEDICARE

## 2023-02-04 NOTE — OUTREACH NOTE
Prep Survey    Flowsheet Row Responses   [facility] facility patient discharged from? Non-BH   Is LACE score < 7 ? Non-BH Discharge   Eligibility [facility]    Date of Discharge 02/03/23   Discharge diagnosis Encounter for surgical aftercare following surgery on the genitourinary system   Does the patient have one of the following disease processes/diagnoses(primary or secondary)? General Surgery   Prep survey completed? Yes          TIEN PHILLIPS - Registered Nurse        
Anemia

## 2023-02-06 ENCOUNTER — OFFICE VISIT (OUTPATIENT)
Dept: GYNECOLOGIC ONCOLOGY | Facility: CLINIC | Age: 68
End: 2023-02-06
Payer: MEDICARE

## 2023-02-06 ENCOUNTER — TRANSITIONAL CARE MANAGEMENT TELEPHONE ENCOUNTER (OUTPATIENT)
Dept: CALL CENTER | Facility: HOSPITAL | Age: 68
End: 2023-02-06
Payer: MEDICARE

## 2023-02-06 VITALS
RESPIRATION RATE: 16 BRPM | HEART RATE: 96 BPM | SYSTOLIC BLOOD PRESSURE: 119 MMHG | TEMPERATURE: 98.5 F | DIASTOLIC BLOOD PRESSURE: 81 MMHG | OXYGEN SATURATION: 96 % | BODY MASS INDEX: 44.9 KG/M2 | WEIGHT: 244 LBS | HEIGHT: 62 IN

## 2023-02-06 DIAGNOSIS — C54.1 ENDOMETRIAL CARCINOMA: Primary | ICD-10-CM

## 2023-02-06 PROBLEM — Z86.39 H/O HYPERTHYROIDISM: Status: RESOLVED | Noted: 2019-03-14 | Resolved: 2023-02-06

## 2023-02-06 PROBLEM — E06.9 THYROIDITIS: Status: RESOLVED | Noted: 2019-03-14 | Resolved: 2023-02-06

## 2023-02-06 PROBLEM — R06.02 SHORTNESS OF BREATH: Status: RESOLVED | Noted: 2018-11-29 | Resolved: 2023-02-06

## 2023-02-06 PROBLEM — R11.2 NAUSEA & VOMITING: Status: RESOLVED | Noted: 2017-12-09 | Resolved: 2023-02-06

## 2023-02-06 PROBLEM — R63.0 ANOREXIA: Status: RESOLVED | Noted: 2018-02-11 | Resolved: 2023-02-06

## 2023-02-06 PROBLEM — N83.8 OVARIAN MASS: Status: RESOLVED | Noted: 2023-01-18 | Resolved: 2023-02-06

## 2023-02-06 PROBLEM — R63.4 ABNORMAL WEIGHT LOSS: Status: RESOLVED | Noted: 2017-12-09 | Resolved: 2023-02-06

## 2023-02-06 PROBLEM — N95.0 PMB (POSTMENOPAUSAL BLEEDING): Status: RESOLVED | Noted: 2022-12-21 | Resolved: 2023-02-06

## 2023-02-06 PROBLEM — R19.00 PELVIC MASS: Status: RESOLVED | Noted: 2023-01-10 | Resolved: 2023-02-06

## 2023-02-06 PROBLEM — Z12.11 SCREEN FOR COLON CANCER: Status: RESOLVED | Noted: 2022-02-17 | Resolved: 2023-02-06

## 2023-02-06 PROBLEM — M54.50 LOW BACK PAIN: Status: RESOLVED | Noted: 2017-11-09 | Resolved: 2023-02-06

## 2023-02-06 PROBLEM — E55.9 VITAMIN D DEFICIENCY: Status: RESOLVED | Noted: 2018-11-29 | Resolved: 2023-02-06

## 2023-02-06 PROBLEM — R63.5 ABNORMAL WEIGHT GAIN: Status: RESOLVED | Noted: 2018-09-13 | Resolved: 2023-02-06

## 2023-02-06 PROBLEM — H53.9 VISUAL CHANGES: Status: RESOLVED | Noted: 2017-12-20 | Resolved: 2023-02-06

## 2023-02-06 PROBLEM — R31.0 GROSS HEMATURIA: Status: RESOLVED | Noted: 2022-12-21 | Resolved: 2023-02-06

## 2023-02-06 PROBLEM — R17 JAUNDICE OF RECENT ONSET: Status: RESOLVED | Noted: 2018-02-11 | Resolved: 2023-02-06

## 2023-02-06 PROBLEM — N83.8 COMPLEX CYST OF UTERINE ADNEXA: Status: RESOLVED | Noted: 2023-01-10 | Resolved: 2023-02-06

## 2023-02-06 PROBLEM — N95.2 ATROPHIC VAGINITIS: Status: RESOLVED | Noted: 2022-12-21 | Resolved: 2023-02-06

## 2023-02-06 PROBLEM — E05.90 HYPERTHYROIDISM: Status: RESOLVED | Noted: 2017-12-12 | Resolved: 2023-02-06

## 2023-02-06 PROBLEM — K71.9 DRUG INDUCED LIVER DISEASE: Status: RESOLVED | Noted: 2018-03-22 | Resolved: 2023-02-06

## 2023-02-06 PROBLEM — D25.1 FIBROIDS, INTRAMURAL: Status: RESOLVED | Noted: 2023-01-10 | Resolved: 2023-02-06

## 2023-02-06 PROBLEM — E80.6 HYPERBILIRUBINEMIA: Status: RESOLVED | Noted: 2018-02-20 | Resolved: 2023-02-06

## 2023-02-06 PROBLEM — E87.6 HYPOKALEMIA: Status: RESOLVED | Noted: 2017-12-09 | Resolved: 2023-02-06

## 2023-02-06 PROBLEM — R93.89 THICKENED ENDOMETRIUM: Status: RESOLVED | Noted: 2023-01-10 | Resolved: 2023-02-06

## 2023-02-06 PROBLEM — M17.11 PRIMARY OSTEOARTHRITIS OF RIGHT KNEE: Status: RESOLVED | Noted: 2022-12-20 | Resolved: 2023-02-06

## 2023-02-06 LAB
DX PRELIMINARY: NORMAL
LAB AP CASE REPORT: NORMAL
LAB AP DIAGNOSIS COMMENT: NORMAL
LAB AP SYNOPTIC CHECKLIST: NORMAL
Lab: NORMAL
PATH REPORT.FINAL DX SPEC: NORMAL
PATH REPORT.GROSS SPEC: NORMAL

## 2023-02-06 PROCEDURE — 99213 OFFICE O/P EST LOW 20 MIN: CPT | Performed by: OBSTETRICS & GYNECOLOGY

## 2023-02-06 NOTE — PROGRESS NOTES
Age: 67 y.o.  Sex: female  :  1955  MRN: [id number]       REFERRING PHYSICIAN: No ref. provider found       Bowen Concepcion presents to [facility]. 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       Oncology/Hematology History Overview Note   Bowen Concepcion is a 67 y.o. female referred by Dr. Maximiliano Barrett for PMB, Pelvic Mass & Thickened Endometrium     • 22: Pap smear - negative   • 1/10/23: TVUS - Uterus 8.53 x 5.21 x 5.57 cm. ET 1.26 cm. Ovaries not clearly identified. There are multiple small uterine fibroids noted.  The largest measures 2.3 x 2.5 cm.  There is an additional 1.5 x 1.7 cm fibroid. There is a large midline complex pelvic mass with irregular borders and at least 1 internal septation This mass measures 14.0 x 11.5 x 9.0 cm The mass appears to be arising from one of the ovaries.  • 23: Exploratory Laparotomy, Total abdominal hysterectomy, bilateral salpingo-oophorectomy, pelvic and aortic lymphadenecotmy, omentectomy   • PATHOLOGY: Preliminary Diagnosis - synchronous borderline ovarian tumor 17cm, no surface involvement, LVSI negative.  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     23: POST OP      Date            Value      Ref Range            Status  01/10/2023      49.7      0.0 - 38.1 U/mL          Final           Past Medical History:  Past Medical History:   Diagnosis Date   • Abnormal weight loss    • Anxiety    • Arthritis     OSTEOARTHRITIS RIGHT KNEE AND INDEX FINGERS   • Bipolar disorder (HCC)    • Cataract     developing   • Chronic atrial fibrillation (HCC) 2017   • Chronic headaches    • Depression    • GERD (gastroesophageal  reflux disease)     with spicy food   • Hyperkalemia    • Hyperthyroidism 12/12/2017   • Hypokalemia    • Mild mitral regurgitation    • Mild tricuspid regurgitation    • Mixed hyperlipidemia    • Morbid obesity with BMI of 40.0-44.9, adult ([facility]) 06/03/2019   • Osteopenia osteopenia   • Post-menopausal bleeding    • Scoliosis    • Sleep apnea     USES CPAP   • Uterine mass     muliple masses, 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   • Visual impairment cataracts developing       Past Surgical History:  Past Surgical History:   Procedure Laterality Date   • COLONOSCOPY  2005   • COLONOSCOPY N/A 06/02/2022    Procedure: COLONOSCOPY to terminal ileum with polypectomy (cold bx);  Surgeon: Inder Brand MD;  Location: [facility] ENDOSCOPY;  Service: Gastroenterology;  Laterality: N/A;  pre - family hx colon polyps  post - polyps, hemorrhoids   • EXPLORATORY LAPAROTOMY N/A 1/24/2023    Procedure: EXPLORATORY LAPAROTOMY, TOTAL ABDOMINAL HYSTERECTOMY, BILATERAL SALPINGO OOPHORECTOMY WITH STAGING;  Surgeon: Abena Abdalla DO;  Location: [facility] MAIN OR;  Service: Gynecology Oncology;  Laterality: N/A;   • UPPER GASTROINTESTINAL ENDOSCOPY  2001        Current Meds:  Scheduled Meds:  Continuous Infusions:No current facility-administered medications for this visit.    PRN Meds:.    ALLERGIES:  Allergies   Allergen Reactions   • Desyrel [Trazodone] Nausea And Vomiting   • Modafinil Other (See Comments)     Those manufactured in [place].  Weight gain, cough         ROS:  CONSTITUTIONAL:  Denies fever or chills.   NEUROLOGIC:  Denies headache, focal weakness or sensory changes.   EYES:  Denies change in visual acuity.  HEENT:  Denies nasal congestion or sore throat.   RESPIRATORY:  Denies cough or shortness of breath.   CARDIOVASCULAR:  Denies chest pain or edema.   GI:  Denies abdominal pain, nausea, vomiting, bloody stools or diarrhea.   :  Denies dysuria, leaking or incontinence.  MUSCULOSKELETAL:  Denies back pain or joint pain.   INTEGUMENT:   Denies rash.   ENDOCRINE:  Denies polyuria or polydipsia.   LYMPHATIC:  Denies swollen glands or lymphedema.   PSYCHIATRIC:  Denies depression or anxiety.        PHYSICAL EXAM:  There were no vitals filed for this visit.  There is no height or weight on file to calculate BMI.  Current Status 1/18/2023   ECOG score 0       GEN: alert and oriented x 3, normal affect, well nourished and hydrated.  CARDIO: regular rate and rhythm.  PULM: Lungs CTA b.l, no RRW   ABD:Soft, nontender, nondistended.   GYN: defer  EXT: No petechiae, bruising, rash, candida. No CCE.           Result Review :  The pertinent labs, images, and/or pathology as noted in the oncology history were reviewed independently and discussed with the patient.   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   · Awaiting final path on this - sent for outside consultation   • A fib   • biploar d/o   • BARBIE   • Goiter/hyperthyroid  • H/o liver disease - jaundice, hyperbliirubinemia      PLAN :  • Reviewed prelim path with patient, advised that she has synchronous primaries. Based on the endometiral caricnoma, will require radiation therapy only.  (Assuming no more advanced lesion is found on the ovary.)   • Will place televisit in 2 weeks to review final path and assure that what we discussed today still holds true .            Abena Abdalla D.O  2/6/2023    Gynecologic Oncology   [address] [place] [address] [address] [place] [address]  [phone] office

## 2023-02-07 ENCOUNTER — OFFICE VISIT (OUTPATIENT)
Dept: ORTHOPEDIC SURGERY | Facility: CLINIC | Age: 68
End: 2023-02-07
Payer: MEDICARE

## 2023-02-07 VITALS — HEIGHT: 62 IN | TEMPERATURE: 96.8 F | WEIGHT: 244 LBS | BODY MASS INDEX: 44.9 KG/M2

## 2023-02-07 DIAGNOSIS — R52 PAIN: Primary | ICD-10-CM

## 2023-02-07 DIAGNOSIS — M17.12 PRIMARY OSTEOARTHRITIS OF LEFT KNEE: ICD-10-CM

## 2023-02-07 PROCEDURE — 99214 OFFICE O/P EST MOD 30 MIN: CPT | Performed by: NURSE PRACTITIONER

## 2023-02-07 PROCEDURE — 73562 X-RAY EXAM OF KNEE 3: CPT | Performed by: NURSE PRACTITIONER

## 2023-02-07 NOTE — PROGRESS NOTES
Patient: Bowen Concepcion  YOB: 1955 67 y.o. female  Medical Record Number: [id number]    Chief Complaints:   Chief Complaint   Patient presents with   • Left Knee - Initial Evaluation       History of Present Illness:Bowen Concepcion is a 67 y.o. female who presents with new complaint of left knee pain.  We have seen the patient previously for her right knee which is bone-on-bone because she has had multiple recent medical issues we have needed to hold off on surgery.  She apparently has not been as active and while she has had pain in her left knee previously it really started becoming painful over the last 2 to 3 weeks.  Patient denies any injury.  She has tried over-the-counter medications with minimal improvement but she is not able to take anti-inflammatories because she is on Eliquis.    Allergies:   Allergies   Allergen Reactions   • Desyrel [Trazodone] Nausea And Vomiting   • Modafinil Other (See Comments)     Those manufactured in [place].  Weight gain, cough       Medications:   Current Outpatient Medications   Medication Sig Dispense Refill   • acetaminophen (TYLENOL) 500 MG tablet Take 500 mg by mouth Every 6 (Six) Hours As Needed for Mild Pain.     • apixaban (ELIQUIS) 5 MG tablet tablet Take 1 tablet by mouth 2 (Two) Times a Day. (Patient taking differently: Take 5 mg by mouth 2 (Two) Times a Day. HAS NOT STARTED TAKING AT TIME OF PAT APPT) 180 tablet 3   • Armodafinil 250 MG tablet Take 250 mg by mouth Daily. PLANS TO HOLD FOR 48 HOURS PRIOR TO DOS     • Calcium 500-100 MG-UNIT chewable tablet Chew 1 tablet Daily.     • CHLORHEXIDINE GLUCONATE CLOTH EX Apply  topically. USE AS DIRECTED     • Cholecalciferol (VITAMIN D) 2000 units capsule Take 2,000 Units by mouth Daily. HOLDING FOR DOS     • clonazePAM (KlonoPIN) 0.5 MG tablet Take 0.5 mg by mouth 2 (Two) Times a Day As Needed for Anxiety.     • metoprolol succinate XL (TOPROL-XL) 100 MG 24 hr tablet Take 1 tablet by mouth Daily. 90 tablet  "2   • Multiple Vitamins-Minerals (PRESERVISION AREDS 2 PO) Take 1 tablet by mouth Daily. HOLDING FOR DOS     • pramipexole (MIRAPEX) 0.25 MG tablet Take 0.25 mg by mouth 2 (Two) Times a Day.     • topiramate (TOPAMAX) 100 MG tablet Take 100 mg by mouth Daily As Needed.       No current facility-administered medications for this visit.         The following portions of the patient's history were reviewed and updated as appropriate: allergies, current medications, past family history, past medical history, past social history, past surgical history and problem list.    Review of Systems:   A 14 point review of systems was performed. All systems negative except pertinent positives/negative listed in HPI above    Physical Exam:   Vitals:    02/07/23 0943   Temp: 96.8 °F (36 °C)   TempSrc: Temporal   Weight: 111 kg (244 lb)   Height: 157.5 cm (62.01\")       General: A and O x 3, ASA, NAD    SCLERA:    Normal    Skin clear no unusual lesions noted  Left knee patient has no appreciable effusion in the knee with 112 degrees flexion neutral in extension with Christina femoral crepitus noted.  She does have 1+ pedal edema with some mild stasis dermatitis noted.       Radiology:  Xrays 3views (ap,lateral, sunrise) left knee were ordered and reviewed today secondary to increasing pain and show moderate patellofemoral arthritic changes.  No compared to views available    Assessment/Plan: Osteoarthritis left knee with increasing pain  Left lower extremity pedal edema with stasis dermatitis    Patient and I discussed options, at this point would be too risky to do a cortisone injection given the amount of swelling and some redness that is starting develop in her left leg.  Patient verbalized understanding.  We will try Pennsaid gel twice a day ice compression elevation but I have really recommended that she contact her PCP as soon as she leaves here to be seen for the swelling in her left lower extremity.  Patient verbalized " understanding reports she will call them as soon as she leaves.      Holley Agudelo, APRN  2/7/2023  Answers for HPI/ROS submitted by the patient on 2/3/2023  Please describe your symptoms.: Osteoarthritis in left knee is getting worse, not helpful getting up and down when dealing with a large abdominal incision from surgery.  Have you had these symptoms before?: Yes  How long have you been having these symptoms?: Greater than 2 weeks  Please list any medications you are currently taking for this condition.: Tylenol  Please describe any probable cause for these symptoms. : Osteoarthritis, probably over-relied on left knee for years because of problems with right knee.  What is the primary reason for your visit?: Other

## 2023-02-07 NOTE — PROGRESS NOTES
"Enter Query Response Below      Query Response:    yes           If applicable, please update the problem list.       Patient: Bowen Concepcion        : 1955  Account: [id number]           Admit Date: 2023        How to Respond to this query:       a. Click New Note     b. Answer query within the yellow box.                c. Update the Problem List, if applicable.      If you have any questions about this query contact me at: [email or web address]    Dr. Abdalla,    Based on Medicare billing guidelines [facility] are not allowed to use diagnoses from pathology reports as the sole basis for billing. Any findings noted on a pathology report must be affirmed by the treating physician before billing.  The pathologist reported that the specimen shows \"High grade (FIGO grade 3) endometrial endometrioid carcinoma, mismatch repair deficient, with invasion into the inner half of the myometrium. Myometrium with leiomyomata. Bilateral fallopian tubes and cervix with no significant histopathologic changes. Senescent left ovary.  Adrenal cortical rest in right adnexal soft tissue.\" The patient was treated with exploratory lapatotomy total adbominal hysterectomy, bilateral salpingooophorectomy, pelvic and aortic lymphadenectomy and omentectomy.    Is this finding consistent with your clinical impression and treatment plan for this patient?    - Yes, high grade (FIGO grade 3) endometrial endometrioid carcinoma, mismatch repair deficient, with invasion into the inner half of the myometrium. Myometrium with leiomyomata. Bilateral fallopian tubes and cervix with no significant histopathologic changes. Senescent left ovary.  Adrenal cortical rest in right adnexal soft tissue.  - No, (please specify) ________________  - Other explanation for clinical impression and treatment plan _____________  - Clinically indeterminable    By submitting this query, we are merely seeking further clarification of " documentation to accurately reflect all conditions that you are monitoring, evaluating, treating or that extend the hospitalization or utilize additional resources of care. Please utilize your independent clinical judgment when addressing the question(s) above.     This query and your response, once completed, will be entered into the legal medical record.    Sincerely,  Ange Andres RN  Clinical Documentation Integrity Program

## 2023-02-08 ENCOUNTER — OFFICE VISIT (OUTPATIENT)
Dept: INTERNAL MEDICINE | Facility: CLINIC | Age: 68
End: 2023-02-08
Payer: MEDICARE

## 2023-02-08 VITALS
DIASTOLIC BLOOD PRESSURE: 62 MMHG | SYSTOLIC BLOOD PRESSURE: 114 MMHG | BODY MASS INDEX: 45.42 KG/M2 | OXYGEN SATURATION: 99 % | WEIGHT: 246.8 LBS | HEIGHT: 62 IN | HEART RATE: 72 BPM

## 2023-02-08 DIAGNOSIS — R60.0 BILATERAL LOWER EXTREMITY EDEMA: ICD-10-CM

## 2023-02-08 DIAGNOSIS — L03.119 CELLULITIS OF LOWER EXTREMITY, UNSPECIFIED LATERALITY: Primary | ICD-10-CM

## 2023-02-08 PROCEDURE — 99213 OFFICE O/P EST LOW 20 MIN: CPT | Performed by: NURSE PRACTITIONER

## 2023-02-08 RX ORDER — CEPHALEXIN 500 MG/1
500 CAPSULE ORAL 2 TIMES DAILY
Qty: 14 CAPSULE | Refills: 0 | Status: SHIPPED | OUTPATIENT
Start: 2023-02-08 | End: 2023-02-15

## 2023-02-08 NOTE — PATIENT INSTRUCTIONS
Please watch her salt intake.  Monitor your blood pressure.  I have given you Keflex to take twice a day for the next 7 days.  Please wash your legs and pat them dry at least 1 time a day.  And wear your compression stockings first thing in the morning put them on and then at bedtime you can take them off.  If these are too tight for you go on [employer] and buy some nursing socks or compression socks.

## 2023-02-08 NOTE — PROGRESS NOTES
"Chief Complaint  swelling in both legs - mostly left    Subjective        Bowen Concepcion presents to [facility]  History of Present Illness    Patient is a pleasant 67-year-old female who typically sees  here in the office.  Patient I have seen 1 time previously.  Patient presents to the clinic today with concerns of bilateral edema in both legs but more so in the left.  Patient states that she has noticed the swelling increased after her hysterectomy that she had done that was on January 24, 2022.    Patient was in a rehabilitation facility after the surgery for 1 week.  Following a hospitalization for for 5 days.    Patient went yesterday to her orthopedic providers office and saw Mitali Agudelo who denied an injection of steroids to the left knee due to swelling and some possible cellulitis.    Denies shortness of breath or chest pain.    Objective   Vital Signs:  /62 (BP Location: Left arm, Patient Position: Sitting, Cuff Size: Large Adult)   Pulse 72   Ht 157.5 cm (62.01\")   Wt 112 kg (246 lb 12.8 oz)   SpO2 99%   BMI 45.13 kg/m²   Estimated body mass index is 45.13 kg/m² as calculated from the following:    Height as of this encounter: 157.5 cm (62.01\").    Weight as of this encounter: 112 kg (246 lb 12.8 oz).             Physical Exam  Vitals and nursing note reviewed.   Constitutional:       Appearance: She is obese.   HENT:      Head: Normocephalic.      Nose: Nose normal.      Mouth/Throat:      Mouth: Mucous membranes are moist.   Eyes:      Pupils: Pupils are equal, round, and reactive to light.   Cardiovascular:      Rate and Rhythm: Normal rate and regular rhythm.      Pulses: Normal pulses.      Heart sounds: Normal heart sounds.      Comments: 2+ bilateral peripheral edema normal for patient  Pulmonary:      Effort: Pulmonary effort is normal. No respiratory distress.      Breath sounds: Normal breath sounds. No stridor. No wheezing, rhonchi or " rales.      Comments: Denies shortness of breath  Chest:      Chest wall: No tenderness.   Musculoskeletal:      Right lower leg: Edema present.      Left lower leg: Edema present.      Comments: Using rolling walker for assistive device  Bilateral lower extremity edema 2+   Skin:     General: Skin is warm.      Capillary Refill: Capillary refill takes less than 2 seconds.      Findings: Erythema and rash present.          Neurological:      Mental Status: She is alert and oriented to person, place, and time.   Psychiatric:         Behavior: Behavior normal.        Result Review :    Common labs    Common Labs 1/26/23 1/26/23 1/27/23 1/27/23 1/28/23 1/28/23    0605 0605 0509 [id number] [id number]   Glucose  89  87  86   BUN  14  10  9   Creatinine  0.56 (A)  0.45 (A)  0.46 (A)   Sodium  142  142  143   Potassium  3.7  3.6  4.1   Chloride  114 (A)  111 (A)  110 (A)   Calcium  7.6 (A)  8.2 (A)  8.5 (A)   WBC 7.90  6.04  5.45    Hemoglobin 9.9 (A)  10.0 (A)  10.4 (A)    Hematocrit 31.2 (A)  30.2 (A)  32.5 (A)    Platelets 189  178  200    (A) Abnormal value              Office Visit with Holley Agudelo APRN (02/07/2023)             Assessment and Plan   Diagnoses and all orders for this visit:    1. Cellulitis of lower extremity, unspecified laterality (Primary)    2. Bilateral lower extremity edema    Other orders  -     cephalexin (Keflex) 500 MG capsule; Take 1 capsule by mouth 2 (Two) Times a Day for 7 days.  Dispense: 14 capsule; Refill: 0    Please watch your salt intake.  Monitor your blood pressure.  I have given you Keflex to take twice a day for the next 7 days.  Please wash your legs and pat them dry at least 1 time a day.  And wear your compression stockings first thing in the morning put them on and then at bedtime you can take them off.  If these are too tight for you go on [employer] and buy some nursing socks or compression socks.         Follow Up   Return in about 1 week (around 2/15/2023) for  Recheck.  Patient was given instructions and counseling regarding her condition or for health maintenance advice. Please see specific information pulled into the AVS if appropriate.       Answers for HPI/ROS submitted by the patient on 2/8/2023  Please describe your symptoms.: Legs swelling  Have you had these symptoms before?: Yes  How long have you been having these symptoms?: 1-2 weeks  Please list any medications you are currently taking for this condition.: None  Please describe any probable cause for these symptoms. : Was told this frequently happened after surgery, but am surprised that it is persisting. When i saw the physician’s assistant for my orthopedist regarding a knee problem, she insisted I see my general practitioner about medication to treat the swelling. She referred to water pills & possibly Antibiotics.  What is the primary reason for your visit?: Other

## 2023-02-15 ENCOUNTER — OFFICE VISIT (OUTPATIENT)
Dept: INTERNAL MEDICINE | Facility: CLINIC | Age: 68
End: 2023-02-15
Payer: MEDICARE

## 2023-02-15 VITALS
DIASTOLIC BLOOD PRESSURE: 72 MMHG | HEIGHT: 62 IN | SYSTOLIC BLOOD PRESSURE: 118 MMHG | BODY MASS INDEX: 45.27 KG/M2 | WEIGHT: 246 LBS | OXYGEN SATURATION: 96 % | HEART RATE: 98 BPM | TEMPERATURE: 97.9 F

## 2023-02-15 DIAGNOSIS — R60.0 BILATERAL LOWER EXTREMITY EDEMA: ICD-10-CM

## 2023-02-15 DIAGNOSIS — L03.119 CELLULITIS OF LOWER EXTREMITY, UNSPECIFIED LATERALITY: Primary | ICD-10-CM

## 2023-02-15 PROCEDURE — 99214 OFFICE O/P EST MOD 30 MIN: CPT | Performed by: NURSE PRACTITIONER

## 2023-02-15 RX ORDER — FUROSEMIDE 20 MG/1
20 TABLET ORAL DAILY
Qty: 7 TABLET | Refills: 0 | Status: SHIPPED | OUTPATIENT
Start: 2023-02-15 | End: 2023-02-22 | Stop reason: SDUPTHER

## 2023-02-22 ENCOUNTER — OFFICE VISIT (OUTPATIENT)
Dept: INTERNAL MEDICINE | Facility: CLINIC | Age: 68
End: 2023-02-22
Payer: MEDICARE

## 2023-02-22 ENCOUNTER — TELEPHONE (OUTPATIENT)
Dept: GYNECOLOGIC ONCOLOGY | Age: 68
End: 2023-02-22

## 2023-02-22 ENCOUNTER — OFFICE VISIT (OUTPATIENT)
Dept: GYNECOLOGIC ONCOLOGY | Facility: CLINIC | Age: 68
End: 2023-02-22
Payer: MEDICARE

## 2023-02-22 VITALS
HEIGHT: 62 IN | WEIGHT: 235 LBS | DIASTOLIC BLOOD PRESSURE: 70 MMHG | OXYGEN SATURATION: 95 % | BODY MASS INDEX: 43.24 KG/M2 | TEMPERATURE: 97.5 F | RESPIRATION RATE: 18 BRPM | SYSTOLIC BLOOD PRESSURE: 122 MMHG | HEART RATE: 95 BPM

## 2023-02-22 DIAGNOSIS — M79.89 LEG SWELLING: Primary | ICD-10-CM

## 2023-02-22 DIAGNOSIS — C54.1 ENDOMETRIAL CARCINOMA: Primary | ICD-10-CM

## 2023-02-22 LAB
ALBUMIN SERPL-MCNC: 4.1 G/DL (ref 3.5–5.2)
ALBUMIN/GLOB SERPL: 1.9 G/DL
ALP SERPL-CCNC: 103 U/L (ref 39–117)
ALT SERPL-CCNC: 7 U/L (ref 1–33)
AST SERPL-CCNC: 13 U/L (ref 1–32)
BASOPHILS # BLD AUTO: 0.04 10*3/MM3 (ref 0–0.2)
BASOPHILS NFR BLD AUTO: 0.7 % (ref 0–1.5)
BILIRUB SERPL-MCNC: 0.2 MG/DL (ref 0–1.2)
BUN SERPL-MCNC: 17 MG/DL (ref 8–23)
BUN/CREAT SERPL: 26.2 (ref 7–25)
CALCIUM SERPL-MCNC: 9.2 MG/DL (ref 8.6–10.5)
CHLORIDE SERPL-SCNC: 107 MMOL/L (ref 98–107)
CO2 SERPL-SCNC: 29.5 MMOL/L (ref 22–29)
CREAT SERPL-MCNC: 0.65 MG/DL (ref 0.57–1)
EGFRCR SERPLBLD CKD-EPI 2021: 96 ML/MIN/1.73
EOSINOPHIL # BLD AUTO: 0.2 10*3/MM3 (ref 0–0.4)
EOSINOPHIL NFR BLD AUTO: 3.6 % (ref 0.3–6.2)
ERYTHROCYTE [DISTWIDTH] IN BLOOD BY AUTOMATED COUNT: 13.6 % (ref 12.3–15.4)
GLOBULIN SER CALC-MCNC: 2.2 GM/DL
GLUCOSE SERPL-MCNC: 98 MG/DL (ref 65–99)
HCT VFR BLD AUTO: 35.6 % (ref 34–46.6)
HGB BLD-MCNC: 11.3 G/DL (ref 12–15.9)
IMM GRANULOCYTES # BLD AUTO: 0.01 10*3/MM3 (ref 0–0.05)
IMM GRANULOCYTES NFR BLD AUTO: 0.2 % (ref 0–0.5)
LYMPHOCYTES # BLD AUTO: 1.28 10*3/MM3 (ref 0.7–3.1)
LYMPHOCYTES NFR BLD AUTO: 22.8 % (ref 19.6–45.3)
MCH RBC QN AUTO: 27 PG (ref 26.6–33)
MCHC RBC AUTO-ENTMCNC: 31.7 G/DL (ref 31.5–35.7)
MCV RBC AUTO: 85 FL (ref 79–97)
MONOCYTES # BLD AUTO: 0.48 10*3/MM3 (ref 0.1–0.9)
MONOCYTES NFR BLD AUTO: 8.5 % (ref 5–12)
NEUTROPHILS # BLD AUTO: 3.61 10*3/MM3 (ref 1.7–7)
NEUTROPHILS NFR BLD AUTO: 64.2 % (ref 42.7–76)
NRBC BLD AUTO-RTO: 0.2 /100 WBC (ref 0–0.2)
PLATELET # BLD AUTO: 202 10*3/MM3 (ref 140–450)
POTASSIUM SERPL-SCNC: 4.3 MMOL/L (ref 3.5–5.2)
PROT SERPL-MCNC: 6.3 G/DL (ref 6–8.5)
RBC # BLD AUTO: 4.19 10*6/MM3 (ref 3.77–5.28)
SODIUM SERPL-SCNC: 145 MMOL/L (ref 136–145)
WBC # BLD AUTO: 5.62 10*3/MM3 (ref 3.4–10.8)

## 2023-02-22 PROCEDURE — 99214 OFFICE O/P EST MOD 30 MIN: CPT | Performed by: FAMILY MEDICINE

## 2023-02-22 RX ORDER — FUROSEMIDE 20 MG/1
20 TABLET ORAL DAILY
Qty: 14 TABLET | Refills: 0 | Status: SHIPPED | OUTPATIENT
Start: 2023-02-22 | End: 2023-03-10

## 2023-02-22 RX ORDER — POTASSIUM CHLORIDE 20 MEQ/1
20 TABLET, EXTENDED RELEASE ORAL DAILY
Qty: 14 TABLET | Refills: 0 | Status: SHIPPED | OUTPATIENT
Start: 2023-02-22 | End: 2023-03-08

## 2023-02-22 NOTE — TELEPHONE ENCOUNTER
Caller: Bowen Concepcion    Relationship: Self    Best call back number: [phone]    What is the best time to reach you: ANYTIME    Who are you requesting to speak with (clinical staff, provider, specific staff member): DR MULLIGAN    What was the call regarding: PT RETURNING CALL FOR DR MULLIGAN - PLEASE CALL PT FOR HER TELEVISIT.     Do you require a callback: YES

## 2023-02-26 NOTE — PROGRESS NOTES
"Chief Complaint  Cellulitis (Follow-up)    Subjective        Bowen Concepcion presents to [facility]  History of Present Illness    Patient following up on her leg swelling.  Patient does state that taking the Lasix to has seemed to help with the leg swelling.  She is exactly unclear exactly why the leg swelling started.    Objective   Vital Signs:  /70   Pulse 95   Temp 97.5 °F (36.4 °C)   Resp 18   Ht 157.5 cm (62\")   Wt 107 kg (235 lb)   SpO2 95%   BMI 42.98 kg/m²   Estimated body mass index is 42.98 kg/m² as calculated from the following:    Height as of this encounter: 157.5 cm (62\").    Weight as of this encounter: 107 kg (235 lb).             Physical Exam  Vitals and nursing note reviewed.   Constitutional:       Appearance: She is well-developed.   HENT:      Head: Normocephalic and atraumatic.   Musculoskeletal:      Cervical back: Normal range of motion and neck supple.   Neurological:      Mental Status: She is alert and oriented to person, place, and time.   Psychiatric:         Behavior: Behavior normal.        Result Review :                   Assessment and Plan   Diagnoses and all orders for this visit:    1. Leg swelling (Primary)  -     furosemide (Lasix) 20 MG tablet; Take 1 tablet by mouth Daily for 14 days.  Dispense: 14 tablet; Refill: 0  -     potassium chloride (K-DUR,KLOR-CON) 20 MEQ CR tablet; Take 1 tablet by mouth Daily for 14 days.  Dispense: 14 tablet; Refill: 0  -     CBC & Differential  -     Comprehensive Metabolic Panel    It does appear at today's visit that she has lost some weight, which may be due to water weight.  She is doing compression stockings, I did discuss her with her to continue the compression stockings.  Would like her to do Lasix daily for the next couple weeks.  She is to potassium on those days as well.  We will check a CBC and a CMP at today's visit.  She should follow-up with her cardiologist to see if there is any " underlying cause for the bilateral leg swelling.         Follow Up   No follow-ups on file.  Patient was given instructions and counseling regarding her condition or for health maintenance advice. Please see specific information pulled into the AVS if appropriate.

## 2023-03-03 PROBLEM — R60.0 BILATERAL LOWER EXTREMITY EDEMA: Status: ACTIVE | Noted: 2023-03-03

## 2023-03-03 NOTE — ASSESSMENT & PLAN NOTE
Unstable.  Start Lasix 20 mg daily.  Continue compression stockings.  Follow-up in the office with Dr. Pitts in 2 weeks.  Please get into see cardiology.  He had any worsening swelling with any shortness of breath or chest pain with pressure please go to the emergency room.  Patient agrees with treatment plan at this time.

## 2023-03-09 ENCOUNTER — TELEPHONE (OUTPATIENT)
Dept: RADIATION ONCOLOGY | Facility: HOSPITAL | Age: 68
End: 2023-03-09
Payer: MEDICARE

## 2023-03-10 ENCOUNTER — APPOINTMENT (OUTPATIENT)
Dept: RADIATION ONCOLOGY | Facility: HOSPITAL | Age: 68
End: 2023-03-10
Payer: MEDICARE

## 2023-03-10 ENCOUNTER — CONSULT (OUTPATIENT)
Dept: RADIATION ONCOLOGY | Facility: HOSPITAL | Age: 68
End: 2023-03-10
Payer: MEDICARE

## 2023-03-10 VITALS
WEIGHT: 240 LBS | HEART RATE: 102 BPM | DIASTOLIC BLOOD PRESSURE: 103 MMHG | BODY MASS INDEX: 43.9 KG/M2 | OXYGEN SATURATION: 95 % | SYSTOLIC BLOOD PRESSURE: 145 MMHG

## 2023-03-10 DIAGNOSIS — C54.1 ENDOMETRIAL ADENOCARCINOMA: Primary | ICD-10-CM

## 2023-03-10 PROCEDURE — 1126F AMNT PAIN NOTED NONE PRSNT: CPT | Performed by: RADIOLOGY

## 2023-03-10 PROCEDURE — 99205 OFFICE O/P NEW HI 60 MIN: CPT | Performed by: RADIOLOGY

## 2023-03-10 PROCEDURE — 77263 THER RADIOLOGY TX PLNG CPLX: CPT | Performed by: RADIOLOGY

## 2023-03-10 NOTE — PROGRESS NOTES
[facility] Radiation Oncology   Consult    Chief Complaint  Endometrial Carcinoma      Diagnosis: Endometrioid Endometrial Cancer    Overall Stage FIGO IB G3    -      Pacemaker: {YES:75036}  Prior History of Radiation: {YES:58044}  Contraindications to Radiation: {YES:56865}  Patient Requires Pregnancy Test: {Radiation Pregnancy Test:50878}    HPI:    Bowen Concepcion presents to [facility] RADIATION [facility]COLOGY with a history of ***    ***    Imaging:      Pelvic US 1/10/2023    Impression:     1.  Uterus: Enlarged     2.  Endometrium: Thickened and heterogenous      3.  Myometrium: Multiple fibroids, 2 in number, ranging in size 2.5 to 1.5 cm      4.  Ovaries  The ovaries are not clearly identified.  There is a large complex pelvic mass with irregular borders and at least one septation.  This mass is highly suspicious for gynecologic malignancy.  It appears to be arising from one of the ovaries.      CT AP 1/18/2023    IMPRESSION:  1. The 17 x 15 x 13 cm complex cystic mass within the pelvis has an  approximately 7 cm solid component along the anterior wall on the right.  The mass appears to arise from the left adnexa. There is no  lymphadenopathy or free fluid.  2. There is cholelithiasis without evidence for cholecystitis.      Pathology:      LIANET/BSO + Pelvic and Aortic Lymphadenectomy and Omentectomy Pathology 1/24/2023      Final Diagnosis   1. 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  Fimbriated portion of right fallopian tube with no significant histopathologic change.     2. Uterus, Left Ovary and Bilateral Fallopian Tubes, Total Hysterectomy, Right Salpingectomy and Left Salpingo-oophorectomy: High grade (FIGO grade 3) endometrial endometrioid carcinoma, mismatch repair deficient, with invasion into the inner half of the myometrium.   Myometrium with leiomyomata.  Bilateral fallopian tubes and cervix with no significant histopathologic changes.  Senescent left ovary.  Adrenal cortical rest in right adnexal soft tissue.     3. Left External Iliac Lymph Node, Excision: One lymph node, negative for carcinoma (0/1).       4. Left Obturator Lymph Node, Excision: One lymph node, negative for carcinoma (0/1).     5. Left Aortic Lymph Node, Excision: One lymph node, negative for carcinoma (0/1).     6. Right External Lymph Nodes, Dissection: Two lymph nodes, negative for carcinoma (0/2).     7. Right Obturator Lymph Node, Excisions: One lymph node, negative for carcinoma (0/1).     8. Right Aortic Lymph Node, Excision: One lymph node, negative for carcinoma (0/1).     9. Omentum, Omentectomy: Benign fibroadipose tissue.     OVARY or FALLOPIAN TUBE or PRIMARY PERITONEUM  8th Edition - Protocol posted: 3/23/2022  OVARY OR FALLOPIAN TUBE OR PRIMARY PERITONEUM - All Specimens  SPECIMEN   Procedure  Right salpingo-oophorectomy    Hysterectomy Type  Not specified    Specimen Integrity     Right Ovary Integrity  Capsule intact    TUMOR   Tumor Site  Right ovary    Tumor Size  Greatest Dimension (Centimeters): 17 cm   Histologic Type  Serous borderline tumor    Histologic Grade  GB, borderline tumor    Ovarian Surface Involvement  Not identified    Fallopian Tube Surface Involvement  Not identified    Implants  Not identified    Other Tissue / Organ Involvement  Not identified    Peritoneal / Ascitic Fluid Involvement  Malignant cells not identified    REGIONAL LYMPH NODES   Regional Lymph Node Status  All regional lymph nodes negative for tumor cells    Number of Lymph Nodes Examined  7    Renee Site(s) Examined  Right pelvic      Left pelvic      Pelvic, NOS      Right para-aortic      Left para-aortic    PATHOLOGIC STAGE CLASSIFICATION (pTNM, AJCC 8th Edition)   Reporting of pT, pN, and (when applicable) pM categories is based on information available to the  pathologist at the time the report is issued. As per the AJCC (Chapter 1, 8th Ed.) it is the managing physician's responsibility to establish the final pathologic stage based upon all pertinent information, including but potentially not limited to this pathology report.   pT Category  pT1a    pN Category  pN0    FIGO STAGE   FIGO Stage  IA    .     ENDOMETRIUM  8th Edition - Protocol posted: 6/22/2022  ENDOMETRIUM: HYSTERECTOMY - All Specimens  SPECIMEN   Procedure  Radical hysterectomy      Left salpingo-oophorectomy      Right salpingectomy    Hysterectomy Type  Not specified    Specimen Integrity  Intact    TUMOR   Tumor Site  Endometrium    Histologic Type  Endometrioid carcinoma, NOS      Mismatch repair-deficient endometrioid carcinoma    Histologic Grade  FIGO grade 3    Myometrial Invasion  Present    Depth of Myometrial Invasion  Cannot be determined: see below    Myometrial Thickness  Cannot be determined: see below    Percentage of Myometrial Invasion  Estimated to be less than 50%    Adenomyosis  Not identified    Uterine Serosa Involvement  Not identified    Lower Uterine Segment Involvement  Not identified    Cervical Stromal Involvement  Not identified    Other Tissue / Organ Involvement  Not identified    Peritoneal / Ascitic Fluid  Malignant cells not identified    Lymphovascular Invasion (LVI)  Not identified    MARGINS   Margin Status  All margins negative for invasive carcinoma    Closest Margin(s) to Invasive Carcinoma  Parametrial / paracervical    Distance from Invasive Carcinoma to Closest Margin  Greater than: 2 mm   REGIONAL LYMPH NODES   Regional Lymph Node Status  All regional lymph nodes negative for tumor cells    Lymph Nodes Examined     Total Number of Pelvic Nodes Examined  7    Total Number of Para-aortic Nodes Examined  2    PATHOLOGIC STAGE CLASSIFICATION (pTNM, AJCC 8th Edition)   Reporting of pT, pN, and (when applicable) pM categories is based on information available to the  pathologist at the time the report is issued. As per the AJCC (Chapter 1, 8th Ed.) it is the managing physician's responsibility to establish the final pathologic stage based upon all pertinent information, including but potentially not limited to this pathology report.   pT Category  pT1a    pN Category  pN0    FIGO STAGE   FIGO Stage  IA    ADDITIONAL FINDINGS   Additional Findings  None identified        Final Diagnosis   1. Pelvic Washing:               A. Negative for malignant cells.               B. Reactive mesothelial cells, histiocytes, squamous cells, mixed inflammatory cells and blood.         Labs:    Lab Results   Component Value Date    CREATININE 0.65 02/22/2023       {Ambulatory Labs (Optional):45933}    {Problem List  Visit Diagnosis   Encounters  Notes  Medications  Labs  Result Review Imaging  Media :23}      Problem List:  Patient Active Problem List   Diagnosis   • Bipolar I disorder, single manic episode (HCC)   • Acid reflux   • HLD (hyperlipidemia)   • Chronic atrial fibrillation (HCC)   • BARBIE (obstructive sleep apnea)   • Nontoxic multinodular goiter   • Morbid obesity with BMI of 40.0-44.9, adult (HCC)   • Bilateral lower extremity edema          Medications:  Current Outpatient Medications on File Prior to Visit   Medication Sig Dispense Refill   • acetaminophen (TYLENOL) 500 MG tablet Take 500 mg by mouth Every 6 (Six) Hours As Needed for Mild Pain.     • apixaban (ELIQUIS) 5 MG tablet tablet Take 1 tablet by mouth 2 (Two) Times a Day. (Patient taking differently: Take 5 mg by mouth 2 (Two) Times a Day. HAS NOT STARTED TAKING AT TIME OF PAT APPT) 180 tablet 3   • Armodafinil 250 MG tablet Take 250 mg by mouth Daily. PLANS TO HOLD FOR 48 HOURS PRIOR TO DOS     • Calcium 500-100 MG-UNIT chewable tablet Chew 1 tablet Daily.     • Cholecalciferol (VITAMIN D) 2000 units capsule Take 2,000 Units by mouth Daily. HOLDING FOR DOS     • clonazePAM (KlonoPIN) 0.5 MG tablet Take 0.5 mg by  "mouth 2 (Two) Times a Day As Needed for Anxiety.     • furosemide (Lasix) 20 MG tablet Take 1 tablet by mouth Daily for 14 days. 14 tablet 0   • metoprolol succinate XL (TOPROL-XL) 100 MG 24 hr tablet Take 1 tablet by mouth Daily. 90 tablet 2   • Multiple Vitamins-Minerals (PRESERVISION AREDS 2 PO) Take 1 tablet by mouth Daily. HOLDING FOR DOS     • pramipexole (MIRAPEX) 0.25 MG tablet Take 0.25 mg by mouth 2 (Two) Times a Day.     • topiramate (TOPAMAX) 100 MG tablet Take 100 mg by mouth Daily As Needed.       No current facility-administered medications on file prior to visit.          Allergies:  Allergies   Allergen Reactions   • Desyrel [Trazodone] Nausea And Vomiting   • Modafinil Other (See Comments)     Those manufactured in [place].  Weight gain, cough         Family History:  {FamilyHistory/Contraindications:92859}      Social History:  {Smoking History:69191}    Distance From Clinic: {distance travelled:42395}    Patient has someone who can assist with transportation: {YES:73655}      Review of Systems:    Review of Systems      Vital Signs:  There were no vitals taken for this visit.  Estimated body mass index is 42.98 kg/m² as calculated from the following:    Height as of 23: 157.5 cm (62\").    Weight as of 23: 107 kg (235 lb).  There were no vitals filed for this visit.      ECOG: {ECO}    Physical Exam       Result Review :{Labs  Result Review  Imaging  Med Tab  Media  Procedures  :23}  {The following data was reviewed by (Optional):07610}  Labs: Last Creatinine ***  {Data reviewed (Optional):19097:::1}          {CC Problem List  Visit Diagnosis   ROS  Review (Popup)  Health Maintenance  Quality  BestPractice  Medications  SmartSets  SnapShot Encounters  Media :23}  There are no diagnoses linked to this encounter.    Assessment:    ***    Plan:    ***       {Time Spent (Optional):15045}  Follow Up {Instructions Charge Capture  Follow-up Communications :23}  No " follow-ups on file.  Patient was given instructions and counseling regarding her condition or for health maintenance advice. Please see specific information pulled into the AVS if appropriate.     Corey Scott MD

## 2023-03-10 NOTE — PROGRESS NOTES
Subjective     Abena Abdalla DO     Cancer Staging       CC: consult for FIGO IA grade 3 endometrial adenocarcinoma                                    Dear Abena Abdalla DO    I had the pleasure of seeing Bowen Concepcion  today in the Radiation Center    The patient is a 67 y.o. female with recently diagnosed endometrial adenocarcinoma.  She presented to Dr. Abdalla for management of large ovarian mass and thickened endometrium.      She had an ultrasound of the pelvis on 1/10/23 which showed the uterus 8.53 x 5.21 x 5.57 cm with multiple small uterine fibroids noted.  The largest measures 2.3 x 2.5 cm.  There is an additional 1.5 x 1.7 cm fibroid  Endometrial lining 1.26 cm. The ovaries were not clearly identified There is a large midline complex pelvic mass with irregular borders and at least 1 internal septation This mass measures 14.0 x 11.5 x 9.0 cm The mass appears to be arising from one of the ovaries. There is Doppler flow noted to the massd     She had a CT abdomen and pelvis on 23 which showed a 17 x 15 x 13cm cystic mass within the pelvis has an approximately 7 cm solid component along the anterior wall on the right. The mass appears to arise from the left adnexa. There is no lymphadenopathy or free fluid.    Her  with 49.7 on 1/10/23.     She underwent LIANET/BSO and lymph node dissection on 23 with pathology revealing a 17cm serous borderline neoplasm involving right ovary which did not extend to serosal surface, pT1aN0.  The uterus revealed high grade mixed endometrioid adenocarcinoma confined to inner half of the myometrium, no lvsi and no involvement of lower uterine segment. Seven pelvic nodes and 2 para-aortic nodes were negative for metastatic involvement.  Her pathologic stage was pT1aN0, FIGO IA grade 3.    She is recovering well from her surgery and referred today for evalaution for adjuvant radiation.      She is  with menarche age 12 and menopause age 42.  .      Review of Systems   Constitutional: Negative.    HENT: Negative.    Eyes: Negative.    Respiratory: Positive for shortness of breath and wheezing.    Cardiovascular: Positive for leg swelling.   Gastrointestinal: Negative.    Genitourinary: Negative.    Musculoskeletal: Positive for arthralgias.   Skin: Negative.    Neurological: Negative.    Psychiatric/Behavioral: Positive for dysphoric mood. The patient is nervous/anxious.          Past Medical History:   Diagnosis Date   • Abnormal weight loss    • Anxiety    • Arthritis     OSTEOARTHRITIS RIGHT KNEE AND INDEX FINGERS   • Bipolar disorder (HCC)    • Cataract 2015    developing   • Chronic atrial fibrillation ([facility]) 12/09/2017   • Chronic headaches    • Depression    • GERD (gastroesophageal reflux disease)     with spicy food   • Hyperkalemia    • Hyperthyroidism 12/12/2017   • Hypokalemia    • Mild mitral regurgitation    • Mild tricuspid regurgitation    • Mixed hyperlipidemia    • Morbid obesity with BMI of 40.0-44.9, adult ([facility]) 06/03/2019   • Osteopenia osteopenia   • Post-menopausal bleeding    • Scoliosis    • Sleep apnea     USES CPAP   • Uterine mass     muliple masses, 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   • Visual impairment cataracts developing         Past Surgical History:   Procedure Laterality Date   • COLONOSCOPY  2005   • COLONOSCOPY N/A 06/02/2022    Procedure: COLONOSCOPY to terminal ileum with polypectomy (cold bx);  Surgeon: Inder Brand MD;  Location: [facility] ENDOSCOPY;  Service: Gastroenterology;  Laterality: N/A;  pre - family hx colon polyps  post - polyps, hemorrhoids   • EXPLORATORY LAPAROTOMY N/A 1/24/2023    Procedure: EXPLORATORY LAPAROTOMY, TOTAL ABDOMINAL HYSTERECTOMY, BILATERAL SALPINGO OOPHORECTOMY WITH STAGING;  Surgeon: Abena Abdalla DO;  Location: [facility] MAIN OR;  Service: Gynecology Oncology;  Laterality: N/A;   • UPPER GASTROINTESTINAL ENDOSCOPY  2001         Social History     Socioeconomic History   • Marital status: Single    Tobacco Use   • Smoking status: Former     Packs/day: 2.00     Years: 26.00     Pack years: 52.00     Types: Cigarettes     Quit date: 1997     Years since quittin.5   • Smokeless tobacco: Never   • Tobacco comments:     caffeine use- tea   Vaping Use   • Vaping Use: Never used   Substance and Sexual Activity   • Alcohol use: No   • Drug use: No   • Sexual activity: Not Currently     Partners: Male     Birth control/protection: Post-menopausal         Family History   Problem Relation Age of Onset   • Heart disease Mother    • Hypertension Mother    • Depression Mother    • Miscarriages / Stillbirths Mother    • Cancer Father    • Depression Sister    • Cancer Sister    • Depression Sister    • Depression Brother    • Colon polyps Brother    • Atrial fibrillation Brother    • Depression Brother    • Depression Brother    • Breast cancer Maternal Aunt    • Breast cancer Maternal Aunt    • Vision loss Maternal Grandmother    • Arthritis Maternal Grandmother    • Vision loss Paternal Grandmother    • Diabetes Paternal Grandfather    • Ovarian cancer Neg Hx    • Malig Hyperthermia Neg Hx           Objective    Physical Exam  Constitutional:       Appearance: Normal appearance. She is obese.   Genitourinary:     Comments: Speculum exam reveals  Well healed vaginal cuff, no suspicious masses or lesions  Neurological:      Mental Status: She is alert.   Psychiatric:         Mood and Affect: Mood normal.           Current Outpatient Medications on File Prior to Visit   Medication Sig Dispense Refill   • acetaminophen (TYLENOL) 500 MG tablet Take 500 mg by mouth Every 6 (Six) Hours As Needed for Mild Pain.     • apixaban (ELIQUIS) 5 MG tablet tablet Take 1 tablet by mouth 2 (Two) Times a Day. (Patient taking differently: Take 5 mg by mouth 2 (Two) Times a Day. HAS NOT STARTED TAKING AT TIME OF PAT APPT) 180 tablet 3   • Armodafinil 250 MG tablet Take 250 mg by mouth Daily. PLANS TO HOLD FOR 48 HOURS PRIOR TO DOS      • Calcium 500-100 MG-UNIT chewable tablet Chew 1 tablet Daily.     • Cholecalciferol (VITAMIN D) 2000 units capsule Take 2,000 Units by mouth Daily. HOLDING FOR DOS     • clonazePAM (KlonoPIN) 0.5 MG tablet Take 0.5 mg by mouth 2 (Two) Times a Day As Needed for Anxiety.     • furosemide (Lasix) 20 MG tablet Take 1 tablet by mouth Daily for 14 days. 14 tablet 0   • metoprolol succinate XL (TOPROL-XL) 100 MG 24 hr tablet Take 1 tablet by mouth Daily. 90 tablet 2   • Multiple Vitamins-Minerals (PRESERVISION AREDS 2 PO) Take 1 tablet by mouth Daily. HOLDING FOR DOS     • pramipexole (MIRAPEX) 0.25 MG tablet Take 0.25 mg by mouth 2 (Two) Times a Day.     • topiramate (TOPAMAX) 100 MG tablet Take 100 mg by mouth Daily As Needed.       No current facility-administered medications on file prior to visit.       ALLERGIES:    Allergies   Allergen Reactions   • Desyrel [Trazodone] Nausea And Vomiting   • Modafinil Other (See Comments)     Those manufactured in [place].  Weight gain, cough       There were no vitals taken for this visit.     Current Status 2/6/2023   ECOG score 0         Assessment & Plan     67 year old female with FIGO IA grade 3 endometrial adenocarcinoma. I discussed with her my recommendation for post-operative vaginal cuff brachytherapy to decrease the risk of local recurrence.         I discussed with her in detail the risks, benefits and rationale of radiation therapy to the vaginal cuff to include but not limited to the following:     Acute: fatigue,erythema, irritation of the vaginal mucosa, breakdown/moist desquamation, irritation of bladder resulting in dysuria of blood in urine, vaginal bleeding, irritation of bowel or rectum with possible diarrhea        Late: Permanent skin changes including hyperpigmentation,telangiectasias, fibrosis of the vaginal canal resulting in vaginal foreshortening, late damage to the bladder bowel and the remote risk of second malignancies.     She voiced understanding  and was given an opportunity to ask questions which I believe were answered to her satisfaction.  I have scheduled her to return on simulation for treatment planning on Monday March 13, 2023.  I plan to deliver a dose of 30Gy in 5 fractions to the vaginal cuff.    I personally spent greater than 60 minutes today assessing, managing, discussing and documenting my visit with the patient. That time includes review of records, imaging and pathology reports, obtaining my own history, performing a medically appropriate evaluation, counseling and educating the patient, discussing goals, logistics, alternatives and risks of my recommendations, surveillance options and potential outcomes. It also includes the time documenting the clinical information in the EMR and communicating my recommendations to the other involved physicians.                Thank you very much for allowing me to participate in the care of this very pleasant patient.    Sincerely,      Gabi Maxwell MD

## 2023-03-13 PROCEDURE — 77290 THER RAD SIMULAJ FIELD CPLX: CPT | Performed by: RADIOLOGY

## 2023-03-13 PROCEDURE — 77332 RADIATION TREATMENT AID(S): CPT | Performed by: RADIOLOGY

## 2023-03-21 PROCEDURE — 77295 3-D RADIOTHERAPY PLAN: CPT | Performed by: RADIOLOGY

## 2023-03-21 PROCEDURE — 77370 RADIATION PHYSICS CONSULT: CPT | Performed by: RADIOLOGY

## 2023-03-27 ENCOUNTER — OFFICE VISIT (OUTPATIENT)
Dept: CARDIOLOGY | Facility: CLINIC | Age: 68
End: 2023-03-27
Payer: MEDICARE

## 2023-03-27 VITALS
DIASTOLIC BLOOD PRESSURE: 70 MMHG | SYSTOLIC BLOOD PRESSURE: 98 MMHG | BODY MASS INDEX: 41.96 KG/M2 | WEIGHT: 228 LBS | HEIGHT: 62 IN | OXYGEN SATURATION: 98 % | HEART RATE: 98 BPM

## 2023-03-27 DIAGNOSIS — R06.02 SOB (SHORTNESS OF BREATH): ICD-10-CM

## 2023-03-27 DIAGNOSIS — E78.2 MIXED HYPERLIPIDEMIA: Chronic | ICD-10-CM

## 2023-03-27 DIAGNOSIS — I48.20 CHRONIC ATRIAL FIBRILLATION: Primary | Chronic | ICD-10-CM

## 2023-03-27 DIAGNOSIS — R60.0 BILATERAL LOWER EXTREMITY EDEMA: ICD-10-CM

## 2023-03-27 PROCEDURE — 99214 OFFICE O/P EST MOD 30 MIN: CPT | Performed by: INTERNAL MEDICINE

## 2023-03-27 NOTE — PROGRESS NOTES
Subjective:     Encounter Date: 7/19/2021      Patient ID: Bowen Concepcion is a 67 y.o. female.    Chief Complaint: PAF  Atrial Fibrillation  Past medical history includes atrial fibrillation.       Dear Dr. Pitts,    She comes in for follow-up of her chronic atrial fibrillation.  She initially presented with hyperthyroidism, acute.  She was also found to have atrial fibrillation.  She also at that time was hospitalized for cholestatic hepatitis with her total bilirubin over 17.  She was not felt to be a candidate for anticoagulation nor antiarrhythmic therapy at the time, and rate control was utilized.  She had a CHADS Vascular score of 1, and has been maintained on aspirin since her liver function recovered-she decided to remain on aspirin after 65.    She also comes in because she recently had to have surgery for uterine cancer and had issues with shortness of breath and bilateral lower extremity edema afterwards.  She says they told her that that could happen after they did the surgery since he had to take some any lymph nodes but they wanted her seen to check for any possible cardiac contribution.  She says has been doing much better lately.  No chest pain or chest discomfort.  No palpitations.    No further issues from her liver standpoint.  No bleeding complications.      Past Medical History:   Diagnosis Date   • Abnormal weight loss    • Anxiety    • Arthritis     OSTEOARTHRITIS RIGHT KNEE AND INDEX FINGERS   • Bipolar disorder (HCC)    • Cancer (HCC) 2023    UTERINE CANCER   • Cataract 2015    developing   • Chronic atrial fibrillation (HCC) 12/09/2017   • Chronic headaches    • Depression    • GERD (gastroesophageal reflux disease)     with spicy food   • Hyperkalemia    • Hyperthyroidism 12/12/2017   • Hypokalemia    • Mild mitral regurgitation    • Mild tricuspid regurgitation    • Mixed hyperlipidemia    • Morbid obesity with BMI of 40.0-44.9, adult (HCC) 06/03/2019   • Osteopenia osteopenia   •  "Post-menopausal bleeding    • Scoliosis    • Sleep apnea     USES CPAP   • Uterine mass     muliple masses, 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   • Visual impairment cataracts developing       Past Surgical History:   Procedure Laterality Date   • COLONOSCOPY     • COLONOSCOPY N/A 2022    Procedure: COLONOSCOPY to terminal ileum with polypectomy (cold bx);  Surgeon: Inder Brand MD;  Location: [facility] ENDOSCOPY;  Service: Gastroenterology;  Laterality: N/A;  pre - family hx colon polyps  post - polyps, hemorrhoids   • EXPLORATORY LAPAROTOMY N/A 2023    Procedure: EXPLORATORY LAPAROTOMY, TOTAL ABDOMINAL HYSTERECTOMY, BILATERAL SALPINGO OOPHORECTOMY WITH STAGING;  Surgeon: Abena Abdalla DO;  Location: [facility] MAIN OR;  Service: Gynecology Oncology;  Laterality: N/A;   • UPPER GASTROINTESTINAL ENDOSCOPY         Social History     Socioeconomic History   • Marital status: Single   Tobacco Use   • Smoking status: Former     Packs/day: 2.00     Years: 26.00     Pack years: 52.00     Types: Cigarettes     Quit date: 1997     Years since quittin.5   • Smokeless tobacco: Never   • Tobacco comments:     caffeine use- tea   Vaping Use   • Vaping Use: Never used   Substance and Sexual Activity   • Alcohol use: No   • Drug use: No   • Sexual activity: Not Currently     Partners: Male     Birth control/protection: Post-menopausal         Procedures       Objective:     Vitals:    23 1515   BP: 98/70   BP Location: Left arm   Patient Position: Sitting   Pulse: 98   SpO2: 98%   Weight: 103 kg (228 lb)   Height: 157.5 cm (62\")   Body mass index is 41.7 kg/m².'        General Appearance:    Alert, cooperative, in no acute distress   Head:    Normocephalic, without obvious abnormality, atraumatic   Eyes:            Lids and lashes normal, conjunctivae and sclerae normal, no   icterus, no pallor, corneas clear, PERRLA   Ears:    Ears appear intact with no abnormalities noted   Throat:   No oral lesions, no thrush, " oral mucosa moist   Neck:   No adenopathy, supple, trachea midline, no thyromegaly, no   carotid bruit, no JVD   Back:     No kyphosis present, no scoliosis present, no skin lesions, erythema or scars, no tenderness to percussion or palpation, range of motion normal   Lungs:     Clear to auscultation,respirations regular, even and unlabored    Heart:    irregular rhythm and normal rate, normal S1 and S2, no murmur, no gallop, no rub, no click   Chest Wall:    No abnormalities observed   Abdomen:     Normal bowel sounds, no masses, no organomegaly, soft        non-tender, non-distended, no guarding, no rebound  tenderness   Extremities:   Moves all extremities well, no edema, no cyanosis, no redness   Pulses:   Pulses palpable and equal bilaterally. Normal radial, carotid, femoral, dorsalis pedis and posterior tibial pulses bilaterally. Normal abdominal aorta   Skin:  Psychiatric:   No bleeding, bruising or rash    Alert and oriented x 3, normal mood and affect     CHADS-VASc Risk Assessment            2 Total Score    1 Age 65-74    1 Sex: Female        Criteria that do not apply:    CHF    Hypertension    Age >/= 75    DM    PRIOR STROKE/TIA/THROMBO    Vascular Disease            Lab Review:             Lab Results   Component Value Date    GLUCOSE 98 02/22/2023    BUN 17 02/22/2023    CREATININE 0.65 02/22/2023    EGFRIFNONA 82 10/08/2021    EGFRIFAFRI 91 02/28/2019    BCR 26.2 (H) 02/22/2023    K 4.3 02/22/2023    CO2 29.5 (H) 02/22/2023    CALCIUM 9.2 02/22/2023    PROTENTOTREF 6.3 02/22/2023    ALBUMIN 4.1 02/22/2023    LABIL2 1.9 02/22/2023    AST 13 02/22/2023    ALT 7 02/22/2023             Assessment:          Diagnosis Plan   1. Chronic atrial fibrillation (HCC)  Adult Transthoracic Echo Complete W/ Cont if Necessary Per Protocol      2. Mixed hyperlipidemia  Adult Transthoracic Echo Complete W/ Cont if Necessary Per Protocol      3. Bilateral lower extremity edema  Adult Transthoracic Echo Complete W/  Cont if Necessary Per Protocol      4. SOB (shortness of breath)  Adult Transthoracic Echo Complete W/ Cont if Necessary Per Protocol             Plan:       1.  A-fib, rate control, continue rate control and chronic anticoagulation  2.  Mixed hyperlipidemia-continue lipid-lowering therapy with atorvastatin, AST ALT reviewed  3.  Cholestatic hepatitis, resolved  4.  Morbid obesity-conts to work on weight loss.  5.  Uterine cancer-evaluation and treatment underway  6.  Lower extremity edema-apparently really had an issue with some swelling right after surgery, this is improved.  I am going to arrange for an echocardiogram to assess biventricular size and function as well as pulmonary arterial pressure.    Thank you very much for allowing us to participate in the care of this pleasant patient.  Please don't hesitate to call if I can be of assistance in any way.      Current Outpatient Medications:   •  acetaminophen (TYLENOL) 500 MG tablet, Take 1 tablet by mouth Every 6 (Six) Hours As Needed for Mild Pain., Disp: , Rfl:   •  apixaban (ELIQUIS) 5 MG tablet tablet, Take 1 tablet by mouth 2 (Two) Times a Day. (Patient taking differently: Take 1 tablet by mouth 2 (Two) Times a Day. HAS NOT STARTED TAKING AT TIME OF PAT APPT), Disp: 180 tablet, Rfl: 3  •  Armodafinil 250 MG tablet, Take 1 tablet by mouth Daily. PLANS TO HOLD FOR 48 HOURS PRIOR TO DOS, Disp: , Rfl:   •  Calcium 500-100 MG-UNIT chewable tablet, Chew 1 tablet Daily., Disp: , Rfl:   •  Cholecalciferol (VITAMIN D) 2000 units capsule, Take 1 capsule by mouth Daily. HOLDING FOR DOS, Disp: , Rfl:   •  clonazePAM (KlonoPIN) 0.5 MG tablet, Take 1 tablet by mouth 2 (Two) Times a Day As Needed for Anxiety., Disp: , Rfl:   •  metoprolol succinate XL (TOPROL-XL) 100 MG 24 hr tablet, Take 1 tablet by mouth Daily., Disp: 90 tablet, Rfl: 2  •  Multiple Vitamins-Minerals (PRESERVISION AREDS 2 PO), Take 1 tablet by mouth Daily. HOLDING FOR DOS, Disp: , Rfl:   •  pramipexole  (MIRAPEX) 0.25 MG tablet, Take 1 tablet by mouth 2 (Two) Times a Day., Disp: , Rfl:   •  topiramate (TOPAMAX) 100 MG tablet, Take 1 tablet by mouth Daily As Needed., Disp: , Rfl:   •  furosemide (Lasix) 20 MG tablet, Take 1 tablet by mouth Daily for 14 days., Disp: 14 tablet, Rfl: 0       No follow-ups on file.

## 2023-04-06 ENCOUNTER — HOSPITAL ENCOUNTER (OUTPATIENT)
Dept: CARDIOLOGY | Facility: HOSPITAL | Age: 68
Discharge: HOME | End: 2023-04-06
Admitting: INTERNAL MEDICINE
Payer: MEDICARE

## 2023-04-06 VITALS
OXYGEN SATURATION: 98 % | SYSTOLIC BLOOD PRESSURE: 120 MMHG | BODY MASS INDEX: 41.96 KG/M2 | WEIGHT: 228 LBS | HEIGHT: 62 IN | HEART RATE: 94 BPM | DIASTOLIC BLOOD PRESSURE: 61 MMHG

## 2023-04-06 DIAGNOSIS — R60.0 BILATERAL LOWER EXTREMITY EDEMA: ICD-10-CM

## 2023-04-06 DIAGNOSIS — R06.02 SOB (SHORTNESS OF BREATH): ICD-10-CM

## 2023-04-06 DIAGNOSIS — I48.20 CHRONIC ATRIAL FIBRILLATION: Chronic | ICD-10-CM

## 2023-04-06 DIAGNOSIS — E78.2 MIXED HYPERLIPIDEMIA: Chronic | ICD-10-CM

## 2023-04-06 LAB
AORTIC ARCH: 2.4 CM
AORTIC DIMENSIONLESS INDEX: 0.8 (DI)
ASCENDING AORTA: 2.7 CM
BH CV ECHO MEAS - ACS: 1.77 CM
BH CV ECHO MEAS - AO MAX PG: 11.2 MMHG
BH CV ECHO MEAS - AO MEAN PG: 6 MMHG
BH CV ECHO MEAS - AO ROOT DIAM: 2.8 CM
BH CV ECHO MEAS - AO V2 MAX: 167.6 CM/SEC
BH CV ECHO MEAS - AO V2 VTI: 34.7 CM
BH CV ECHO MEAS - AVA(I,D): 2.15 CM2
BH CV ECHO MEAS - EDV(CUBED): 92.5 ML
BH CV ECHO MEAS - EDV(MOD-SP2): 77 ML
BH CV ECHO MEAS - EDV(MOD-SP4): 75 ML
BH CV ECHO MEAS - EF(MOD-BP): 62 %
BH CV ECHO MEAS - EF(MOD-SP2): 61 %
BH CV ECHO MEAS - EF(MOD-SP4): 62.7 %
BH CV ECHO MEAS - ESV(CUBED): 28.5 ML
BH CV ECHO MEAS - ESV(MOD-SP2): 30 ML
BH CV ECHO MEAS - ESV(MOD-SP4): 28 ML
BH CV ECHO MEAS - FS: 32.4 %
BH CV ECHO MEAS - IVS/LVPW: 0.95 CM
BH CV ECHO MEAS - IVSD: 0.93 CM
BH CV ECHO MEAS - LAT PEAK E' VEL: 13.5 CM/SEC
BH CV ECHO MEAS - LV DIASTOLIC VOL/BSA (35-75): 37.1 CM2
BH CV ECHO MEAS - LV MASS(C)D: 145.8 GRAMS
BH CV ECHO MEAS - LV MAX PG: 6.1 MMHG
BH CV ECHO MEAS - LV MEAN PG: 2.9 MMHG
BH CV ECHO MEAS - LV SYSTOLIC VOL/BSA (12-30): 13.9 CM2
BH CV ECHO MEAS - LV V1 MAX: 123.1 CM/SEC
BH CV ECHO MEAS - LV V1 VTI: 27.6 CM
BH CV ECHO MEAS - LVIDD: 4.5 CM
BH CV ECHO MEAS - LVIDS: 3.1 CM
BH CV ECHO MEAS - LVOT AREA: 2.7 CM2
BH CV ECHO MEAS - LVOT DIAM: 1.86 CM
BH CV ECHO MEAS - LVPWD: 0.98 CM
BH CV ECHO MEAS - MED PEAK E' VEL: 13.2 CM/SEC
BH CV ECHO MEAS - MV DEC SLOPE: 525.3 CM/SEC2
BH CV ECHO MEAS - MV DEC TIME: 210 MSEC
BH CV ECHO MEAS - MV E MAX VEL: 131 CM/SEC
BH CV ECHO MEAS - MV MAX PG: 7.9 MMHG
BH CV ECHO MEAS - MV MEAN PG: 3 MMHG
BH CV ECHO MEAS - MV P1/2T: 80.7 MSEC
BH CV ECHO MEAS - MV V2 VTI: 35.6 CM
BH CV ECHO MEAS - MVA(P1/2T): 2.7 CM2
BH CV ECHO MEAS - MVA(VTI): 2.1 CM2
BH CV ECHO MEAS - PA ACC TIME: 0.09 SEC
BH CV ECHO MEAS - PA PR(ACCEL): 38.6 MMHG
BH CV ECHO MEAS - PA V2 MAX: 116.7 CM/SEC
BH CV ECHO MEAS - PULM DIAS VEL: 109.5 CM/SEC
BH CV ECHO MEAS - PULM S/D: 0.47
BH CV ECHO MEAS - PULM SYS VEL: 51.9 CM/SEC
BH CV ECHO MEAS - QP/QS: 1.12
BH CV ECHO MEAS - RAP SYSTOLE: 3 MMHG
BH CV ECHO MEAS - RV MAX PG: 2.22 MMHG
BH CV ECHO MEAS - RV V1 MAX: 74.6 CM/SEC
BH CV ECHO MEAS - RV V1 VTI: 15.5 CM
BH CV ECHO MEAS - RVOT DIAM: 2.6 CM
BH CV ECHO MEAS - RVSP: 32 MMHG
BH CV ECHO MEAS - SI(MOD-SP2): 23.3 ML/M2
BH CV ECHO MEAS - SI(MOD-SP4): 23.3 ML/M2
BH CV ECHO MEAS - SUP REN AO DIAM: 3.1 CM
BH CV ECHO MEAS - SV(LVOT): 74.8 ML
BH CV ECHO MEAS - SV(MOD-SP2): 47 ML
BH CV ECHO MEAS - SV(MOD-SP4): 47 ML
BH CV ECHO MEAS - SV(RVOT): 83.7 ML
BH CV ECHO MEAS - TAPSE (>1.6): 2.3 CM
BH CV ECHO MEAS - TR MAX PG: 28.9 MMHG
BH CV ECHO MEAS - TR MAX VEL: 268.6 CM/SEC
BH CV ECHO MEASUREMENTS AVERAGE E/E' RATIO: 9.81
BH CV XLRA - RV BASE: 3.1 CM
BH CV XLRA - RV LENGTH: 6.8 CM
BH CV XLRA - RV MID: 2.48 CM
BH CV XLRA - TDI S': 9.5 CM/SEC
LEFT ATRIUM VOLUME INDEX: 30.4 ML/M2
MAXIMAL PREDICTED HEART RATE: 152 BPM
SINUS: 2.09 CM
STJ: 1.97 CM
STRESS TARGET HR: 129 BPM

## 2023-04-06 PROCEDURE — 93306 TTE W/DOPPLER COMPLETE: CPT | Performed by: INTERNAL MEDICINE

## 2023-04-06 PROCEDURE — 93306 TTE W/DOPPLER COMPLETE: CPT

## 2023-04-07 ENCOUNTER — TELEPHONE (OUTPATIENT)
Dept: CARDIOLOGY | Facility: CLINIC | Age: 68
End: 2023-04-07
Payer: MEDICARE

## 2023-04-07 NOTE — TELEPHONE ENCOUNTER
----- Message from ARYAN Mckeon sent at 4/7/2023  9:24 AM EDT -----  Please let patient know her echocardiogram looks good. She has a little leakiness of her mitral valve, but overall everything looks good.

## 2023-04-07 NOTE — TELEPHONE ENCOUNTER
Bowen Concepcion returned call.    Reviewed results and recommendations with patient.  Patient verbalized understanding of results and recommendations.    Dr. Murdock,    Patient does not currently have a follow up scheduled.  When should patient f/u in office next?    Thank you,  Naya PIPER RN  Triage Nurse [facility]

## 2023-04-10 NOTE — TELEPHONE ENCOUNTER
04.10.23    Called patient and left a voicemail for patient to return call to schedule appointment.   Thanks   Roger

## 2023-04-11 NOTE — TELEPHONE ENCOUNTER
Bowen DOTSON Carlene is scheduled for follow up on 4/15/24.    Thank you,  Naya PIPER RN  Triage Nurse G

## 2023-04-12 ENCOUNTER — RADIATION ONCOLOGY WEEKLY ASSESSMENT (OUTPATIENT)
Dept: RADIATION ONCOLOGY | Facility: HOSPITAL | Age: 68
End: 2023-04-12
Payer: MEDICARE

## 2023-04-12 ENCOUNTER — HOSPITAL ENCOUNTER (OUTPATIENT)
Dept: RADIATION ONCOLOGY | Facility: HOSPITAL | Age: 68
Setting detail: RADIATION/ONCOLOGY SERIES
End: 2023-04-12
Payer: MEDICARE

## 2023-04-12 DIAGNOSIS — C54.1 ENDOMETRIAL ADENOCARCINOMA: Primary | ICD-10-CM

## 2023-04-12 LAB
RAD ONC ARIA COURSE ID: NORMAL
RAD ONC ARIA COURSE INTENT: NORMAL
RAD ONC ARIA COURSE LAST TREATMENT DATE: NORMAL
RAD ONC ARIA COURSE START DATE: NORMAL
RAD ONC ARIA COURSE TREATMENT ELAPSED DAYS: 0
RAD ONC ARIA FIRST TREATMENT DATE: NORMAL
RAD ONC ARIA PLAN FRACTIONS TREATED TO DATE: 1
RAD ONC ARIA PLAN ID: NORMAL
RAD ONC ARIA PLAN NAME: NORMAL
RAD ONC ARIA PLAN PRESCRIBED DOSE PER FRACTION: 6 GY
RAD ONC ARIA PLAN PRIMARY REFERENCE POINT: NORMAL
RAD ONC ARIA PLAN TOTAL FRACTIONS PRESCRIBED: 5
RAD ONC ARIA PLAN TOTAL PRESCRIBED DOSE: 3000 CGY
RAD ONC ARIA REFERENCE POINT DOSAGE GIVEN TO DATE: 6 GY
RAD ONC ARIA REFERENCE POINT DOSAGE GIVEN TO DATE: 6 GY
RAD ONC ARIA REFERENCE POINT ID: NORMAL
RAD ONC ARIA REFERENCE POINT ID: NORMAL
RAD ONC ARIA REFERENCE POINT SESSION DOSAGE GIVEN: 6 GY
RAD ONC ARIA REFERENCE POINT SESSION DOSAGE GIVEN: 6 GY

## 2023-04-12 PROCEDURE — 77770 HDR RDNCL NTRSTL/ICAV BRCHTX: CPT | Performed by: RADIOLOGY

## 2023-04-12 PROCEDURE — C1717 BRACHYTX, NON-STR,HDR IR-192: HCPCS | Performed by: RADIOLOGY

## 2023-04-12 PROCEDURE — 57156 INS VAG BRACHYTX DEVICE: CPT | Performed by: RADIOLOGY

## 2023-04-12 NOTE — PROGRESS NOTES
Subjective     Radiation Oncology Procedure Note  DOS:23  Diagnosis:Endometrial adenocarcinoma    Patient Name: Bowen Concepcion  : 1955  MR:[id number]    Vaginal Cuff Brachytherapy Procedure Note  -Single channel cylinder  -Rx for treating upper 3cm of vaginal apex to cylinder surface depth  -DosincGy per fraction x 5   -Today is fraction #1  -Cylinder segments  2.5cm cylinder dome and adjacent segment, 2.5cm at introitus    Timeout was performed prior to starting the procedure    Patient was brought to the brachytherapy suite, identified and placed in the supine position.    -Segmented vaginal cylinder was created/assembled as noted above and placed in the vaginal canal in the same orientation and depth as at time of CT simulation.  -Patient and room were surveyed prior to treatment    Treatment Parameters:  Dose delivered today:600cGy  Source: Iridium 192  Treatment Time:99.753 secs  Treatment dwells:6  Treatment start time:11:17:49 am  Treatment end time: 11:19:44 am    Dose was delivered as planned without issue.  Patient was surveyed post treatment delivery and the device had retracted as planned.   She tolerated the treatment well with no immediate complications.  The device was removed without issue.  Precautions and instructions were given for the upcoming days.  Patient has been asymptomatic throughout the coarse of treatment.     Patient was given discharge instructions.  She will RTC for her next treament.  She is on pelvic rest until her follow up.    Approved:  Gabi Maxwell MD

## 2023-04-14 ENCOUNTER — RADIATION ONCOLOGY WEEKLY ASSESSMENT (OUTPATIENT)
Dept: RADIATION ONCOLOGY | Facility: HOSPITAL | Age: 68
End: 2023-04-14
Payer: MEDICARE

## 2023-04-14 DIAGNOSIS — C54.1 ENDOMETRIAL ADENOCARCINOMA: Primary | ICD-10-CM

## 2023-04-14 LAB
RAD ONC ARIA COURSE ID: NORMAL
RAD ONC ARIA COURSE INTENT: NORMAL
RAD ONC ARIA COURSE LAST TREATMENT DATE: NORMAL
RAD ONC ARIA COURSE START DATE: NORMAL
RAD ONC ARIA COURSE TREATMENT ELAPSED DAYS: 2
RAD ONC ARIA FIRST TREATMENT DATE: NORMAL
RAD ONC ARIA PLAN FRACTIONS TREATED TO DATE: 2
RAD ONC ARIA PLAN ID: NORMAL
RAD ONC ARIA PLAN NAME: NORMAL
RAD ONC ARIA PLAN PRESCRIBED DOSE PER FRACTION: 6 GY
RAD ONC ARIA PLAN PRIMARY REFERENCE POINT: NORMAL
RAD ONC ARIA PLAN TOTAL FRACTIONS PRESCRIBED: 5
RAD ONC ARIA PLAN TOTAL PRESCRIBED DOSE: 3000 CGY
RAD ONC ARIA REFERENCE POINT DOSAGE GIVEN TO DATE: 12 GY
RAD ONC ARIA REFERENCE POINT DOSAGE GIVEN TO DATE: 12 GY
RAD ONC ARIA REFERENCE POINT ID: NORMAL
RAD ONC ARIA REFERENCE POINT ID: NORMAL
RAD ONC ARIA REFERENCE POINT SESSION DOSAGE GIVEN: 6 GY
RAD ONC ARIA REFERENCE POINT SESSION DOSAGE GIVEN: 6 GY

## 2023-04-14 PROCEDURE — C1717 BRACHYTX, NON-STR,HDR IR-192: HCPCS | Performed by: RADIOLOGY

## 2023-04-14 PROCEDURE — 77770 HDR RDNCL NTRSTL/ICAV BRCHTX: CPT | Performed by: RADIOLOGY

## 2023-04-14 PROCEDURE — 57156 INS VAG BRACHYTX DEVICE: CPT | Performed by: RADIOLOGY

## 2023-04-14 NOTE — PROGRESS NOTES
Subjective     No ref. provider found    Radiation Oncology Procedure Note  DOS:23  Diagnosis:endometrial adenocarcinoma    Patient Name: Bowen Concepcion  : 1955  MR:[id number]      Vaginal Cuff Brachytherapy Procedure Note  -Single channel cylinder  -Rx for treating upper 3cm of vaginal apex to cylinder surface depth  -DosincGy/fx x 5 fractions   -Today is fraction #2/5  -Cylinder segments  2.5cm cylinder dome and adjacent segment, 2.5cm at introitus    Timeout was performed prior to starting the procedure    Patient was brought to the brachytherapy suite, identified and placed in the supine position.    -Segmented vaginal cylinder was created/assembled as noted above and placed in the vaginal canal in the same orientation and depth as at time of CT simulation.  -Patient and room were surveyed prior to treatment    Treatment Parameters  Dose delivered today:600cGy  Source:Iridium 192  Treatment Time:101.645s  Treatment dwells:6  Treatment start time:10:44:27  Treatment end time:10:46:24    Dose was delivered as planned without issue.  Patient was surveyed post treatment delivery and the device had retracted as planned.   She tolerated the treatment well with no immediate complications.  The device was removed without issue.  Precautions and instructions were given for the upcoming days.  Patient has been asymptomatic throughout the coarse of treatment.     Patient was given discharge instructions.  She will RTC for her next treament.  She is on pelvic rest until her follow up.    Approved:  Gabi Maxwell MD

## 2023-04-17 ENCOUNTER — RADIATION ONCOLOGY WEEKLY ASSESSMENT (OUTPATIENT)
Dept: RADIATION ONCOLOGY | Facility: HOSPITAL | Age: 68
End: 2023-04-17
Payer: MEDICARE

## 2023-04-17 DIAGNOSIS — C54.1 ENDOMETRIAL ADENOCARCINOMA: Primary | ICD-10-CM

## 2023-04-17 LAB
RAD ONC ARIA COURSE ID: NORMAL
RAD ONC ARIA COURSE INTENT: NORMAL
RAD ONC ARIA COURSE LAST TREATMENT DATE: NORMAL
RAD ONC ARIA COURSE START DATE: NORMAL
RAD ONC ARIA COURSE TREATMENT ELAPSED DAYS: 5
RAD ONC ARIA FIRST TREATMENT DATE: NORMAL
RAD ONC ARIA PLAN FRACTIONS TREATED TO DATE: 3
RAD ONC ARIA PLAN ID: NORMAL
RAD ONC ARIA PLAN NAME: NORMAL
RAD ONC ARIA PLAN PRESCRIBED DOSE PER FRACTION: 6 GY
RAD ONC ARIA PLAN PRIMARY REFERENCE POINT: NORMAL
RAD ONC ARIA PLAN TOTAL FRACTIONS PRESCRIBED: 5
RAD ONC ARIA PLAN TOTAL PRESCRIBED DOSE: 3000 CGY
RAD ONC ARIA REFERENCE POINT DOSAGE GIVEN TO DATE: 17.99 GY
RAD ONC ARIA REFERENCE POINT DOSAGE GIVEN TO DATE: 18 GY
RAD ONC ARIA REFERENCE POINT ID: NORMAL
RAD ONC ARIA REFERENCE POINT ID: NORMAL
RAD ONC ARIA REFERENCE POINT SESSION DOSAGE GIVEN: 6 GY
RAD ONC ARIA REFERENCE POINT SESSION DOSAGE GIVEN: 6 GY

## 2023-04-17 PROCEDURE — C1717 BRACHYTX, NON-STR,HDR IR-192: HCPCS | Performed by: RADIOLOGY

## 2023-04-17 PROCEDURE — 57156 INS VAG BRACHYTX DEVICE: CPT | Performed by: RADIOLOGY

## 2023-04-17 PROCEDURE — 77770 HDR RDNCL NTRSTL/ICAV BRCHTX: CPT | Performed by: RADIOLOGY

## 2023-04-17 NOTE — PROGRESS NOTES
Radiation Oncology Procedure Note  DOS:23  Diagnosis:Endometrial adenocarcinoma    Patient Name: Bowen Concepcion  :1955  MR:[id number]    Vaginal Cuff Brachytherapy Procedure Note  -Single channel cylinder  -Rx for treating upper 3cm of vaginal apex to cylinder surface depth  -DosincGy/fx x 5  -Today is fraction #3/5  -Cylinder segments  2.5cm cylinder dome and adjacent segment, 2.5cm at introitus    Timeout was performed prior to starting the procedure    Patient was brought to the brachytherapy suite, identified and placed in the supine position.    -Segmented vaginal cylinder was created/assembled as noted above and placed in the vaginal canal in the same orientation and depth as at time of CT simulation.  -Patient and room were surveyed prior to treatment    Treatment Parameters  Dose delivered today: 600cGy  Source: iridium 192  Treatment Time: 104.546 secs  Treatment dwells:6  Treatment start time:10:28:37  Treatment end time: 10:30:37    Dose was delivered as planned without issue.  Patient was surveyed post treatment delivery and the device had retracted as planned.   She tolerated the treatment well with no immediate complications.  The device was removed without issue.  Precautions and instructions were given for the upcoming days.  Patient has been asymptomatic throughout the coarse of treatment.     Patient was given discharge instructions.  She will RTC for her next treament.  She is on pelvic rest until her follow up.    Approved:  Gabi Maxwell MD      Subjective     Gabi Maxwell MD    Cancer Staging   No matching staging information was found for the patient.    I am writing to let you know   has recently completed the radiation therapy portion of treatment for cancer.  Her treatment course is summarized below:    Site Treated:         Dates Of Treatment:      Total Dose and Treatment Technique:     a total dose of     Patient Tolerance and Response to Treatment:       tolerated her  treatments well.   had no breaks in the course of treatment.   also developed mild to moderate fatigue near the completion        Status of Tumor/Patient at Completion of Therapy:      Disposition:  Follow up 4 weeks or sooner if necessary.  I have also placed a referral to the Survivorship clinic.

## 2023-04-19 ENCOUNTER — RADIATION ONCOLOGY WEEKLY ASSESSMENT (OUTPATIENT)
Dept: RADIATION ONCOLOGY | Facility: HOSPITAL | Age: 68
End: 2023-04-19
Payer: MEDICARE

## 2023-04-19 DIAGNOSIS — C54.1 ENDOMETRIAL ADENOCARCINOMA: Primary | ICD-10-CM

## 2023-04-19 LAB
RAD ONC ARIA COURSE ID: NORMAL
RAD ONC ARIA COURSE INTENT: NORMAL
RAD ONC ARIA COURSE LAST TREATMENT DATE: NORMAL
RAD ONC ARIA COURSE START DATE: NORMAL
RAD ONC ARIA COURSE TREATMENT ELAPSED DAYS: 7
RAD ONC ARIA FIRST TREATMENT DATE: NORMAL
RAD ONC ARIA PLAN FRACTIONS TREATED TO DATE: 4
RAD ONC ARIA PLAN ID: NORMAL
RAD ONC ARIA PLAN NAME: NORMAL
RAD ONC ARIA PLAN PRESCRIBED DOSE PER FRACTION: 6 GY
RAD ONC ARIA PLAN PRIMARY REFERENCE POINT: NORMAL
RAD ONC ARIA PLAN TOTAL FRACTIONS PRESCRIBED: 5
RAD ONC ARIA PLAN TOTAL PRESCRIBED DOSE: 3000 CGY
RAD ONC ARIA REFERENCE POINT DOSAGE GIVEN TO DATE: 23.99 GY
RAD ONC ARIA REFERENCE POINT DOSAGE GIVEN TO DATE: 24 GY
RAD ONC ARIA REFERENCE POINT ID: NORMAL
RAD ONC ARIA REFERENCE POINT ID: NORMAL
RAD ONC ARIA REFERENCE POINT SESSION DOSAGE GIVEN: 6 GY
RAD ONC ARIA REFERENCE POINT SESSION DOSAGE GIVEN: 6 GY

## 2023-04-19 PROCEDURE — 57156 INS VAG BRACHYTX DEVICE: CPT | Performed by: RADIOLOGY

## 2023-04-19 PROCEDURE — 77770 HDR RDNCL NTRSTL/ICAV BRCHTX: CPT | Performed by: RADIOLOGY

## 2023-04-19 PROCEDURE — C1717 BRACHYTX, NON-STR,HDR IR-192: HCPCS | Performed by: RADIOLOGY

## 2023-04-19 NOTE — PROGRESS NOTES
Radiation Oncology Procedure Note  DOS:23  Diagnosis:Endometrial adenocarcinoma    Patient Name: Bowen Concepcion  : 1955  MR:[id number]    Vaginal Cuff Brachytherapy Procedure Note  -Single channel cylinder  -Rx for treating upper 3cm of vaginal apex to cylinder surface depth  -DosincGy/fx  -Today is fraction #4  -Cylinder segments  2.5cm cylinder dome and adjacent segment, 2.5cm at introitus    Timeout was performed prior to starting the procedure    Patient was brought to the brachytherapy suite, identified and placed in the supine position.    -Segmented vaginal cylinder was created/assembled as noted above and placed in the vaginal canal in the same orientation and depth as at time of CT simulation.  -Patient and room were surveyed prior to treatment    Treatment Parameters  Dose delivered today:600cGy  Source:Iridium 192  Treatment Time:106.529secs  Treatment dwells:6  Treatment start time:10:29:50  Treatment end time:10:31:52    Dose was delivered as planned without issue.  Patient was surveyed post treatment delivery and the device had retracted as planned.   She tolerated the treatment well with no immediate complications.  The device was removed without issue.  Precautions and instructions were given for the upcoming days.  Patient has been asymptomatic throughout the coarse of treatment.     Patient was given discharge instructions.  She will RTC for her next treament.  She is on pelvic rest until her follow up.    Approved:  Gabi Maxwell MD      Subjective     No ref. provider found    Cancer Staging   No matching staging information was found for the patient.    I am writing to let you know   has recently completed the radiation therapy portion of treatment for cancer.  Her treatment course is summarized below:    Site Treated:         Dates Of Treatment:      Total Dose and Treatment Technique:     a total dose of     Patient Tolerance and Response to Treatment:      tolerated  her  treatments well.   had no breaks in the course of treatment.   also developed mild to moderate fatigue near the completion        Status of Tumor/Patient at Completion of Therapy:      Disposition:  Follow up 4 weeks or sooner if necessary.  I have also placed a referral to the Survivorship clinic.

## 2023-04-21 ENCOUNTER — RADIATION ONCOLOGY WEEKLY ASSESSMENT (OUTPATIENT)
Dept: RADIATION ONCOLOGY | Facility: HOSPITAL | Age: 68
End: 2023-04-21
Payer: MEDICARE

## 2023-04-21 DIAGNOSIS — C54.1 ENDOMETRIAL ADENOCARCINOMA: Primary | ICD-10-CM

## 2023-04-21 LAB
RAD ONC ARIA COURSE END DATE: NORMAL
RAD ONC ARIA COURSE ID: NORMAL
RAD ONC ARIA COURSE ID: NORMAL
RAD ONC ARIA COURSE INTENT: NORMAL
RAD ONC ARIA COURSE INTENT: NORMAL
RAD ONC ARIA COURSE LAST TREATMENT DATE: NORMAL
RAD ONC ARIA COURSE LAST TREATMENT DATE: NORMAL
RAD ONC ARIA COURSE START DATE: NORMAL
RAD ONC ARIA COURSE START DATE: NORMAL
RAD ONC ARIA COURSE TREATMENT ELAPSED DAYS: 9
RAD ONC ARIA COURSE TREATMENT ELAPSED DAYS: 9
RAD ONC ARIA FIRST TREATMENT DATE: NORMAL
RAD ONC ARIA FIRST TREATMENT DATE: NORMAL
RAD ONC ARIA PLAN FRACTIONS TREATED TO DATE: 5
RAD ONC ARIA PLAN FRACTIONS TREATED TO DATE: 5
RAD ONC ARIA PLAN ID: NORMAL
RAD ONC ARIA PLAN ID: NORMAL
RAD ONC ARIA PLAN NAME: NORMAL
RAD ONC ARIA PLAN NAME: NORMAL
RAD ONC ARIA PLAN PRESCRIBED DOSE PER FRACTION: 6 GY
RAD ONC ARIA PLAN PRESCRIBED DOSE PER FRACTION: 6 GY
RAD ONC ARIA PLAN PRIMARY REFERENCE POINT: NORMAL
RAD ONC ARIA PLAN PRIMARY REFERENCE POINT: NORMAL
RAD ONC ARIA PLAN TOTAL FRACTIONS PRESCRIBED: 5
RAD ONC ARIA PLAN TOTAL FRACTIONS PRESCRIBED: 5
RAD ONC ARIA PLAN TOTAL PRESCRIBED DOSE: 3000 CGY
RAD ONC ARIA PLAN TOTAL PRESCRIBED DOSE: 3000 CGY
RAD ONC ARIA REFERENCE POINT DOSAGE GIVEN TO DATE: 29.99 GY
RAD ONC ARIA REFERENCE POINT DOSAGE GIVEN TO DATE: 29.99 GY
RAD ONC ARIA REFERENCE POINT DOSAGE GIVEN TO DATE: 30 GY
RAD ONC ARIA REFERENCE POINT DOSAGE GIVEN TO DATE: 30 GY
RAD ONC ARIA REFERENCE POINT ID: NORMAL
RAD ONC ARIA REFERENCE POINT SESSION DOSAGE GIVEN: 6 GY
RAD ONC ARIA REFERENCE POINT SESSION DOSAGE GIVEN: 6 GY

## 2023-04-21 PROCEDURE — C1717 BRACHYTX, NON-STR,HDR IR-192: HCPCS | Performed by: RADIOLOGY

## 2023-04-21 PROCEDURE — 77770 HDR RDNCL NTRSTL/ICAV BRCHTX: CPT | Performed by: RADIOLOGY

## 2023-04-21 PROCEDURE — 57156 INS VAG BRACHYTX DEVICE: CPT | Performed by: RADIOLOGY

## 2023-04-21 PROCEDURE — 77336 RADIATION PHYSICS CONSULT: CPT | Performed by: RADIOLOGY

## 2023-04-21 NOTE — PROGRESS NOTES
Radiation Oncology Procedure Note  DOS:23  Diagnosis:Endometrial adenocarcinoma    Patient Name:Bowen Concepcion  : 1955  MR:[id number]    Vaginal Cuff Brachytherapy Procedure Note  -Single channel cylinder  -Rx for treating upper 3cm of vaginal apex to cylinder surface depth  -DosincGy/fx  -Today is fraction #5  -Cylinder segments  2.5cm cylinder dome and adjacent segment, 2.5cm at introitus    Timeout was performed prior to starting the procedure    Patient was brought to the brachytherapy suite, identified and placed in the supine position.    -Segmented vaginal cylinder was created/assembled as noted above and placed in the vaginal canal in the same orientation and depth as at time of CT simulation.  -Patient and room were surveyed prior to treatment    Treatment Parameters  Dose delivered today:600cGy  Source:Iriidum 195  Treatment Time:108.546s  Treatment dwells:6  Treatment start time:10:34:21  Treatment end time:10:36:24    Dose was delivered as planned without issue.  Patient was surveyed post treatment delivery and the device had retracted as planned.   She tolerated the treatment well with no immediate complications.  The device was removed without issue.  Precautions and instructions were given for the upcoming days.  Patient has been asymptomatic throughout the coarse of treatment.     Patient was given a vaginal dilator and discharge instructions.  She will RTC in 4 weeks for follow up[ She is on pelvic rest until her follow up.    Approved:  Gabi Maxwell MD      Subjective     No ref. provider found    Cancer Staging   No matching staging information was found for the patient.    I am writing to let you know   has recently completed the radiation therapy portion of treatment for cancer.  Her treatment course is summarized below:    Site Treated:         Dates Of Treatment:      Total Dose and Treatment Technique:     a total dose of     Patient Tolerance and Response to  Treatment:      tolerated her  treatments well.   had no breaks in the course of treatment.   also developed mild to moderate fatigue near the completion        Status of Tumor/Patient at Completion of Therapy:      Disposition:  Follow up 4 weeks or sooner if necessary.  I have also placed a referral to the Survivorship clinic.

## 2023-05-08 DIAGNOSIS — I48.20 CHRONIC ATRIAL FIBRILLATION: ICD-10-CM

## 2023-06-06 ENCOUNTER — TELEPHONE (OUTPATIENT)
Dept: RADIATION ONCOLOGY | Facility: HOSPITAL | Age: 68
End: 2023-06-06
Payer: MEDICARE

## 2023-06-07 ENCOUNTER — OFFICE VISIT (OUTPATIENT)
Dept: RADIATION ONCOLOGY | Facility: HOSPITAL | Age: 68
End: 2023-06-07
Payer: MEDICARE

## 2023-06-07 VITALS
DIASTOLIC BLOOD PRESSURE: 75 MMHG | OXYGEN SATURATION: 98 % | WEIGHT: 237.2 LBS | BODY MASS INDEX: 43.38 KG/M2 | HEART RATE: 92 BPM | SYSTOLIC BLOOD PRESSURE: 113 MMHG

## 2023-06-07 DIAGNOSIS — C54.1 ENDOMETRIAL ADENOCARCINOMA: Primary | ICD-10-CM

## 2023-06-07 PROCEDURE — G0463 HOSPITAL OUTPT CLINIC VISIT: HCPCS | Performed by: RADIOLOGY

## 2023-06-07 NOTE — PROGRESS NOTES
Subjective     No ref. provider found     Cancer Staging      CC: 6 week follow up for FIGO IA grade 3 endometrial adenocarcinoma                      S:  I had the pleasure of seeing Bowen Concepcion  [facility].  She is a pleasant 67 year old female with FIGO IA grade 3 endometrial adenocarcinoma s/p radical hysterectomy.  She returns today for follow up now 6 weeks out from completion of her vaginal cuff brachytherapy.  She completed treatment on 4/21/23, 3000cGy in 5 fractions. She has been doing well since I last saw her.  She denies any vaginal bleeding, dysuria, or diarrhea.  Her only side effect was some mild fatigue for 1-2 weeks after completion of radiation but his has resolved.     Review of Systems   Constitutional: Negative.    Gastrointestinal: Negative.    Genitourinary: Negative.    Psychiatric/Behavioral: Negative.         Past Medical History:   Diagnosis Date    Abnormal weight loss     Anxiety     Arthritis     OSTEOARTHRITIS RIGHT KNEE AND INDEX FINGERS    Bipolar disorder     Cancer 2023    UTERINE CANCER    Cataract 2015    developing    Chronic atrial fibrillation 12/09/2017    Chronic headaches     Depression     GERD (gastroesophageal reflux disease)     with spicy food    Hyperkalemia     Hyperthyroidism 12/12/2017    Hypokalemia     Mild mitral regurgitation     Mild tricuspid regurgitation     Mixed hyperlipidemia     Morbid obesity with BMI of 40.0-44.9, adult 06/03/2019    Osteopenia osteopenia    Post-menopausal bleeding     Scoliosis     Sleep apnea     USES CPAP    Uterine mass     muliple masses, 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    Visual impairment cataracts developing         Past Surgical History:   Procedure Laterality Date    COLONOSCOPY  2005    COLONOSCOPY N/A 06/02/2022    Procedure: COLONOSCOPY to terminal ileum with polypectomy (cold bx);  Surgeon: Inder Brand MD;  Location: [facility] ENDOSCOPY;  Service: Gastroenterology;  Laterality: N/A;  pre - family hx colon  polyps  post - polyps, hemorrhoids    EXPLORATORY LAPAROTOMY N/A 2023    Procedure: EXPLORATORY LAPAROTOMY, TOTAL ABDOMINAL HYSTERECTOMY, BILATERAL SALPINGO OOPHORECTOMY WITH STAGING;  Surgeon: Abena Abdalla DO;  Location: [facility] OR;  Service: Gynecology Oncology;  Laterality: N/A;    UPPER GASTROINTESTINAL ENDOSCOPY           Social History     Socioeconomic History    Marital status: Single   Tobacco Use    Smoking status: Former     Packs/day: 2.00     Years: 26.00     Pack years: 52.00     Types: Cigarettes     Quit date: 1997     Years since quittin.7    Smokeless tobacco: Never    Tobacco comments:     caffeine use- tea   Vaping Use    Vaping Use: Never used   Substance and Sexual Activity    Alcohol use: No    Drug use: No    Sexual activity: Not Currently     Partners: Male     Birth control/protection: Post-menopausal         Family History   Problem Relation Age of Onset    Heart disease Mother     Hypertension Mother     Depression Mother     Miscarriages / Stillbirths Mother     Cancer Father     Depression Sister     Cancer Sister     Depression Sister     Depression Brother     Colon polyps Brother     Atrial fibrillation Brother     Depression Brother     Depression Brother     Breast cancer Maternal Aunt     Breast cancer Maternal Aunt     Vision loss Maternal Grandmother     Arthritis Maternal Grandmother     Vision loss Paternal Grandmother     Diabetes Paternal Grandfather     Ovarian cancer Neg Hx     Malig Hyperthermia Neg Hx           Objective    Physical Exam  Constitutional:       Appearance: She is obese.   Musculoskeletal:         General: Normal range of motion.   Psychiatric:         Mood and Affect: Mood normal.         Current Outpatient Medications on File Prior to Visit   Medication Sig Dispense Refill    acetaminophen (TYLENOL) 500 MG tablet Take 1 tablet by mouth Every 6 (Six) Hours As Needed for Mild Pain.      apixaban (ELIQUIS) 5 MG tablet tablet  Take 1 tablet by mouth 2 (Two) Times a Day. 180 tablet 3    Armodafinil 250 MG tablet Take 1 tablet by mouth Daily. PLANS TO HOLD FOR 48 HOURS PRIOR TO DOS      Calcium 500-100 MG-UNIT chewable tablet Chew 1 tablet Daily.      Cholecalciferol (VITAMIN D) 2000 units capsule Take 1 capsule by mouth Daily. HOLDING FOR DOS      clonazePAM (KlonoPIN) 0.5 MG tablet Take 1 tablet by mouth 2 (Two) Times a Day As Needed for Anxiety.      furosemide (Lasix) 20 MG tablet Take 1 tablet by mouth Daily for 14 days. 14 tablet 0    metoprolol succinate XL (TOPROL-XL) 100 MG 24 hr tablet Take 1 tablet by mouth Daily. 90 tablet 2    Multiple Vitamins-Minerals (PRESERVISION AREDS 2 PO) Take 1 tablet by mouth Daily. HOLDING FOR DOS      pramipexole (MIRAPEX) 0.25 MG tablet Take 1 tablet by mouth 2 (Two) Times a Day.      topiramate (TOPAMAX) 100 MG tablet Take 1 tablet by mouth Daily As Needed.       No current facility-administered medications on file prior to visit.       ALLERGIES:    Allergies   Allergen Reactions    Desyrel [Trazodone] Nausea And Vomiting    Modafinil Other (See Comments)     Those manufactured in [place].  Weight gain, cough       There were no vitals taken for this visit.         2/6/2023     3:19 PM   Current Status   ECOG score 0         Assessment & Plan     67 year old female with FIGO IA grade 3 endometrial adenocarcinoma now 6 weeks out from vaginal cuff brachtherapy and doing well.  In accordance with NCCN guidelines she will need a physical exam every 3-6 months for 2-3 years, then physical exam every 6-12 months for years 3-5.  We will schedule follow up with Dr. Abdalla, her gyn onc.   I will see her back in 6 months for follow up or sooner if necessary. .               Thank you very much for allowing me to participate in the care of this very pleasant patient.    Sincerely,      Gabi Maxwell MD     Subjective

## 2023-06-16 ENCOUNTER — TELEPHONE (OUTPATIENT)
Dept: GYNECOLOGIC ONCOLOGY | Facility: CLINIC | Age: 68
End: 2023-06-16
Payer: MEDICARE

## 2023-06-16 NOTE — TELEPHONE ENCOUNTER
Caller: Bowen Concepcion    Relationship to patient: Self    Best call back number: [phone]    Chief complaint: PATIENT CALLED TO SEE WHEN SHE NEEDED TO FOLLOW UP

## 2023-06-16 NOTE — PROGRESS NOTES
Physical Therapy Daily Progress Note    Patient: Bowen Concepcion   : 1955  Diagnosis/ICD-10 Code:  Acute pain of right knee [M25.561]  Referring practitioner: ARYAN Ruth  Date of Initial Visit: Type: THERAPY  Noted: 2022  Today's Date: 2022  Patient seen for 10 sessions         Bowen Concepcion reports:       Subjective   Pt reports that she has noticed in the last week that her other knee on the inside has started bothering her, like a dull ache.   Objective   See Exercise, Manual, and Modality Logs for complete treatment.       Assessment/Plan  No adverse reactions to today's intervention. Exercise progression with blue theraband desistance, and she tolerated this well without c/o knee pain. Pt I progressing well towards goals and is improving her BLE strength. Continue POC.   Progress per Plan of Care           Manual Therapy:    0     mins  18318;  Therapeutic Exercise:    25     mins  99940;     Neuromuscular Liam:    0    mins  74369;    Therapeutic Activity:     13     mins  31932;     Gait Trainin     mins  37373;     Ultrasound:     0     mins  33068;    Electrical Stimulation:    0     mins  30972 ( );  Dry Needling     0     mins self-pay    Timed Treatment:   38   mins   Total Treatment:     38   mins    Sherrie Willoughby PT   [place] Lic #[id number]  Physical Therapist                    
normal

## 2023-06-19 NOTE — TELEPHONE ENCOUNTER
Called patient to schedule follow up in in August 2023. No answer. LVM for patient to call office back to schedule.

## 2023-08-02 ENCOUNTER — OFFICE VISIT (OUTPATIENT)
Dept: GYNECOLOGIC ONCOLOGY | Facility: CLINIC | Age: 68
End: 2023-08-02
Payer: MEDICARE

## 2023-08-02 VITALS
HEIGHT: 62 IN | SYSTOLIC BLOOD PRESSURE: 134 MMHG | WEIGHT: 243.6 LBS | DIASTOLIC BLOOD PRESSURE: 96 MMHG | HEART RATE: 89 BPM | OXYGEN SATURATION: 98 % | TEMPERATURE: 99.5 F | RESPIRATION RATE: 16 BRPM | BODY MASS INDEX: 44.83 KG/M2

## 2023-08-02 DIAGNOSIS — N64.89 OTHER SPECIFIED DISORDERS OF BREAST: ICD-10-CM

## 2023-08-02 DIAGNOSIS — C54.1 ENDOMETRIAL CARCINOMA: Primary | ICD-10-CM

## 2023-08-02 DIAGNOSIS — Z12.39 SCREENING BREAST EXAMINATION: ICD-10-CM

## 2023-08-09 ENCOUNTER — TRANSCRIBE ORDERS (OUTPATIENT)
Dept: ADMINISTRATIVE | Facility: HOSPITAL | Age: 68
End: 2023-08-09
Payer: MEDICARE

## 2023-08-09 DIAGNOSIS — Z12.31 VISIT FOR SCREENING MAMMOGRAM: Primary | ICD-10-CM

## 2023-08-09 DIAGNOSIS — I48.20 CHRONIC ATRIAL FIBRILLATION: Chronic | ICD-10-CM

## 2023-08-09 RX ORDER — METOPROLOL SUCCINATE 100 MG/1
TABLET, EXTENDED RELEASE ORAL
Qty: 90 TABLET | Refills: 2 | Status: SHIPPED | OUTPATIENT
Start: 2023-08-09

## 2023-09-19 ENCOUNTER — TELEPHONE (OUTPATIENT)
Dept: GYNECOLOGIC ONCOLOGY | Facility: CLINIC | Age: 68
End: 2023-09-19
Payer: MEDICARE

## 2023-09-19 ENCOUNTER — HOSPITAL ENCOUNTER (OUTPATIENT)
Dept: CT IMAGING | Facility: HOSPITAL | Age: 68
Discharge: HOME | End: 2023-09-19
Payer: MEDICARE

## 2023-09-19 ENCOUNTER — HOSPITAL ENCOUNTER (OUTPATIENT)
Dept: MAMMOGRAPHY | Facility: HOSPITAL | Age: 68
Discharge: HOME | End: 2023-09-19
Payer: MEDICARE

## 2023-09-19 DIAGNOSIS — C54.1 ENDOMETRIAL CARCINOMA: ICD-10-CM

## 2023-09-19 DIAGNOSIS — C54.1 ENDOMETRIAL ADENOCARCINOMA: Primary | ICD-10-CM

## 2023-09-19 DIAGNOSIS — Z12.31 VISIT FOR SCREENING MAMMOGRAM: ICD-10-CM

## 2023-09-19 LAB — CREAT BLDA-MCNC: 0.6 MG/DL (ref 0.6–1.3)

## 2023-09-19 PROCEDURE — 25510000001 IOPAMIDOL 61 % SOLUTION: Performed by: OBSTETRICS & GYNECOLOGY

## 2023-09-19 PROCEDURE — 74177 CT ABD & PELVIS W/CONTRAST: CPT

## 2023-09-19 PROCEDURE — 82565 ASSAY OF CREATININE: CPT

## 2023-09-19 PROCEDURE — 77067 SCR MAMMO BI INCL CAD: CPT

## 2023-09-19 PROCEDURE — 77063 BREAST TOMOSYNTHESIS BI: CPT

## 2023-09-19 RX ADMIN — IOPAMIDOL 85 ML: 612 INJECTION, SOLUTION INTRAVENOUS at 11:20

## 2023-09-19 NOTE — TELEPHONE ENCOUNTER
Patient called in returning call from [facility].    Spoke with patient to discuss options to transfer care to an external Gyn Onc provider due to Dr. Abdalla no longer being with [facility] as of 9/1/23. Offered the following options- [facility] or [facility]. Explained that it is their choice and we will help to facilitate this transition to ensure minimal disruption to their care. Patient prefers to go to [facility]. I explained next steps for referring to new provider and that we would coordinate with that practice to schedule. Patient confirmed understanding.

## 2023-09-20 DIAGNOSIS — C54.1 ENDOMETRIAL ADENOCARCINOMA: Primary | ICD-10-CM

## 2023-09-21 ENCOUNTER — TELEPHONE (OUTPATIENT)
Dept: GYNECOLOGIC ONCOLOGY | Facility: CLINIC | Age: 68
End: 2023-09-21
Payer: MEDICARE

## 2023-09-21 ENCOUNTER — TELEPHONE (OUTPATIENT)
Dept: GYNECOLOGIC ONCOLOGY | Facility: CLINIC | Age: 68
End: 2023-09-21

## 2023-09-21 NOTE — TELEPHONE ENCOUNTER
Caller: Bowen Concepcion NILA    Relationship: Self    Best call back number: [phone]     What is the best time to reach you: ASAP    What was the call regarding: PT SAYS SHE WAS RETURNING CALL FROM [facility].  THERE WAS AN ENCOUNTER FROM СВЕТЛАНА STATING PT WAS TO CALL THE OFFICE TO BE NOTIFIED ABOUT HER UPCOMING APPT AT .  [facility] S/W NOLVIA, AND WAS INSTRUCTED TO GIVE PT THE DATE/TIME FOR HER SCHEDULED APPT WITH [facility] RELAYED THIS INFORMATION, PT V/U.

## 2023-09-21 NOTE — TELEPHONE ENCOUNTER
Left message for patient to call our office.    We want to inform her of upcoming appointment with gyn/onc @ [facility]    Scheduled with Dr. Jade 11/02 @ 10:30AM   [phone]

## 2023-12-01 ENCOUNTER — TELEPHONE (OUTPATIENT)
Dept: RADIATION ONCOLOGY | Facility: HOSPITAL | Age: 68
End: 2023-12-01
Payer: MEDICARE

## 2023-12-01 NOTE — TELEPHONE ENCOUNTER
Patient sent message via my chart to cancel appointment. Placed call to reschedule appointment, patient did not answer, left voicemail.

## 2023-12-12 ENCOUNTER — TELEPHONE (OUTPATIENT)
Dept: RADIATION ONCOLOGY | Facility: HOSPITAL | Age: 68
End: 2023-12-12
Payer: MEDICARE

## 2023-12-12 NOTE — TELEPHONE ENCOUNTER
Placed call to patient in regards to rescheduling her 6 month follow up appointment with Dr. Maxwell. Patient did not answer, left voicemail.

## 2023-12-14 ENCOUNTER — TELEPHONE (OUTPATIENT)
Dept: RADIATION ONCOLOGY | Facility: HOSPITAL | Age: 68
End: 2023-12-14
Payer: MEDICARE

## 2023-12-14 NOTE — TELEPHONE ENCOUNTER
Third attempt to call patient about rescheduling her follow up appointment with Dr. Maxwell. Patient did not answer, left voicemail to call back office.

## 2024-01-02 ENCOUNTER — TELEPHONE (OUTPATIENT)
Dept: RADIATION ONCOLOGY | Facility: HOSPITAL | Age: 69
End: 2024-01-02
Payer: MEDICARE

## 2024-01-16 ENCOUNTER — TELEPHONE (OUTPATIENT)
Dept: RADIATION ONCOLOGY | Facility: HOSPITAL | Age: 69
End: 2024-01-16
Payer: MEDICARE

## 2024-01-17 ENCOUNTER — OFFICE VISIT (OUTPATIENT)
Dept: RADIATION ONCOLOGY | Facility: HOSPITAL | Age: 69
End: 2024-01-17
Payer: MEDICARE

## 2024-01-17 VITALS
BODY MASS INDEX: 44.45 KG/M2 | OXYGEN SATURATION: 98 % | HEART RATE: 90 BPM | DIASTOLIC BLOOD PRESSURE: 88 MMHG | SYSTOLIC BLOOD PRESSURE: 151 MMHG | WEIGHT: 243 LBS

## 2024-01-17 DIAGNOSIS — C54.1 ENDOMETRIAL ADENOCARCINOMA: Primary | ICD-10-CM

## 2024-01-17 PROCEDURE — G0463 HOSPITAL OUTPT CLINIC VISIT: HCPCS | Performed by: RADIOLOGY

## 2024-01-17 NOTE — PROGRESS NOTES
Subjective     No ref. provider found     Cancer Staging   CC:  9 months follow up for FIGO IA grade 3 endometrial adenocarcinoma                       S:  I had the pleasure of seeing Bowen Concepcion  today [facility].  She is a pleasant 67 year old female with FIGO IA grade 3 endometrial adenocarcinoma s/p radical hysterectomy.  She returns today for follow up now 9 months out from completion of her vaginal cuff brachytherapy.  She completed treatment on 4/21/23, 3000cGy in 5 fractions. She has been doing well since I last saw her.  She denies any vaginal bleeding, dysuria, or diarrhea.  Her only side effect was some mild fatigue for 1-2 weeks after completion of radiation but his has resolved.     She saw Dr. Jade with  gyn/onc last month and pelvic exam which she states was normal.         Review of Systems   Constitutional: Negative.    Gastrointestinal: Negative.    Genitourinary: Negative.    Musculoskeletal:  Positive for arthralgias and gait problem.   Psychiatric/Behavioral: Negative.         Past Medical History:   Diagnosis Date    Abnormal weight loss     Anxiety     Arthritis     OSTEOARTHRITIS RIGHT KNEE AND INDEX FINGERS    Bipolar disorder     Cancer 2023    UTERINE CANCER    Cataract 2015    developing    Chronic atrial fibrillation 12/09/2017    Chronic headaches     Depression     GERD (gastroesophageal reflux disease)     with spicy food    Hx of radiation therapy     Hyperkalemia     Hyperthyroidism 12/12/2017    Hypokalemia     Mild mitral regurgitation     Mild tricuspid regurgitation     Mixed hyperlipidemia     Morbid obesity with BMI of 40.0-44.9, adult 06/03/2019    Osteopenia osteopenia    Post-menopausal bleeding     Scoliosis     Sleep apnea     USES CPAP    Uterine mass     muliple masses, 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    Visual impairment cataracts developing         Past Surgical History:   Procedure Laterality Date    COLONOSCOPY  2005    COLONOSCOPY N/A 06/02/2022    Procedure:  COLONOSCOPY to terminal ileum with polypectomy (cold bx);  Surgeon: Inder Brand MD;  Location: [facility] ENDOSCOPY;  Service: Gastroenterology;  Laterality: N/A;  pre - family hx colon polyps  post - polyps, hemorrhoids    EXPLORATORY LAPAROTOMY N/A 2023    Procedure: EXPLORATORY LAPAROTOMY, TOTAL ABDOMINAL HYSTERECTOMY, BILATERAL SALPINGO OOPHORECTOMY WITH STAGING;  Surgeon: Abena Abdalla DO;  Location: [facility] MAIN OR;  Service: Gynecology Oncology;  Laterality: N/A;    HYSTERECTOMY      OOPHORECTOMY      UPPER GASTROINTESTINAL ENDOSCOPY           Social History     Socioeconomic History    Marital status: Single   Tobacco Use    Smoking status: Former     Packs/day: 2.00     Years: 26.00     Additional pack years: 0.00     Total pack years: 52.00     Types: Cigarettes     Quit date: 1997     Years since quittin.3    Smokeless tobacco: Never    Tobacco comments:     caffeine use- tea   Vaping Use    Vaping Use: Never used   Substance and Sexual Activity    Alcohol use: No    Drug use: No    Sexual activity: Not Currently     Partners: Male     Birth control/protection: Post-menopausal         Family History   Problem Relation Age of Onset    Heart disease Mother     Hypertension Mother     Depression Mother     Miscarriages / Stillbirths Mother     Cancer Father     Depression Sister     Cancer Sister     Depression Sister     Depression Brother     Colon polyps Brother     Atrial fibrillation Brother     Depression Brother     Depression Brother     Breast cancer Maternal Aunt     Breast cancer Maternal Aunt     Vision loss Maternal Grandmother     Arthritis Maternal Grandmother     Vision loss Paternal Grandmother     Diabetes Paternal Grandfather     Ovarian cancer Neg Hx     Malig Hyperthermia Neg Hx           Objective    Physical Exam  Constitutional:       Appearance: Normal appearance.   Genitourinary:     General: Normal vulva.      Vagina: No vaginal discharge.      Comments:  Pelvic exam reveals no suspicious lesions or masses, well healed vaginal cuff  Neurological:      Mental Status: She is alert.   Psychiatric:         Mood and Affect: Mood normal.         Current Outpatient Medications on File Prior to Visit   Medication Sig Dispense Refill    acetaminophen (TYLENOL) 500 MG tablet Take 1 tablet by mouth Every 6 (Six) Hours As Needed for Mild Pain.      apixaban (ELIQUIS) 5 MG tablet tablet Take 1 tablet by mouth 2 (Two) Times a Day. 180 tablet 3    Armodafinil 250 MG tablet Take 1 tablet by mouth Daily. PLANS TO HOLD FOR 48 HOURS PRIOR TO DOS      Calcium 500-100 MG-UNIT chewable tablet Chew 1 tablet Daily.      Cholecalciferol (VITAMIN D) 2000 units capsule Take 1 capsule by mouth Daily. HOLDING FOR DOS      clonazePAM (KlonoPIN) 0.5 MG tablet Take 1 tablet by mouth 2 (Two) Times a Day As Needed for Anxiety.      furosemide (Lasix) 20 MG tablet Take 1 tablet by mouth Daily for 14 days. 14 tablet 0    metoprolol succinate XL (TOPROL-XL) 100 MG 24 hr tablet TAKE ONE TABLET BY MOUTH DAILY 90 tablet 2    pramipexole (MIRAPEX) 0.25 MG tablet Take 1 tablet by mouth 2 (Two) Times a Day.      topiramate (TOPAMAX) 100 MG tablet Take 1 tablet by mouth Daily As Needed.       No current facility-administered medications on file prior to visit.       ALLERGIES:    Allergies   Allergen Reactions    Desyrel [Trazodone] Nausea And Vomiting    Modafinil Other (See Comments)     Those manufactured in [place].  Weight gain, cough       There were no vitals taken for this visit.         8/2/2023     2:19 PM   Current Status   ECOG score 1         Assessment & Plan     68 year old female with FIGO IA grade 3 endometrial adenocarcinoma now 9 months out from vaginal cuff brachtherapy and doing well.  In accordance with NCCN guidelines she will need a physical exam every 3-6 months for 2-3 years, then physical exam every 6-12 months for years 3-5.  she is scheduled to see Dr. Jade her gyn/onc in 3 months.    I will see her back in 6 months for follow up or sooner if necessary.     I personally spent greater than 35 minutes today assessing, managing, discussing and documenting my visit with the patient. That time includes review of records, imaging and pathology reports, obtaining my own history, performing a medically appropriate evaluation, counseling and educating the patient, discussing goals, logistics, alternatives and risks of my recommendations, surveillance options and potential outcomes. It also includes the time documenting the clinical information in the EMR and communicating my recommendations to the other involved physicians.              Thank you very much for allowing me to participate in the care of this very pleasant patient.    Sincerely,      Gabi Maxwell MD

## 2024-03-31 NOTE — OUTREACH NOTE
Call Center TCM Note    Flowsheet Row Responses   [facility] patient discharged from? Non-   Does the patient have one of the following disease processes/diagnoses(primary or secondary)? General Surgery   TCM attempt successful? Yes   Call start time 1355   Call end time 1355   Discharge diagnosis Encounter for surgical aftercare following surgery on the genitourinary system   Meds reviewed with patient/caregiver? Yes   Is the patient having any side effects they believe may be caused by any medication additions or changes? No   Does the patient have all medications ordered at discharge? Yes   Is the patient taking all medications as directed (includes completed medication regime)? Yes   Comments Patient presently on way to followup with GYN on 2/6/2023, declines scheduling PCP at this time.    Does the patient have an appointment with their PCP within 7 days of discharge? No   Nursing Interventions Patient desires to follow up with specialty only   Psychosocial issues? No   Did the patient receive a copy of their discharge instructions? Yes   Nursing interventions Reviewed instructions with patient   What is the patient's perception of their health status since discharge? Improving   Is the patient/caregiver able to teach back signs and symptoms related to disease process for when to call PCP? Yes   Is the patient/caregiver able to teach back signs and symptoms related to disease process for when to call 911? Yes   Is the patient/caregiver able to teach back the hierarchy of who to call/visit for symptoms/problems? PCP, Specialist, Home health nurse, Urgent Care, ED, 911 Yes   If the patient is a current smoker, are they able to teach back resources for cessation? Not a smoker   TCM call completed? Yes   Wrap up additional comments Quick call. patient presently on way to a followup with GYN/ONC   Call end time 1355   Would this patient benefit from a Referral to [facility] Social Work? No   Is the patient  interested in additional calls from an ambulatory ?  NOTE:  applies to high risk patients requiring additional follow-up. No          Rosalio Loera RN    2/6/2023, 13:57 EST       - S/p trach 3/8 by surgery by Dr. Joselo Mayorga   - Tracheostomy Sutures removed 3/13  - Patient weaned to tc ATC since 3/23  - FIO2 Decreased to 30 %  - Will maintain cuffed trach until cranioplasty   - Continue airway clearance; Duoneb q6h PRN

## 2024-04-01 ENCOUNTER — OFFICE VISIT (OUTPATIENT)
Dept: CARDIOLOGY | Facility: CLINIC | Age: 69
End: 2024-04-01
Payer: MEDICARE

## 2024-04-01 VITALS
HEART RATE: 89 BPM | BODY MASS INDEX: 46.01 KG/M2 | DIASTOLIC BLOOD PRESSURE: 82 MMHG | RESPIRATION RATE: 22 BRPM | HEIGHT: 62 IN | WEIGHT: 250 LBS | OXYGEN SATURATION: 98 % | SYSTOLIC BLOOD PRESSURE: 126 MMHG

## 2024-04-01 DIAGNOSIS — G47.33 OSA (OBSTRUCTIVE SLEEP APNEA): ICD-10-CM

## 2024-04-01 DIAGNOSIS — E66.01 MORBID OBESITY WITH BMI OF 45.0-49.9, ADULT: ICD-10-CM

## 2024-04-01 DIAGNOSIS — I48.20 CHRONIC ATRIAL FIBRILLATION: Primary | Chronic | ICD-10-CM

## 2024-04-01 DIAGNOSIS — E78.2 MIXED HYPERLIPIDEMIA: Chronic | ICD-10-CM

## 2024-04-01 PROCEDURE — 93000 ELECTROCARDIOGRAM COMPLETE: CPT | Performed by: INTERNAL MEDICINE

## 2024-04-01 PROCEDURE — 99214 OFFICE O/P EST MOD 30 MIN: CPT | Performed by: INTERNAL MEDICINE

## 2024-04-01 NOTE — PROGRESS NOTES
Subjective:     Encounter Date: 04/01/24        Patient ID: Bowen Concepcion is a 68 y.o. female.    Chief Complaint: PAF    Dear Dr. Pitts,    I had the pleasure seeing this patient in the office today for follow-up.  She comes in to follow-up on her cardiac status.    She comes in for follow-up of her chronic atrial fibrillation.  Overall been doing well without any particular cardiac complaints.  Never feels her heart rate.  Does not feel any palpitations.  Denies any chest pain or chest discomfort, no shortness of breath.  Main issues that with her arthritis in her knees has had difficulty walking and has gained weight.  No bleeding issues with the anticoagulation.    Walks with a cane for stability.    She initially presented with hyperthyroidism, acute.  She was also found to have atrial fibrillation.  She also at that time was hospitalized for cholestatic hepatitis with her total bilirubin over 17.  She was therefore initially not felt to be a candidate for anticoagulation nor antiarrhythmic therapy at the time, and rate control was utilized.        Past Medical History:   Diagnosis Date    Abnormal weight loss     Anxiety     Arthritis     OSTEOARTHRITIS RIGHT KNEE AND INDEX FINGERS    Bipolar disorder     Cancer 2023    UTERINE CANCER    Cataract 2015    developing    Chronic atrial fibrillation 12/09/2017    Chronic headaches     Depression     GERD (gastroesophageal reflux disease)     with spicy food    Hx of radiation therapy     Hyperkalemia     Hyperthyroidism 12/12/2017    Hypokalemia     Mild mitral regurgitation     Mild tricuspid regurgitation     Mixed hyperlipidemia     Morbid obesity with BMI of 40.0-44.9, adult 06/03/2019    Osteopenia osteopenia    Post-menopausal bleeding     Scoliosis     Sleep apnea     USES CPAP    Uterine mass     muliple masses, 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    Visual impairment cataracts developing       Past Surgical History:   Procedure Laterality Date    COLONOSCOPY  2005  "   COLONOSCOPY N/A 06/02/2022    Procedure: COLONOSCOPY to terminal ileum with polypectomy (cold bx);  Surgeon: Inder Brand MD;  Location: [facility] ENDOSCOPY;  Service: Gastroenterology;  Laterality: N/A;  pre - family hx colon polyps  post - polyps, hemorrhoids    EXPLORATORY LAPAROTOMY N/A 01/24/2023    Procedure: EXPLORATORY LAPAROTOMY, TOTAL ABDOMINAL HYSTERECTOMY, BILATERAL SALPINGO OOPHORECTOMY WITH STAGING;  Surgeon: Abena Abdalla DO;  Location: [facility] MAIN OR;  Service: Gynecology Oncology;  Laterality: N/A;    HYSTERECTOMY      OOPHORECTOMY      UPPER GASTROINTESTINAL ENDOSCOPY  2001             ECG 12 Lead    Date/Time: 4/1/2024 2:20 PM  Performed by: Victoriano Murdock III, MD    Authorized by: Victoriano Murdock III, MD  Comparison: compared with previous ECG   Similar to previous ECG  Rhythm: atrial fibrillation  Rate: normal  Conduction: conduction normal  ST Segments: ST segments normal  T Waves: T waves normal  QRS axis: normal  Other: no other findings    Clinical impression: abnormal EKG             Objective:     Vitals:    04/01/24 1342   BP: 126/82   BP Location: Left arm   Patient Position: Sitting   Cuff Size: Large Adult   Pulse: 89   Resp: 22   SpO2: 98%   Weight: 113 kg (250 lb)   Height: 157.5 cm (62\")     Body mass index is 45.73 kg/m².'        General Appearance:    Alert, cooperative, in no acute distress   Head:    Normocephalic, without obvious abnormality, atraumatic   Eyes:            Lids and lashes normal, conjunctivae and sclerae normal, no   icterus, no pallor, corneas clear, PERRLA   Ears:    Ears appear intact with no abnormalities noted   Throat:   No oral lesions, no thrush, oral mucosa moist   Neck:   No adenopathy, supple, trachea midline, no thyromegaly, no   carotid bruit, no JVD   Back:     No kyphosis present, no scoliosis present, no skin lesions, erythema or scars, no tenderness to percussion or palpation, range of motion normal   Lungs:     Clear to " auscultation,respirations regular, even and unlabored    Heart:    irregular rhythm and normal rate, normal S1 and S2, no murmur, no gallop, no rub, no click   Chest Wall:    No abnormalities observed   Abdomen:     Normal bowel sounds, no masses, no organomegaly, soft        non-tender, non-distended, no guarding, no rebound  tenderness   Extremities:   Moves all extremities well, no edema, no cyanosis, no redness   Pulses:   Pulses palpable and equal bilaterally. Normal radial, carotid, femoral, dorsalis pedis and posterior tibial pulses bilaterally. Normal abdominal aorta   Skin:  Psychiatric:   No bleeding, bruising or rash    Alert and oriented x 3, normal mood and affect     CHADS-VASc Risk Assessment              2 Total Score    1 Age 65-74    1 Sex: Female        Criteria that do not apply:    CHF    Hypertension    Age >/= 75    DM    PRIOR STROKE/TIA/THROMBO    Vascular Disease              Lab Review:             Lab Results   Component Value Date    GLUCOSE 98 02/22/2023    BUN 17 02/22/2023    CREATININE 0.60 09/19/2023    EGFRIFNONA 82 10/08/2021    EGFRIFAFRI 91 02/28/2019    BCR 26.2 (H) 02/22/2023    K 4.3 02/22/2023    CO2 29.5 (H) 02/22/2023    CALCIUM 9.2 02/22/2023    PROTENTOTREF 6.3 02/22/2023    ALBUMIN 4.1 02/22/2023    LABIL2 1.9 02/22/2023    AST 13 02/22/2023    ALT 7 02/22/2023             Assessment:          Diagnosis Plan   1. Chronic atrial fibrillation        2. Mixed hyperlipidemia        3. BARBIE (obstructive sleep apnea)        4. Morbid obesity with BMI of 45.0-49.9, adult                 Plan:       1.  A-fib, rate control, continue rate control and chronic anticoagulation  2.  Mixed hyperlipidemia-continue lipid-lowering therapy with atorvastatin, AST ALT reviewed  3.  Cholestatic hepatitis, resolved  4.  Morbid obesity-conts to work on weight loss.  She unfortunate is actually gained weight over the last year, is looking to join [employer] and work with one of the trainers to  find some activity that she can successfully do given her underlying obesity and arthritis of her knees.  5.  Uterine cancer-evaluation and treatment underway  6.  Lower extremity edema-had significant edema initially after the surgery for her uterine cancer but that resolved.    Thank you very much for allowing us to participate in the care of this pleasant patient.  Please don't hesitate to call if I can be of assistance in any way.      Current Outpatient Medications:     acetaminophen (TYLENOL) 500 MG tablet, Take 1 tablet by mouth Every 6 (Six) Hours As Needed for Mild Pain., Disp: , Rfl:     apixaban (ELIQUIS) 5 MG tablet tablet, Take 1 tablet by mouth 2 (Two) Times a Day., Disp: 180 tablet, Rfl: 3    Armodafinil 250 MG tablet, Take 1 tablet by mouth Daily. PLANS TO HOLD FOR 48 HOURS PRIOR TO DOS, Disp: , Rfl:     Calcium 500-100 MG-UNIT chewable tablet, Chew 1 tablet Daily., Disp: , Rfl:     Cholecalciferol (VITAMIN D) 2000 units capsule, Take 1 capsule by mouth Daily. HOLDING FOR DOS, Disp: , Rfl:     clonazePAM (KlonoPIN) 0.5 MG tablet, Take 1 tablet by mouth 2 (Two) Times a Day As Needed for Anxiety., Disp: , Rfl:     metoprolol succinate XL (TOPROL-XL) 100 MG 24 hr tablet, TAKE ONE TABLET BY MOUTH DAILY, Disp: 90 tablet, Rfl: 2    pramipexole (MIRAPEX) 0.25 MG tablet, Take 1 tablet by mouth 2 (Two) Times a Day., Disp: , Rfl:     topiramate (TOPAMAX) 100 MG tablet, Take 1 tablet by mouth Daily As Needed., Disp: , Rfl:     furosemide (Lasix) 20 MG tablet, Take 1 tablet by mouth Daily for 14 days., Disp: 14 tablet, Rfl: 0       Return in about 1 year (around 4/1/2025).

## 2024-04-30 ENCOUNTER — TRANSCRIBE ORDERS (OUTPATIENT)
Dept: ADMINISTRATIVE | Facility: HOSPITAL | Age: 69
End: 2024-04-30
Payer: MEDICARE

## 2024-04-30 DIAGNOSIS — C54.1 ENDOMETRIAL SARCOMA: Primary | ICD-10-CM

## 2024-05-13 DIAGNOSIS — I48.20 CHRONIC ATRIAL FIBRILLATION: Chronic | ICD-10-CM

## 2024-05-13 RX ORDER — METOPROLOL SUCCINATE 100 MG/1
100 TABLET, EXTENDED RELEASE ORAL DAILY
Qty: 90 TABLET | Refills: 2 | Status: SHIPPED | OUTPATIENT
Start: 2024-05-13

## 2024-05-31 ENCOUNTER — HOSPITAL ENCOUNTER (OUTPATIENT)
Dept: CT IMAGING | Facility: HOSPITAL | Age: 69
Discharge: HOME | End: 2024-05-31
Payer: MEDICARE

## 2024-05-31 DIAGNOSIS — C54.1 ENDOMETRIAL SARCOMA: ICD-10-CM

## 2024-05-31 LAB — CREAT BLDA-MCNC: 0.8 MG/DL (ref 0.6–1.3)

## 2024-05-31 PROCEDURE — 74177 CT ABD & PELVIS W/CONTRAST: CPT

## 2024-05-31 PROCEDURE — 71260 CT THORAX DX C+: CPT

## 2024-05-31 PROCEDURE — 25510000001 IOPAMIDOL 61 % SOLUTION: Performed by: OBSTETRICS & GYNECOLOGY

## 2024-05-31 PROCEDURE — 82565 ASSAY OF CREATININE: CPT

## 2024-05-31 RX ADMIN — IOPAMIDOL 90 ML: 612 INJECTION, SOLUTION INTRAVENOUS at 11:00

## 2024-07-09 DIAGNOSIS — I48.20 CHRONIC ATRIAL FIBRILLATION: ICD-10-CM

## 2024-07-10 RX ORDER — APIXABAN 5 MG/1
5 TABLET, FILM COATED ORAL 2 TIMES DAILY
Qty: 180 TABLET | Refills: 0 | Status: SHIPPED | OUTPATIENT
Start: 2024-07-10

## 2024-07-10 NOTE — TELEPHONE ENCOUNTER
Refill request for Eliquis 5mg BID.    LOV w/ Dr. Murdock 4/1/24  FU w/ Dr. Murdock 4/7/25  Last Labs 2/22/23    Please review and sign.    TESS Hwang

## 2024-09-10 ENCOUNTER — TRANSCRIBE ORDERS (OUTPATIENT)
Dept: ADMINISTRATIVE | Facility: HOSPITAL | Age: 69
End: 2024-09-10
Payer: MEDICARE

## 2024-09-10 DIAGNOSIS — Z12.31 BREAST CANCER SCREENING BY MAMMOGRAM: Primary | ICD-10-CM

## 2024-10-23 ENCOUNTER — HOSPITAL ENCOUNTER (OUTPATIENT)
Dept: MAMMOGRAPHY | Facility: HOSPITAL | Age: 69
Discharge: HOME | End: 2024-10-23
Admitting: FAMILY MEDICINE
Payer: MEDICARE

## 2024-10-23 DIAGNOSIS — Z12.31 BREAST CANCER SCREENING BY MAMMOGRAM: ICD-10-CM

## 2024-10-23 PROCEDURE — 77063 BREAST TOMOSYNTHESIS BI: CPT

## 2024-10-23 PROCEDURE — 77067 SCR MAMMO BI INCL CAD: CPT

## 2024-10-27 NOTE — PROGRESS NOTES
IMPRESSION:  Benign mammogram unchanged from 09/19/2023 and 08/01/2022.     BI-RADS Category 2:  Benign findings.

## 2024-11-26 DIAGNOSIS — I48.20 CHRONIC ATRIAL FIBRILLATION: ICD-10-CM

## 2025-01-27 NOTE — PROGRESS NOTES
Subjective:     Encounter Date: 6/3/2019      Patient ID: Bowen Concepcion is a 63 y.o. female.    Chief Complaint: PAF  History of Present Illness    Dear Dr. Pitts,    I had the pleasure of seeing this patient in the office today for follow-up. She comes in for follow-up of her chronic atrial fibrillation.  She initially presented with hyperthyroidism, acute.  She was also found to have atrial fibrillation.  She also at that time was hospitalized for cholestatic hepatitis with her total bilirubin over 17.  She was not felt to be a candidate for anticoagulation nor antiarrhythmic therapy at the time, and rate control was utilized.  She has a CHADS Vascular score of 1, and has been maintained on aspirin since her liver function recovered.    She comes in for a one-year follow-up.  She has been doing fine without any complaints.  She never feels the atrial fibrillation.  She denies any chest pain, pressure, tightness, squeezing, or heartburn.  She has not experienced any feeling of palpitations, tachycardia or heart racing and no presyncope or syncope.  There have not been any problems with dizziness or lightheadedness.  There have not been any orthopnea or PND, and no problems with lower extremity edema.  She denies any shortness of breath at rest or with activity and has not had any wheezing.  She has not had any problems with unexplained nausea or vomiting. She has continued to perform daily activities of living without any specific problem or change in the level of activity.  She has not been recently hospitalized for any reason.        Past Medical History:   Diagnosis Date   • Abnormal weight loss    • Anxiety    • Arthritis     OSTEOARTHRITIS RIGHT KNEE AND INDEX FINGERS   • Atrial fibrillation (CMS/HCC)    • Atrial fibrillation with RVR (CMS/HCC)    • Bipolar disorder (CMS/HCC)    • Cholelithiasis     uncompllicated   • Depression    • Hyperkalemia    • Hyperthyroidism 12/12/2017   • Hypokalemia    •  US tech called and reports 1.5L of fluid taken off of Pt during thoracentesis procedure.    Jaundice    • Mild mitral regurgitation    • Mild tricuspid regurgitation    • Mixed hyperlipidemia    • Nausea and vomiting    • BARBIE (obstructive sleep apnea)    • Sleep apnea    • Uterine mass     muliple masses, 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       Past Surgical History:   Procedure Laterality Date   • COLONOSCOPY  2005   • UPPER GASTROINTESTINAL ENDOSCOPY  2001       Social History     Socioeconomic History   • Marital status: Single     Spouse name: Not on file   • Number of children: Not on file   • Years of education: Not on file   • Highest education level: Not on file   Tobacco Use   • Smoking status: Former Smoker     Packs/day: 2.00     Years: 26.00     Pack years: 52.00   • Smokeless tobacco: Never Used   Substance and Sexual Activity   • Alcohol use: No   • Drug use: No   • Sexual activity: No       Review of Systems   Constitution: Negative for chills, decreased appetite, fever and night sweats.   HENT: Negative for ear discharge, ear pain, hearing loss, nosebleeds and sore throat.    Eyes: Negative for blurred vision, double vision and pain.   Cardiovascular: Negative for cyanosis.   Respiratory: Negative for hemoptysis and sputum production.    Endocrine: Negative for cold intolerance and heat intolerance.   Hematologic/Lymphatic: Negative for adenopathy.   Skin: Negative for dry skin, itching, nail changes, rash and suspicious lesions.   Musculoskeletal: Negative for arthritis, gout, muscle cramps, muscle weakness, myalgias and neck pain.   Gastrointestinal: Negative for anorexia, bowel incontinence, constipation, diarrhea, dysphagia, hematemesis and jaundice.   Genitourinary: Negative for bladder incontinence, dysuria, flank pain, frequency, hematuria and nocturia.   Neurological: Negative for focal weakness, numbness, paresthesias and seizures.   Psychiatric/Behavioral: Negative for altered mental status, hallucinations, hypervigilance, suicidal ideas and thoughts of violence. The patient is nervous/anxious.   "  Allergic/Immunologic: Negative for persistent infections.         ECG 12 Lead  Date/Time: 6/3/2019 1:55 PM  Performed by: Victoriano Murdock III, MD  Authorized by: Victoriano Murdock III, MD   Comparison: compared with previous ECG   Similar to previous ECG  Rhythm: atrial fibrillation  Rate: normal  Conduction: conduction normal  ST Segments: ST segments normal  T Waves: T waves normal  QRS axis: normal  Other: no other findings    Clinical impression: abnormal EKG               Objective:     Vitals:    06/03/19 1306   BP: 124/80   Pulse: 89   Weight: 110 kg (243 lb)   Height: 157.5 cm (62\")         Physical Exam   Constitutional: She is oriented to person, place, and time. She appears well-developed and well-nourished. No distress.   HENT:   Head: Normocephalic and atraumatic.   Nose: Nose normal.   Mouth/Throat: Oropharynx is clear and moist.   Eyes: Conjunctivae and EOM are normal. Pupils are equal, round, and reactive to light. Right eye exhibits no discharge. Left eye exhibits no discharge.   Neck: Normal range of motion. Neck supple. No tracheal deviation present. No thyromegaly present.   Cardiovascular: Normal rate, regular rhythm, S1 normal, S2 normal, normal heart sounds and normal pulses. Exam reveals no S3.   Pulmonary/Chest: Effort normal and breath sounds normal. No stridor. No respiratory distress. She exhibits no tenderness.   Abdominal: Soft. Bowel sounds are normal. She exhibits no distension and no mass. There is no tenderness. There is no rebound and no guarding.   Musculoskeletal: Normal range of motion. She exhibits no tenderness or deformity.   Lymphadenopathy:     She has no cervical adenopathy.   Neurological: She is alert and oriented to person, place, and time. She has normal reflexes.   Skin: Skin is warm and dry. No rash noted. She is not diaphoretic. No erythema.   Psychiatric: She has a normal mood and affect. Thought content normal.       Lab Review:             Performed      "   Assessment:          Diagnosis Plan   1. Chronic atrial fibrillation (CMS/HCC)  metoprolol succinate XL (TOPROL-XL) 50 MG 24 hr tablet    ECG 12 Lead   2. Morbidly obese (CMS/HCC)     3. Morbid obesity with BMI of 40.0-44.9, adult (CMS/[facility])            Plan:       1. Atrial Fibrillation and Atrial Flutter  Assessment  • The patient has paroxysmal atrial fibrillation  • This is non-valvular in etiology  • The transient or reversible cause of the patient's arrhythmia is hyperthyroidism  • The patient's CHADS2-VASc score is 1  • A [id number] score of 1 is considered an intermediate risk for a thromboembolic event  • Aspirin prescribed    Plan  • Continue aspirin for antithrombotic therapy, bleeding issues discussed  • Continue beta blocker for rhythm control    2.  Mixed hyperlipidemia-  3.  Cholestatic hepatitis, resolved  4.  Morbid obesity-we discussed weight loss; she currently is caring for a very ill cat at home and has limited opportunity for activity but hopes to initiate that soon  Thank you very much for allowing us to participate in the care of this pleasant patient.  Please don't hesitate to call if I can be of assistance in any way.      Current Outpatient Medications:   •  Acetaminophen (TYLENOL EXTRA STRENGTH PO), Take 1 tablet by mouth As Needed., Disp: , Rfl:   •  ALPRAZolam XR (XANAX XR) 0.5 MG 24 hr tablet, Take 0.5 mg by mouth 2 (Two) Times a Day As Needed for Anxiety., Disp: , Rfl:   •  Armodafinil (NUVIGIL) 250 MG tablet, Take 250 mg by mouth Daily., Disp: , Rfl:   •  aspirin 81 MG tablet, Take 1 tablet by mouth Daily., Disp: , Rfl:   •  atorvastatin (LIPITOR) 20 MG tablet, Take 1 tablet by mouth Every Night., Disp: 90 tablet, Rfl: 1  •  Brexpiprazole (REXULTI) 0.5 MG tablet, Take 1 tablet by mouth 3 (Three) Times a Week., Disp: , Rfl:   •  Calcium 500-100 MG-UNIT chewable tablet, Chew 1 tablet Every 4 (Four) Hours As Needed., Disp: , Rfl:   •  Cholecalciferol (VITAMIN D) 2000 units capsule,  Take 2,000 Units by mouth Daily., Disp: , Rfl:   •  clonazePAM (KlonoPIN) 0.5 MG tablet, Take 0.5 mg by mouth 2 (Two) Times a Day As Needed for Anxiety., Disp: , Rfl:   •  Ibuprofen (ADVIL PO), Take 1 tablet by mouth As Needed., Disp: , Rfl:   •  metoprolol succinate XL (TOPROL-XL) 50 MG 24 hr tablet, Take 1 tablet by mouth Daily., Disp: 90 tablet, Rfl: 3  •  Multiple Vitamins-Minerals (PRESERVISION AREDS 2 PO), Take  by mouth., Disp: , Rfl:   •  naproxen (NAPROSYN) 250 MG tablet, Take 500 mg by mouth Daily As Needed., Disp: , Rfl:   •  topiramate (TOPAMAX) 100 MG tablet, Take 100 mg by mouth Daily., Disp: , Rfl:   •  umeclidinium-vilanterol (ANORO ELLIPTA) 62.5-25 MCG/INH aerosol powder  inhaler, Inhale 1 puff Daily., Disp: 1 each, Rfl: 6  •  zolpidem CR (AMBIEN CR) 12.5 MG CR tablet, Take 12.5 mg by mouth At Night As Needed for Sleep., Disp: , Rfl:          EMR Dragon/Transcription disclaimer:    Much of this encounter note is an electronic transcription/translation of spoken language to printed text. The electronic translation of spoken language may permit erroneous, or at times, nonsensical words or phrases to be inadvertently transcribed; Although I have reviewed the note for such errors, some may still exist.

## 2025-02-17 DIAGNOSIS — I48.20 CHRONIC ATRIAL FIBRILLATION: Chronic | ICD-10-CM

## 2025-02-17 RX ORDER — METOPROLOL SUCCINATE 100 MG/1
100 TABLET, EXTENDED RELEASE ORAL DAILY
Qty: 90 TABLET | Refills: 2 | Status: SHIPPED | OUTPATIENT
Start: 2025-02-17

## 2025-03-31 ENCOUNTER — OFFICE VISIT (OUTPATIENT)
Dept: CARDIOLOGY | Facility: CLINIC | Age: 70
End: 2025-03-31
Payer: MEDICARE

## 2025-03-31 VITALS
SYSTOLIC BLOOD PRESSURE: 120 MMHG | HEIGHT: 62 IN | RESPIRATION RATE: 20 BRPM | BODY MASS INDEX: 49.5 KG/M2 | DIASTOLIC BLOOD PRESSURE: 76 MMHG | WEIGHT: 269 LBS | OXYGEN SATURATION: 97 % | HEART RATE: 86 BPM

## 2025-03-31 DIAGNOSIS — I48.20 CHRONIC ATRIAL FIBRILLATION: Primary | Chronic | ICD-10-CM

## 2025-03-31 DIAGNOSIS — G47.33 OSA (OBSTRUCTIVE SLEEP APNEA): ICD-10-CM

## 2025-03-31 DIAGNOSIS — E66.01 MORBID OBESITY WITH BMI OF 45.0-49.9, ADULT: ICD-10-CM

## 2025-03-31 DIAGNOSIS — E78.2 MIXED HYPERLIPIDEMIA: Chronic | ICD-10-CM

## 2025-03-31 PROCEDURE — 93000 ELECTROCARDIOGRAM COMPLETE: CPT | Performed by: INTERNAL MEDICINE

## 2025-03-31 PROCEDURE — 99214 OFFICE O/P EST MOD 30 MIN: CPT | Performed by: INTERNAL MEDICINE

## 2025-03-31 PROCEDURE — 1159F MED LIST DOCD IN RCRD: CPT | Performed by: INTERNAL MEDICINE

## 2025-03-31 PROCEDURE — 1160F RVW MEDS BY RX/DR IN RCRD: CPT | Performed by: INTERNAL MEDICINE

## 2025-03-31 RX ORDER — UBIDECARENONE 75 MG
100 CAPSULE ORAL DAILY
COMMUNITY

## 2025-03-31 NOTE — PROGRESS NOTES
Subjective:     Encounter Date: 03/31/25        Patient ID: Bowen Concepcion is a 69 y.o. female.    Chief Complaint: PAF    Dear Dr. Pitts,    I had the pleasure seeing this patient in the office today for follow-up.  She comes in to follow-up on her cardiac status.    She comes in for follow-up of her chronic atrial fibrillation.  She denies any chest pain, pressure, tightness, squeezing, or heartburn.  She has not experienced any feeling of palpitations, tachycardia or heart racing and no presyncope or syncope.  There has not been any problems with dizziness or lightheadedness.  There has not been any orthopnea or PND, and no problems with lower extremity edema.  She denies any shortness of breath at rest or with activity and has not had any wheezing.  She has continued to perform daily activities of living without any specific problem or change in the level of activity.    Walks with a cane or walker for stability.    She initially presented with hyperthyroidism, acute.  She was also found to have atrial fibrillation.  She also at that time was hospitalized for cholestatic hepatitis with her total bilirubin over 17.  She was therefore initially not felt to be a candidate for anticoagulation nor antiarrhythmic therapy at the time, and rate control was utilized.        Past Medical History:   Diagnosis Date    Abnormal weight loss     Anxiety     Arthritis     OSTEOARTHRITIS RIGHT KNEE AND INDEX FINGERS    Bipolar disorder     Cancer 2023    UTERINE CANCER    Cataract 2015    developing    Chronic atrial fibrillation 12/09/2017    Chronic headaches     Depression     GERD (gastroesophageal reflux disease)     with spicy food    Hx of radiation therapy     Hyperkalemia     Hyperthyroidism 12/12/2017    Hypokalemia     Mild mitral regurgitation     Mild tricuspid regurgitation     Mixed hyperlipidemia     Morbid obesity with BMI of 40.0-44.9, adult 06/03/2019    Osteopenia osteopenia    Post-menopausal  "bleeding     Scoliosis     Sleep apnea     USES CPAP    Uterine mass     muliple masses, 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    Visual impairment cataracts developing       Past Surgical History:   Procedure Laterality Date    COLONOSCOPY  2005    COLONOSCOPY N/A 06/02/2022    Procedure: COLONOSCOPY to terminal ileum with polypectomy (cold bx);  Surgeon: Inder Brand MD;  Location: [facility] ENDOSCOPY;  Service: Gastroenterology;  Laterality: N/A;  pre - family hx colon polyps  post - polyps, hemorrhoids    EXPLORATORY LAPAROTOMY N/A 01/24/2023    Procedure: EXPLORATORY LAPAROTOMY, TOTAL ABDOMINAL HYSTERECTOMY, BILATERAL SALPINGO OOPHORECTOMY WITH STAGING;  Surgeon: Abena Abdalla DO;  Location: [facility] MAIN OR;  Service: Gynecology Oncology;  Laterality: N/A;    HYSTERECTOMY      OOPHORECTOMY      UPPER GASTROINTESTINAL ENDOSCOPY  2001             ECG 12 Lead    Date/Time: 3/31/2025 3:33 PM  Performed by: Victoriano Murdock III, MD    Authorized by: Victoriano Murdock III, MD  Comparison: compared with previous ECG   Similar to previous ECG  Rhythm: atrial fibrillation  Rate: normal  Conduction: conduction normal  ST Segments: ST segments normal  T Waves: T waves normal  QRS axis: normal  Other: no other findings    Clinical impression: abnormal EKG             Objective:     Vitals:    03/31/25 1410 03/31/25 1428   BP: 120/76    Pulse: 107 86   Resp: 20    SpO2: 97%    Weight: 122 kg (269 lb)    Height: 157.5 cm (62\")      Body mass index is 49.2 kg/m².'        General Appearance:    Alert, cooperative, in no acute distress   Head:    Normocephalic, without obvious abnormality, atraumatic   Eyes:            Lids and lashes normal, conjunctivae and sclerae normal, no   icterus, no pallor, corneas clear, PERRLA   Ears:    Ears appear intact with no abnormalities noted   Throat:   No oral lesions, no thrush, oral mucosa moist   Neck:   No adenopathy, supple, trachea midline, no thyromegaly, no   carotid bruit, no JVD   Back:     No kyphosis " present, no scoliosis present, no skin lesions, erythema or scars, no tenderness to percussion or palpation, range of motion normal   Lungs:     Clear to auscultation,respirations regular, even and unlabored    Heart:    irregular rhythm and normal rate, normal S1 and S2, no murmur, no gallop, no rub, no click   Chest Wall:    No abnormalities observed   Abdomen:     Normal bowel sounds, no masses, no organomegaly, soft        non-tender, non-distended, no guarding, no rebound  tenderness   Extremities:   Moves all extremities well, no edema, no cyanosis, no redness   Pulses:   Pulses palpable and equal bilaterally. Normal radial, carotid, femoral, dorsalis pedis and posterior tibial pulses bilaterally. Normal abdominal aorta   Skin:  Psychiatric:   No bleeding, bruising or rash    Alert and oriented x 3, normal mood and affect     CHADS-VASc Risk Assessment              2 Total Score    1 Age 65-74    1 Sex: Female        Criteria that do not apply:    CHF    Hypertension    Age >/= 75    DM    PRIOR STROKE/TIA/THROMBO    Vascular Disease              Lab Review:             Lab Results   Component Value Date    GLUCOSE 98 02/22/2023    BUN 17 02/22/2023    CREATININE 0.80 05/31/2024    EGFRIFNONA 82 10/08/2021    EGFRIFAFRI 91 02/28/2019    BCR 26.2 (H) 02/22/2023    K 4.3 02/22/2023    CO2 29.5 (H) 02/22/2023    CALCIUM 9.2 02/22/2023    ALBUMIN 4.1 02/22/2023    AST 13 02/22/2023    ALT 7 02/22/2023     Results for orders placed during the hospital encounter of 04/06/23    Adult Transthoracic Echo Complete W/ Cont if Necessary Per Protocol    Interpretation Summary    Left ventricular systolic function is normal. Calculated left ventricular EF = 62%    Left ventricular diastolic function was indeterminate.    The left atrial cavity is mildly dilated.    Mild mitral valve regurgitation is present.    Estimated right ventricular systolic pressure from tricuspid regurgitation is normal (<35 mmHg).             Assessment:          Diagnosis Plan   1. Chronic atrial fibrillation  apixaban (Eliquis) 5 MG tablet tablet      2. Mixed hyperlipidemia        3. Morbid obesity with BMI of 45.0-49.9, adult        4. BARBIE (obstructive sleep apnea)                 Plan:       1.  A-fib, rate control, continue rate control and chronic anticoagulation  2.  Mixed hyperlipidemia-continue lipid-lowering therapy with atorvastatin, AST ALT reviewed  3.  Cholestatic hepatitis, resolved  4.  Morbid obesity-conts to work on weight loss.   5.  Uterine cancer-evaluation and treatment underway  6.  Lower extremity edema-had significant edema initially after the surgery for her uterine cancer but that resolved.    Thank you very much for allowing us to participate in the care of this pleasant patient.  Please don't hesitate to call if I can be of assistance in any way.      Current Outpatient Medications:     acetaminophen (TYLENOL) 500 MG tablet, Take 1 tablet by mouth Every 6 (Six) Hours As Needed for Mild Pain., Disp: , Rfl:     apixaban (Eliquis) 5 MG tablet tablet, Take 1 tablet by mouth 2 (Two) Times a Day., Disp: 180 tablet, Rfl: 3    Armodafinil 250 MG tablet, Take 1 tablet by mouth Daily. PLANS TO HOLD FOR 48 HOURS PRIOR TO DOS, Disp: , Rfl:     Calcium 500-100 MG-UNIT chewable tablet, Chew 1 tablet Daily., Disp: , Rfl:     Cholecalciferol (VITAMIN D) 2000 units capsule, Take 1 capsule by mouth Daily. HOLDING FOR DOS, Disp: , Rfl:     clonazePAM (KlonoPIN) 0.5 MG tablet, Take 1 tablet by mouth 2 (Two) Times a Day As Needed for Anxiety., Disp: , Rfl:     metoprolol succinate XL (TOPROL-XL) 100 MG 24 hr tablet, TAKE 1 TABLET BY MOUTH DAILY, Disp: 90 tablet, Rfl: 2    pramipexole (MIRAPEX) 0.25 MG tablet, Take 1 tablet by mouth 2 (Two) Times a Day., Disp: , Rfl:     vitamin B-12 (cyanocobalamin) 100 MCG tablet, Take 1 tablet by mouth Daily., Disp: , Rfl:        Return in about 1 year (around 3/31/2026).

## 2025-06-20 ENCOUNTER — TRANSCRIBE ORDERS (OUTPATIENT)
Dept: ADMINISTRATIVE | Facility: HOSPITAL | Age: 70
End: 2025-06-20
Payer: MEDICARE

## 2025-06-20 ENCOUNTER — LAB (OUTPATIENT)
Dept: LAB | Facility: HOSPITAL | Age: 70
End: 2025-06-20
Payer: MEDICARE

## 2025-06-20 DIAGNOSIS — C56.1 MALIGNANT NEOPLASM OF RIGHT OVARY: ICD-10-CM

## 2025-06-20 DIAGNOSIS — C54.1 ADENOCARCINOMA OF THE ENDOMETRIUM/UTERUS: ICD-10-CM

## 2025-06-20 DIAGNOSIS — C54.1 ADENOCARCINOMA OF THE ENDOMETRIUM/UTERUS: Primary | ICD-10-CM

## 2025-06-20 LAB — CANCER AG125 SERPL QL: 9.5 U/ML (ref 0–38.1)

## 2025-06-20 PROCEDURE — 86304 IMMUNOASSAY TUMOR CA 125: CPT

## 2025-06-20 PROCEDURE — 36415 COLL VENOUS BLD VENIPUNCTURE: CPT

## 2025-07-03 ENCOUNTER — TELEPHONE (OUTPATIENT)
Age: 70
End: 2025-07-03
Payer: MEDICARE

## 2025-07-03 NOTE — TELEPHONE ENCOUNTER
Cardiac clearance has been received from [facility]. They are requesting for clearance for mutual patient to have cataract surgery done on 07/07/2025.    Return clearance to [phone]

## 2025-07-03 NOTE — TELEPHONE ENCOUNTER
Cardiac clearance has been faxed and sent over to [facility] at fax number [phone]. Fax was sent with success. JUL/03/2025 3:30:10 PM

## (undated) DEVICE — ELECTRD BLD EZ CLN STD 6.5IN

## (undated) DEVICE — CANN O2 ETCO2 FITS ALL CONN CO2 SMPL A/ 7IN DISP LF

## (undated) DEVICE — ANTIBACTERIAL UNDYED BRAIDED (POLYGLACTIN 910), SYNTHETIC ABSORBABLE SUTURE: Brand: COATED VICRYL

## (undated) DEVICE — ADAPT CLN BIOGUARD AIR/H2O DISP

## (undated) DEVICE — PATIENT RETURN ELECTRODE, SINGLE-USE, CONTACT QUALITY MONITORING, ADULT, WITH 9FT CORD, FOR PATIENTS WEIGING OVER 33LBS. (15KG): Brand: MEGADYNE

## (undated) DEVICE — DRP SLUSH WARMR MACH CIR 44X44IN

## (undated) DEVICE — PENCL ES MEGADINE EZ/CLEAN BUTN W/HOLSTR 10FT

## (undated) DEVICE — DEV OPN LIGASURE CRV 180D 36MM 13.5CM  1P/U

## (undated) DEVICE — SUT VIC 2/0 TIES 54IN J607H

## (undated) DEVICE — APPL CHLORAPREP HI/LITE 26ML ORNG

## (undated) DEVICE — DRAPE,CHEST,FENES,15X10,STERIL: Brand: MEDLINE

## (undated) DEVICE — SUT MNCRYL PLS ANTIB UD 4/0 PS2 18IN

## (undated) DEVICE — LN SMPL CO2 SHTRM SD STREAM W/M LUER

## (undated) DEVICE — STPLR SKIN VISISTAT WD 35CT

## (undated) DEVICE — SUT VIC 0 CT1 27IN DYED J340H

## (undated) DEVICE — GLV SURG SENSICARE PI PF LF 7 GRN STRL

## (undated) DEVICE — SUT VIC 0 CT1 CR8 27IN JJ41G

## (undated) DEVICE — GLV SURG BIOGEL LTX PF 5 1/2

## (undated) DEVICE — SENSR O2 OXIMAX FNGR A/ 18IN NONSTR

## (undated) DEVICE — SINGLE-USE BIOPSY FORCEPS: Brand: RADIAL JAW 4

## (undated) DEVICE — GOWN,SIRUS,NON REINFRCD,LARGE,SET IN SL: Brand: MEDLINE

## (undated) DEVICE — DRSNG WND BORDR/ADHS NONADHR/GZ LF 4X14IN STRL

## (undated) DEVICE — SUT PDS 1 XLH LP 99IN Z881G

## (undated) DEVICE — TUBING, SUCTION, 1/4" X 20', STRAIGHT: Brand: MEDLINE INDUSTRIES, INC.

## (undated) DEVICE — LEGGINGS, PAIR, CLEAR, STERILE: Brand: MEDLINE

## (undated) DEVICE — SEALANT WND FIBRIN TISSEEL VAPOR/HEAT/PREFIL/SYR 10ML

## (undated) DEVICE — TOWEL,OR,DSP,ST,BLUE,STD,4/PK,20PK/CS: Brand: MEDLINE

## (undated) DEVICE — GLV SURG BIOGEL LTX PF 6 1/2

## (undated) DEVICE — DRAPE,UNDERBUTTOCKS,PCH,STERILE: Brand: MEDLINE

## (undated) DEVICE — KT ORCA ORCAPOD DISP STRL

## (undated) DEVICE — PK HYST ABD 40

## (undated) DEVICE — TUBING, SUCTION, 1/4" X 10', STRAIGHT: Brand: MEDLINE